# Patient Record
Sex: FEMALE | Race: WHITE | NOT HISPANIC OR LATINO | Employment: FULL TIME | URBAN - NONMETROPOLITAN AREA
[De-identification: names, ages, dates, MRNs, and addresses within clinical notes are randomized per-mention and may not be internally consistent; named-entity substitution may affect disease eponyms.]

---

## 2019-05-01 ENCOUNTER — HOSPITAL ENCOUNTER (OUTPATIENT)
Dept: FAMILY MEDICINE CLINIC | Facility: CLINIC | Age: 49
Setting detail: SPECIMEN
Discharge: HOME | End: 2019-05-01
Attending: FAMILY MEDICINE | Admitting: FAMILY MEDICINE

## 2019-05-03 ENCOUNTER — HOSPITAL ENCOUNTER (OUTPATIENT)
Dept: FAMILY MEDICINE CLINIC | Facility: CLINIC | Age: 49
Setting detail: SPECIMEN
Discharge: HOME | End: 2019-05-03
Attending: FAMILY MEDICINE | Admitting: FAMILY MEDICINE

## 2019-06-13 ENCOUNTER — HOSPITAL ENCOUNTER (OUTPATIENT)
Dept: FAMILY MEDICINE CLINIC | Facility: CLINIC | Age: 49
Setting detail: SPECIMEN
Discharge: HOME | End: 2019-06-13
Attending: FAMILY MEDICINE | Admitting: FAMILY MEDICINE

## 2019-06-13 LAB
AMPICILLIN SUSC ISLT: ABNORMAL
AZTREONAM SUSC ISLT: ABNORMAL
BACTERIA ISLT: ABNORMAL
BACTERIA SPEC AEROBE CULT: ABNORMAL
CEFAZOLIN SUSC ISLT: ABNORMAL
CEFEPIME SUSC ISLT: ABNORMAL
CEFTAZIDIME SUSC ISLT: ABNORMAL
CEFTRIAXONE SUSC ISLT: ABNORMAL
CIPROFLOXACIN SUSC ISLT: ABNORMAL
COLONY COUNT: ABNORMAL
LEVOFLOXACIN SUSC ISLT: ABNORMAL
Lab: ABNORMAL
MEROPENEM SUSC ISLT: ABNORMAL
MICRO REPORT STATUS: ABNORMAL
NITROFURANTOIN SUSC ISLT: ABNORMAL
PIP+TAZO SUSC ISLT: ABNORMAL
SPECIMEN SOURCE: ABNORMAL
SUSC METH SPEC: ABNORMAL
TETRACYCLINE SUSC ISLT: ABNORMAL
TOBRAMYCIN SUSC ISLT: ABNORMAL
TRIMETHOPRIM/SULFA: ABNORMAL

## 2019-06-17 ENCOUNTER — TRANSCRIBE ORDERS (OUTPATIENT)
Dept: CARDIOLOGY | Facility: HOSPITAL | Age: 49
End: 2019-06-17

## 2019-06-17 DIAGNOSIS — I10 ESSENTIAL HYPERTENSION: Primary | ICD-10-CM

## 2019-06-19 ENCOUNTER — HOSPITAL ENCOUNTER (OUTPATIENT)
Dept: CARDIOLOGY | Facility: HOSPITAL | Age: 49
End: 2019-06-19

## 2019-07-23 ENCOUNTER — OFFICE (AMBULATORY)
Dept: URBAN - METROPOLITAN AREA CLINIC 64 | Facility: CLINIC | Age: 49
End: 2019-07-23

## 2019-07-23 VITALS
DIASTOLIC BLOOD PRESSURE: 82 MMHG | HEART RATE: 88 BPM | WEIGHT: 177 LBS | SYSTOLIC BLOOD PRESSURE: 126 MMHG | HEIGHT: 66 IN

## 2019-07-23 DIAGNOSIS — R14.0 ABDOMINAL DISTENSION (GASEOUS): ICD-10-CM

## 2019-07-23 DIAGNOSIS — R10.9 UNSPECIFIED ABDOMINAL PAIN: ICD-10-CM

## 2019-07-23 DIAGNOSIS — R19.5 OTHER FECAL ABNORMALITIES: ICD-10-CM

## 2019-07-23 DIAGNOSIS — R19.4 CHANGE IN BOWEL HABIT: ICD-10-CM

## 2019-07-23 DIAGNOSIS — R13.10 DYSPHAGIA, UNSPECIFIED: ICD-10-CM

## 2019-07-23 DIAGNOSIS — K21.9 GASTRO-ESOPHAGEAL REFLUX DISEASE WITHOUT ESOPHAGITIS: ICD-10-CM

## 2019-07-23 PROCEDURE — 99203 OFFICE O/P NEW LOW 30 MIN: CPT | Performed by: NURSE PRACTITIONER

## 2019-07-23 RX ORDER — PANTOPRAZOLE SODIUM 40 MG/1
40 TABLET, DELAYED RELEASE ORAL
Qty: 90 | Refills: 3 | Status: ACTIVE
Start: 2019-07-23

## 2019-08-01 ENCOUNTER — ON CAMPUS - OUTPATIENT (AMBULATORY)
Dept: URBAN - METROPOLITAN AREA HOSPITAL 2 | Facility: HOSPITAL | Age: 49
End: 2019-08-01
Payer: COMMERCIAL

## 2019-08-01 ENCOUNTER — OFFICE (AMBULATORY)
Dept: URBAN - METROPOLITAN AREA PATHOLOGY 4 | Facility: PATHOLOGY | Age: 49
End: 2019-08-01
Payer: COMMERCIAL

## 2019-08-01 VITALS
SYSTOLIC BLOOD PRESSURE: 123 MMHG | RESPIRATION RATE: 17 BRPM | HEART RATE: 85 BPM | DIASTOLIC BLOOD PRESSURE: 91 MMHG | RESPIRATION RATE: 18 BRPM | RESPIRATION RATE: 16 BRPM | DIASTOLIC BLOOD PRESSURE: 83 MMHG | HEIGHT: 66 IN | SYSTOLIC BLOOD PRESSURE: 179 MMHG | HEART RATE: 98 BPM | DIASTOLIC BLOOD PRESSURE: 111 MMHG | HEART RATE: 76 BPM | SYSTOLIC BLOOD PRESSURE: 104 MMHG | OXYGEN SATURATION: 96 % | OXYGEN SATURATION: 97 % | HEART RATE: 80 BPM | HEART RATE: 90 BPM | SYSTOLIC BLOOD PRESSURE: 149 MMHG | OXYGEN SATURATION: 99 % | SYSTOLIC BLOOD PRESSURE: 128 MMHG | DIASTOLIC BLOOD PRESSURE: 78 MMHG | HEART RATE: 92 BPM | HEART RATE: 81 BPM | TEMPERATURE: 97.7 F | OXYGEN SATURATION: 98 % | DIASTOLIC BLOOD PRESSURE: 93 MMHG | HEART RATE: 104 BPM | SYSTOLIC BLOOD PRESSURE: 147 MMHG | WEIGHT: 172 LBS | SYSTOLIC BLOOD PRESSURE: 144 MMHG | DIASTOLIC BLOOD PRESSURE: 57 MMHG | SYSTOLIC BLOOD PRESSURE: 126 MMHG | OXYGEN SATURATION: 94 % | DIASTOLIC BLOOD PRESSURE: 98 MMHG | SYSTOLIC BLOOD PRESSURE: 129 MMHG | HEART RATE: 117 BPM | DIASTOLIC BLOOD PRESSURE: 79 MMHG

## 2019-08-01 DIAGNOSIS — D12.3 BENIGN NEOPLASM OF TRANSVERSE COLON: ICD-10-CM

## 2019-08-01 DIAGNOSIS — R14.0 ABDOMINAL DISTENSION (GASEOUS): ICD-10-CM

## 2019-08-01 DIAGNOSIS — K31.7 POLYP OF STOMACH AND DUODENUM: ICD-10-CM

## 2019-08-01 DIAGNOSIS — K21.9 GASTRO-ESOPHAGEAL REFLUX DISEASE WITHOUT ESOPHAGITIS: ICD-10-CM

## 2019-08-01 DIAGNOSIS — K22.2 ESOPHAGEAL OBSTRUCTION: ICD-10-CM

## 2019-08-01 DIAGNOSIS — R19.4 CHANGE IN BOWEL HABIT: ICD-10-CM

## 2019-08-01 DIAGNOSIS — R13.10 DYSPHAGIA, UNSPECIFIED: ICD-10-CM

## 2019-08-01 DIAGNOSIS — K57.30 DIVERTICULOSIS OF LARGE INTESTINE WITHOUT PERFORATION OR ABS: ICD-10-CM

## 2019-08-01 DIAGNOSIS — R10.9 UNSPECIFIED ABDOMINAL PAIN: ICD-10-CM

## 2019-08-01 DIAGNOSIS — K22.8 OTHER SPECIFIED DISEASES OF ESOPHAGUS: ICD-10-CM

## 2019-08-01 LAB
GI HISTOLOGY: A. UNSPECIFIED: (no result)
GI HISTOLOGY: B. UNSPECIFIED: (no result)
GI HISTOLOGY: C. UNSPECIFIED: (no result)
GI HISTOLOGY: PDF REPORT: (no result)

## 2019-08-01 PROCEDURE — 45380 COLONOSCOPY AND BIOPSY: CPT | Performed by: INTERNAL MEDICINE

## 2019-08-01 PROCEDURE — 43450 DILATE ESOPHAGUS 1/MULT PASS: CPT | Performed by: INTERNAL MEDICINE

## 2019-08-01 PROCEDURE — 43239 EGD BIOPSY SINGLE/MULTIPLE: CPT | Performed by: INTERNAL MEDICINE

## 2019-08-01 PROCEDURE — 88305 TISSUE EXAM BY PATHOLOGIST: CPT | Performed by: INTERNAL MEDICINE

## 2019-08-01 RX ORDER — DICYCLOMINE HYDROCHLORIDE 20 MG/1
60 TABLET ORAL
Qty: 270 | Refills: 4 | Status: ACTIVE
Start: 2019-08-01

## 2019-09-18 RX ORDER — COLESTIPOL HYDROCHLORIDE 5 G/5G
5 GRANULE, FOR SUSPENSION ORAL 3 TIMES DAILY
Qty: 450 G | Refills: 3 | Status: SHIPPED | OUTPATIENT
Start: 2019-09-18 | End: 2020-01-23

## 2019-09-18 RX ORDER — LISINOPRIL 2.5 MG/1
2.5 TABLET ORAL DAILY
Qty: 30 TABLET | Refills: 3 | Status: SHIPPED | OUTPATIENT
Start: 2019-09-18 | End: 2020-01-07

## 2019-09-18 RX ORDER — LISINOPRIL 2.5 MG/1
1 TABLET ORAL DAILY
COMMUNITY
Start: 2018-09-26 | End: 2019-09-18 | Stop reason: SDUPTHER

## 2019-11-08 DIAGNOSIS — H81.10 BENIGN PAROXYSMAL POSITIONAL VERTIGO, UNSPECIFIED LATERALITY: Primary | ICD-10-CM

## 2019-11-08 RX ORDER — MECLIZINE HYDROCHLORIDE 25 MG/1
25 TABLET ORAL 3 TIMES DAILY PRN
Qty: 45 TABLET | Refills: 1 | Status: SHIPPED | OUTPATIENT
Start: 2019-11-08 | End: 2020-04-21

## 2019-11-13 DIAGNOSIS — H81.10 BPV (BENIGN POSITIONAL VERTIGO), UNSPECIFIED LATERALITY: Primary | ICD-10-CM

## 2019-11-15 ENCOUNTER — HOSPITAL ENCOUNTER (EMERGENCY)
Facility: HOSPITAL | Age: 49
Discharge: HOME | End: 2019-11-15
Attending: EMERGENCY MEDICINE | Admitting: EMERGENCY MEDICINE

## 2019-11-15 ENCOUNTER — APPOINTMENT (OUTPATIENT)
Dept: MRI IMAGING | Facility: HOSPITAL | Age: 49
End: 2019-11-15

## 2019-11-15 VITALS
DIASTOLIC BLOOD PRESSURE: 76 MMHG | BODY MASS INDEX: 28.7 KG/M2 | RESPIRATION RATE: 18 BRPM | OXYGEN SATURATION: 98 % | HEART RATE: 100 BPM | HEIGHT: 66 IN | SYSTOLIC BLOOD PRESSURE: 128 MMHG | WEIGHT: 178.57 LBS | TEMPERATURE: 98 F

## 2019-11-15 DIAGNOSIS — H81.393 PERIPHERAL VERTIGO OF BOTH EARS: Primary | ICD-10-CM

## 2019-11-15 DIAGNOSIS — R51.9 NONINTRACTABLE HEADACHE, UNSPECIFIED CHRONICITY PATTERN, UNSPECIFIED HEADACHE TYPE: ICD-10-CM

## 2019-11-15 LAB
ANION GAP SERPL CALCULATED.3IONS-SCNC: 14 MMOL/L (ref 5–15)
BASOPHILS # BLD AUTO: 0.1 10*3/MM3 (ref 0–0.2)
BASOPHILS NFR BLD AUTO: 0.9 % (ref 0–1.5)
BUN BLD-MCNC: 12 MG/DL (ref 6–20)
BUN/CREAT SERPL: 13.8 (ref 7–25)
CALCIUM SPEC-SCNC: 10.1 MG/DL (ref 8.6–10.5)
CHLORIDE SERPL-SCNC: 98 MMOL/L (ref 98–107)
CO2 SERPL-SCNC: 25 MMOL/L (ref 22–29)
CREAT BLD-MCNC: 0.87 MG/DL (ref 0.57–1)
DEPRECATED RDW RBC AUTO: 42.9 FL (ref 37–54)
EOSINOPHIL # BLD AUTO: 0.2 10*3/MM3 (ref 0–0.4)
EOSINOPHIL NFR BLD AUTO: 2.1 % (ref 0.3–6.2)
ERYTHROCYTE [DISTWIDTH] IN BLOOD BY AUTOMATED COUNT: 13.7 % (ref 12.3–15.4)
GFR SERPL CREATININE-BSD FRML MDRD: 69 ML/MIN/1.73
GLUCOSE BLD-MCNC: 105 MG/DL (ref 65–99)
GLUCOSE BLDC GLUCOMTR-MCNC: 91 MG/DL (ref 70–105)
HCT VFR BLD AUTO: 44.1 % (ref 34–46.6)
HGB BLD-MCNC: 15.5 G/DL (ref 12–15.9)
LYMPHOCYTES # BLD AUTO: 2.8 10*3/MM3 (ref 0.7–3.1)
LYMPHOCYTES NFR BLD AUTO: 33.4 % (ref 19.6–45.3)
MCH RBC QN AUTO: 31.4 PG (ref 26.6–33)
MCHC RBC AUTO-ENTMCNC: 35.1 G/DL (ref 31.5–35.7)
MCV RBC AUTO: 89.4 FL (ref 79–97)
MONOCYTES # BLD AUTO: 0.6 10*3/MM3 (ref 0.1–0.9)
MONOCYTES NFR BLD AUTO: 6.7 % (ref 5–12)
NEUTROPHILS # BLD AUTO: 4.8 10*3/MM3 (ref 1.7–7)
NEUTROPHILS NFR BLD AUTO: 56.9 % (ref 42.7–76)
NRBC BLD AUTO-RTO: 0.1 /100 WBC (ref 0–0.2)
PLATELET # BLD AUTO: 381 10*3/MM3 (ref 140–450)
PMV BLD AUTO: 7.4 FL (ref 6–12)
POTASSIUM BLD-SCNC: 3.8 MMOL/L (ref 3.5–5.2)
RBC # BLD AUTO: 4.93 10*6/MM3 (ref 3.77–5.28)
SODIUM BLD-SCNC: 137 MMOL/L (ref 136–145)
WBC NRBC COR # BLD: 8.5 10*3/MM3 (ref 3.4–10.8)

## 2019-11-15 PROCEDURE — 99283 EMERGENCY DEPT VISIT LOW MDM: CPT

## 2019-11-15 PROCEDURE — 25010000002 LORAZEPAM PER 2 MG

## 2019-11-15 PROCEDURE — 80048 BASIC METABOLIC PNL TOTAL CA: CPT | Performed by: EMERGENCY MEDICINE

## 2019-11-15 PROCEDURE — 82962 GLUCOSE BLOOD TEST: CPT

## 2019-11-15 PROCEDURE — 70551 MRI BRAIN STEM W/O DYE: CPT

## 2019-11-15 PROCEDURE — 93005 ELECTROCARDIOGRAM TRACING: CPT | Performed by: EMERGENCY MEDICINE

## 2019-11-15 PROCEDURE — 85025 COMPLETE CBC W/AUTO DIFF WBC: CPT | Performed by: EMERGENCY MEDICINE

## 2019-11-15 PROCEDURE — 96374 THER/PROPH/DIAG INJ IV PUSH: CPT

## 2019-11-15 RX ORDER — SODIUM CHLORIDE 0.9 % (FLUSH) 0.9 %
10 SYRINGE (ML) INJECTION AS NEEDED
Status: DISCONTINUED | OUTPATIENT
Start: 2019-11-15 | End: 2019-11-15 | Stop reason: HOSPADM

## 2019-11-15 RX ORDER — LORAZEPAM 2 MG/ML
INJECTION INTRAMUSCULAR
Status: COMPLETED
Start: 2019-11-15 | End: 2019-11-15

## 2019-11-15 RX ORDER — MECLIZINE HYDROCHLORIDE 25 MG/1
25 TABLET ORAL ONCE
Status: COMPLETED | OUTPATIENT
Start: 2019-11-15 | End: 2019-11-15

## 2019-11-15 RX ORDER — LORAZEPAM 2 MG/ML
1 INJECTION INTRAMUSCULAR ONCE
Status: COMPLETED | OUTPATIENT
Start: 2019-11-15 | End: 2019-11-15

## 2019-11-15 RX ADMIN — MECLIZINE HYDROCHLORIDE 25 MG: 25 TABLET ORAL at 10:48

## 2019-11-15 RX ADMIN — SODIUM CHLORIDE 500 ML: 900 INJECTION, SOLUTION INTRAVENOUS at 10:48

## 2019-11-15 RX ADMIN — LORAZEPAM 1 MG: 2 INJECTION INTRAMUSCULAR at 10:54

## 2019-11-15 NOTE — ED PROVIDER NOTES
Subjective   History of Present Illness  Dizziness  49-year-old female has been battling vertigo off and on over the last 3 weeks.  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  It was associate with headache and blurry vision.  States she has had some ringing in her right ear since the onset 3 weeks ago.  She reports no head injury or focal numbness or weakness or speech difficulty  Review of Systems   Constitutional: Negative.    HENT: Positive for tinnitus.    Eyes: Negative.    Respiratory: Negative.    Cardiovascular: Negative.    Gastrointestinal: Negative.    Genitourinary: Negative.    Musculoskeletal: Negative.    Skin: Negative.    Neurological: Positive for dizziness and headaches.   Psychiatric/Behavioral: Negative.        Past Medical History:   Diagnosis Date   • Hyperlipidemia    • Hypertension      Hyperlipidemia, hypertension  Allergies   Allergen Reactions   • Doxycycline Other (See Comments) and Rash     Flushing     • Dye Fdc Red [Red Dye] Anaphylaxis   • Levofloxacin Other (See Comments) and Rash     Flushing     • Ciprofloxacin Other (See Comments)     Flushing        Past Surgical History:   Procedure Laterality Date   • CHOLECYSTECTOMY     • COLONOSCOPY     • DILATATION AND CURETTAGE     • ECTOPIC PREGNANCY SURGERY         History reviewed. No pertinent family history.    Social History     Socioeconomic History   • Marital status:      Spouse name: Not on file   • Number of children: Not on file   • Years of education: Not on file   • Highest education level: Not on file   Tobacco Use   • Smoking status: Never Smoker   Substance and Sexual Activity   • Alcohol use: No     Frequency: Never   • Drug use: No   • Sexual activity: Defer           Objective   Physical Exam  BP (!) 181/108   Pulse 101   Temp 98 °F (36.7 °C)   Resp  "20   Ht 167.6 cm (66\")   Wt 81 kg (178 lb 9.2 oz)   SpO2 100%   BMI 28.82 kg/m²   General: Well-developed well-appearing, no acute distress, alert and appropriate  Eyes: Pupils round and reactive, sclera nonicteric  HEENT: Mucous membranes moist, no mucosal swelling, tympanic membranes clear bilaterally  Neck: Supple, no nuchal rigidity, no lymphadenopathy  Respirations: Respirations nonlabored, equal breath sounds bilaterally, clear lungs  Heart regular rate and rhythm, no murmurs rubs or gallops,   Abdomen soft nontender nondistended, no hepatosplenomegaly, no hernia, no mass, normal bowel sounds, no CVA tenderness  Extremities no clubbing cyanosis or edema, calves are symmetric and nontender  Neuro cranial nerves II through XII intact , normal sensory/motor function and strength in four extremities, no slurred speech, no facial droop, normal finger to nose, normal heel to shin, no nuchal rigidity  Psych oriented, pleasant affect  Skin no rash, brisk cap refill  Procedures           ED Course     EKG shows sinus rhythm, rate of 109,    Results for orders placed or performed during the hospital encounter of 11/15/19   Basic Metabolic Panel   Result Value Ref Range    Glucose 105 (H) 65 - 99 mg/dL    BUN 12 6 - 20 mg/dL    Creatinine 0.87 0.57 - 1.00 mg/dL    Sodium 137 136 - 145 mmol/L    Potassium 3.8 3.5 - 5.2 mmol/L    Chloride 98 98 - 107 mmol/L    CO2 25.0 22.0 - 29.0 mmol/L    Calcium 10.1 8.6 - 10.5 mg/dL    eGFR Non African Amer 69 >60 mL/min/1.73    BUN/Creatinine Ratio 13.8 7.0 - 25.0    Anion Gap 14.0 5.0 - 15.0 mmol/L   CBC Auto Differential   Result Value Ref Range    WBC 8.50 3.40 - 10.80 10*3/mm3    RBC 4.93 3.77 - 5.28 10*6/mm3    Hemoglobin 15.5 12.0 - 15.9 g/dL    Hematocrit 44.1 34.0 - 46.6 %    MCV 89.4 79.0 - 97.0 fL    MCH 31.4 26.6 - 33.0 pg    MCHC 35.1 31.5 - 35.7 g/dL    RDW 13.7 12.3 - 15.4 %    RDW-SD 42.9 37.0 - 54.0 fl    MPV 7.4 6.0 - 12.0 fL    Platelets 381 140 - 450 10*3/mm3    " Neutrophil % 56.9 42.7 - 76.0 %    Lymphocyte % 33.4 19.6 - 45.3 %    Monocyte % 6.7 5.0 - 12.0 %    Eosinophil % 2.1 0.3 - 6.2 %    Basophil % 0.9 0.0 - 1.5 %    Neutrophils, Absolute 4.80 1.70 - 7.00 10*3/mm3    Lymphocytes, Absolute 2.80 0.70 - 3.10 10*3/mm3    Monocytes, Absolute 0.60 0.10 - 0.90 10*3/mm3    Eosinophils, Absolute 0.20 0.00 - 0.40 10*3/mm3    Basophils, Absolute 0.10 0.00 - 0.20 10*3/mm3    nRBC 0.1 0.0 - 0.2 /100 WBC   POC Glucose Once   Result Value Ref Range    Glucose 91 70 - 105 mg/dL     Mri Brain Without Contrast    Result Date: 11/15/2019  No acute intracranial abnormality. No acute infarct. No intracranial mass/mass effect.  Electronically Signed By-Michael Tee On:11/15/2019 11:45 AM This report was finalized on [id number] by  Michael Tee, .              MDM  Patient has a normal neurologic exam.  The MRI was negative.  She does have symptoms of peripheral vertigo.  It was not felt to be related to acute stroke.  She did get improvement with meclizine during the emergency room course.  She is discharged in good condition to follow-up with her doctor and was given warning signs for return.  Final diagnoses:   Peripheral vertigo of both ears   Nonintractable headache, unspecified chronicity pattern, unspecified headache type              Bright Johnson MD  11/15/19 9251

## 2019-11-15 NOTE — DISCHARGE INSTRUCTIONS
Return for increased pain fever vomiting, shortness of air, increased dizziness, numbness, weakness or any other concerns.  Meclizine for vertigo

## 2019-11-18 DIAGNOSIS — H81.10 BENIGN PAROXYSMAL POSITIONAL VERTIGO, UNSPECIFIED LATERALITY: Primary | ICD-10-CM

## 2019-12-10 RX ORDER — BUSPIRONE HYDROCHLORIDE 5 MG/1
5 TABLET ORAL 3 TIMES DAILY PRN
Qty: 60 TABLET | Refills: 1 | Status: SHIPPED | OUTPATIENT
Start: 2019-12-10 | End: 2020-04-21

## 2020-01-02 ENCOUNTER — CLINICAL SUPPORT (OUTPATIENT)
Dept: FAMILY MEDICINE CLINIC | Facility: CLINIC | Age: 50
End: 2020-01-02

## 2020-01-02 DIAGNOSIS — R31.9 HEMATURIA, UNSPECIFIED TYPE: Primary | ICD-10-CM

## 2020-01-02 DIAGNOSIS — R30.0 DYSURIA: ICD-10-CM

## 2020-01-02 LAB
BILIRUB BLD-MCNC: NEGATIVE MG/DL
CLARITY, POC: CLEAR
COLOR UR: YELLOW
GLUCOSE UR STRIP-MCNC: NEGATIVE MG/DL
KETONES UR QL: NEGATIVE
LEUKOCYTE EST, POC: NEGATIVE
NITRITE UR-MCNC: POSITIVE MG/ML
PH UR: 5.5 [PH] (ref 5–8)
PROT UR STRIP-MCNC: NEGATIVE MG/DL
RBC # UR STRIP: ABNORMAL /UL
SP GR UR: 1.02 (ref 1–1.03)
UROBILINOGEN UR QL: NORMAL

## 2020-01-02 PROCEDURE — 87186 SC STD MICRODIL/AGAR DIL: CPT | Performed by: NURSE PRACTITIONER

## 2020-01-02 PROCEDURE — 87086 URINE CULTURE/COLONY COUNT: CPT | Performed by: NURSE PRACTITIONER

## 2020-01-02 PROCEDURE — 81003 URINALYSIS AUTO W/O SCOPE: CPT | Performed by: NURSE PRACTITIONER

## 2020-01-04 LAB — BACTERIA SPEC AEROBE CULT: ABNORMAL

## 2020-01-07 RX ORDER — LISINOPRIL 2.5 MG/1
TABLET ORAL
Qty: 90 TABLET | Refills: 1 | Status: SHIPPED | OUTPATIENT
Start: 2020-01-07 | End: 2020-01-23 | Stop reason: SDUPTHER

## 2020-01-08 RX ORDER — CEPHALEXIN 500 MG/1
500 CAPSULE ORAL 2 TIMES DAILY
Qty: 6 CAPSULE | Refills: 0 | Status: SHIPPED | OUTPATIENT
Start: 2020-01-08 | End: 2020-02-04 | Stop reason: SDUPTHER

## 2020-01-13 RX ORDER — CHOLESTYRAMINE 4 G/5.5G
4 POWDER, FOR SUSPENSION ORAL DAILY
Qty: 210 G | Refills: 2 | Status: SHIPPED | OUTPATIENT
Start: 2020-01-13 | End: 2020-01-23 | Stop reason: SDUPTHER

## 2020-01-13 RX ORDER — CHOLESTYRAMINE 4 G/5.5G
3 POWDER, FOR SUSPENSION ORAL DAILY
Refills: 0 | COMMUNITY
Start: 2019-10-09 | End: 2020-01-13 | Stop reason: SDUPTHER

## 2020-01-23 RX ORDER — CHOLESTYRAMINE 4 G/5.5G
4 POWDER, FOR SUSPENSION ORAL DAILY
Qty: 360 G | Refills: 1 | Status: SHIPPED | OUTPATIENT
Start: 2020-01-23 | End: 2020-02-13

## 2020-01-23 RX ORDER — LISINOPRIL 2.5 MG/1
2.5 TABLET ORAL DAILY
Qty: 90 TABLET | Refills: 1 | Status: SHIPPED | OUTPATIENT
Start: 2020-01-23 | End: 2020-04-21 | Stop reason: HOSPADM

## 2020-02-04 RX ORDER — FLUCONAZOLE 150 MG/1
150 TABLET ORAL ONCE
Qty: 2 TABLET | Refills: 1 | Status: SHIPPED | OUTPATIENT
Start: 2020-02-04 | End: 2020-02-04

## 2020-02-04 RX ORDER — CEPHALEXIN 500 MG/1
500 CAPSULE ORAL 2 TIMES DAILY
Qty: 14 CAPSULE | Refills: 0 | Status: SHIPPED | OUTPATIENT
Start: 2020-02-04 | End: 2020-02-11

## 2020-02-13 RX ORDER — CHOLESTYRAMINE 4 G/5.5G
4 POWDER, FOR SUSPENSION ORAL DAILY
Qty: 231 G | Refills: 2 | Status: SHIPPED | OUTPATIENT
Start: 2020-02-13 | End: 2020-07-27 | Stop reason: SDUPTHER

## 2020-03-24 RX ORDER — VALACYCLOVIR HYDROCHLORIDE 500 MG/1
500 TABLET, FILM COATED ORAL 2 TIMES DAILY
Qty: 14 TABLET | Refills: 1 | Status: SHIPPED | OUTPATIENT
Start: 2020-03-24 | End: 2020-04-21

## 2020-04-16 RX ORDER — NEOMYCIN SULFATE, POLYMYXIN B SULFATE, HYDROCORTISONE 3.5; 10000; 1 MG/ML; [USP'U]/ML; MG/ML
3 SOLUTION/ DROPS AURICULAR (OTIC) 4 TIMES DAILY
Qty: 1 BOTTLE | Refills: 0 | Status: SHIPPED | OUTPATIENT
Start: 2020-04-16 | End: 2020-04-21

## 2020-04-17 RX ORDER — CHOLESTYRAMINE 4 G/5.5G
4 POWDER, FOR SUSPENSION ORAL DAILY
Qty: 231 G | Refills: 2 | Status: CANCELLED | OUTPATIENT
Start: 2020-04-17

## 2020-04-21 ENCOUNTER — HOSPITAL ENCOUNTER (OUTPATIENT)
Facility: HOSPITAL | Age: 50
Setting detail: OBSERVATION
Discharge: HOME | End: 2020-04-21
Attending: EMERGENCY MEDICINE | Admitting: INTERNAL MEDICINE

## 2020-04-21 ENCOUNTER — APPOINTMENT (OUTPATIENT)
Dept: CT IMAGING | Facility: HOSPITAL | Age: 50
End: 2020-04-21

## 2020-04-21 ENCOUNTER — APPOINTMENT (OUTPATIENT)
Dept: GENERAL RADIOLOGY | Facility: HOSPITAL | Age: 50
End: 2020-04-21

## 2020-04-21 ENCOUNTER — READMISSION MANAGEMENT (OUTPATIENT)
Dept: CALL CENTER | Facility: HOSPITAL | Age: 50
End: 2020-04-21

## 2020-04-21 VITALS
WEIGHT: 180.12 LBS | HEART RATE: 98 BPM | OXYGEN SATURATION: 94 % | DIASTOLIC BLOOD PRESSURE: 82 MMHG | BODY MASS INDEX: 28.95 KG/M2 | RESPIRATION RATE: 16 BRPM | TEMPERATURE: 97.9 F | SYSTOLIC BLOOD PRESSURE: 147 MMHG | HEIGHT: 66 IN

## 2020-04-21 DIAGNOSIS — R05.9 COUGH: ICD-10-CM

## 2020-04-21 DIAGNOSIS — I10 ESSENTIAL HYPERTENSION: ICD-10-CM

## 2020-04-21 DIAGNOSIS — R07.9 CHEST PAIN IN ADULT: Primary | ICD-10-CM

## 2020-04-21 PROBLEM — E78.5 HYPERLIPIDEMIA: Chronic | Status: ACTIVE | Noted: 2020-04-21

## 2020-04-21 PROBLEM — F41.1 GENERALIZED ANXIETY DISORDER: Status: ACTIVE | Noted: 2020-04-21

## 2020-04-21 PROBLEM — R00.0 TACHYCARDIA: Status: ACTIVE | Noted: 2020-04-21

## 2020-04-21 PROBLEM — F41.1 GENERALIZED ANXIETY DISORDER: Status: RESOLVED | Noted: 2020-04-21 | Resolved: 2020-04-21

## 2020-04-21 PROBLEM — I16.0 HYPERTENSIVE URGENCY: Status: RESOLVED | Noted: 2020-04-21 | Resolved: 2020-04-21

## 2020-04-21 PROBLEM — R00.0 TACHYCARDIA: Status: RESOLVED | Noted: 2020-04-21 | Resolved: 2020-04-21

## 2020-04-21 PROBLEM — E66.3 OVERWEIGHT (BMI 25.0-29.9): Chronic | Status: ACTIVE | Noted: 2020-04-21

## 2020-04-21 PROBLEM — R79.89 ELEVATED LFTS: Status: ACTIVE | Noted: 2020-04-21

## 2020-04-21 PROBLEM — E78.5 HYPERLIPIDEMIA: Status: ACTIVE | Noted: 2020-04-21

## 2020-04-21 PROBLEM — E78.2 MIXED HYPERLIPIDEMIA: Chronic | Status: ACTIVE | Noted: 2020-04-21

## 2020-04-21 PROBLEM — I16.0 HYPERTENSIVE URGENCY: Status: ACTIVE | Noted: 2020-04-21

## 2020-04-21 LAB
ALBUMIN SERPL-MCNC: 4.2 G/DL (ref 3.5–5.2)
ALBUMIN SERPL-MCNC: 4.7 G/DL (ref 3.5–5.2)
ALBUMIN/GLOB SERPL: 1.2 G/DL
ALBUMIN/GLOB SERPL: 1.5 G/DL
ALP SERPL-CCNC: 79 U/L (ref 39–117)
ALP SERPL-CCNC: 85 U/L (ref 39–117)
ALT SERPL W P-5'-P-CCNC: 105 U/L (ref 1–33)
ALT SERPL W P-5'-P-CCNC: 114 U/L (ref 1–33)
ANION GAP SERPL CALCULATED.3IONS-SCNC: 15 MMOL/L (ref 5–15)
ANION GAP SERPL CALCULATED.3IONS-SCNC: 16 MMOL/L (ref 5–15)
AST SERPL-CCNC: 65 U/L (ref 1–32)
AST SERPL-CCNC: 81 U/L (ref 1–32)
B PERT DNA SPEC QL NAA+PROBE: NOT DETECTED
BASOPHILS # BLD AUTO: 0.1 10*3/MM3 (ref 0–0.2)
BASOPHILS # BLD AUTO: 0.1 10*3/MM3 (ref 0–0.2)
BASOPHILS NFR BLD AUTO: 0.9 % (ref 0–1.5)
BASOPHILS NFR BLD AUTO: 1.2 % (ref 0–1.5)
BILIRUB SERPL-MCNC: 0.2 MG/DL (ref 0.2–1.2)
BILIRUB SERPL-MCNC: <0.2 MG/DL (ref 0.2–1.2)
BUN BLD-MCNC: 11 MG/DL (ref 6–20)
BUN BLD-MCNC: 12 MG/DL (ref 6–20)
BUN/CREAT SERPL: 11.3 (ref 7–25)
BUN/CREAT SERPL: 12.1 (ref 7–25)
C PNEUM DNA NPH QL NAA+NON-PROBE: NOT DETECTED
CALCIUM SPEC-SCNC: 9.7 MG/DL (ref 8.6–10.5)
CALCIUM SPEC-SCNC: 9.8 MG/DL (ref 8.6–10.5)
CHLORIDE SERPL-SCNC: 100 MMOL/L (ref 98–107)
CHLORIDE SERPL-SCNC: 101 MMOL/L (ref 98–107)
CHOLEST SERPL-MCNC: 258 MG/DL (ref 0–200)
CO2 SERPL-SCNC: 23 MMOL/L (ref 22–29)
CO2 SERPL-SCNC: 24 MMOL/L (ref 22–29)
CREAT BLD-MCNC: 0.97 MG/DL (ref 0.57–1)
CREAT BLD-MCNC: 0.99 MG/DL (ref 0.57–1)
CRP SERPL-MCNC: 0.29 MG/DL (ref 0–0.5)
D DIMER PPP FEU-MCNC: 0.5 MCGFEU/ML (ref 0.17–0.59)
DACRYOCYTES BLD QL SMEAR: NORMAL
DEPRECATED RDW RBC AUTO: 41.1 FL (ref 37–54)
DEPRECATED RDW RBC AUTO: 43.3 FL (ref 37–54)
EOSINOPHIL # BLD AUTO: 0 10*3/MM3 (ref 0–0.4)
EOSINOPHIL # BLD AUTO: 0.1 10*3/MM3 (ref 0–0.4)
EOSINOPHIL NFR BLD AUTO: 0.6 % (ref 0.3–6.2)
EOSINOPHIL NFR BLD AUTO: 1.6 % (ref 0.3–6.2)
ERYTHROCYTE [DISTWIDTH] IN BLOOD BY AUTOMATED COUNT: 13.2 % (ref 12.3–15.4)
ERYTHROCYTE [DISTWIDTH] IN BLOOD BY AUTOMATED COUNT: 13.6 % (ref 12.3–15.4)
FERRITIN SERPL-MCNC: 99.1 NG/ML (ref 13–150)
FLUAV H1 2009 PAND RNA NPH QL NAA+PROBE: NOT DETECTED
FLUAV H1 HA GENE NPH QL NAA+PROBE: NOT DETECTED
FLUAV H3 RNA NPH QL NAA+PROBE: NOT DETECTED
FLUAV SUBTYP SPEC NAA+PROBE: NOT DETECTED
FLUBV RNA ISLT QL NAA+PROBE: NOT DETECTED
GFR SERPL CREATININE-BSD FRML MDRD: 60 ML/MIN/1.73
GFR SERPL CREATININE-BSD FRML MDRD: 61 ML/MIN/1.73
GLOBULIN UR ELPH-MCNC: 3.2 GM/DL
GLOBULIN UR ELPH-MCNC: 3.5 GM/DL
GLUCOSE BLD-MCNC: 113 MG/DL (ref 65–99)
GLUCOSE BLD-MCNC: 118 MG/DL (ref 65–99)
HADV DNA SPEC NAA+PROBE: NOT DETECTED
HCOV 229E RNA SPEC QL NAA+PROBE: NOT DETECTED
HCOV HKU1 RNA SPEC QL NAA+PROBE: NOT DETECTED
HCOV NL63 RNA SPEC QL NAA+PROBE: NOT DETECTED
HCOV OC43 RNA SPEC QL NAA+PROBE: NOT DETECTED
HCT VFR BLD AUTO: 40.3 % (ref 34–46.6)
HCT VFR BLD AUTO: 42.8 % (ref 34–46.6)
HDLC SERPL-MCNC: 56 MG/DL (ref 40–60)
HGB BLD-MCNC: 14.7 G/DL (ref 12–15.9)
HGB BLD-MCNC: 15.5 G/DL (ref 12–15.9)
HMPV RNA NPH QL NAA+NON-PROBE: NOT DETECTED
HOLD SPECIMEN: NORMAL
HOLD SPECIMEN: NORMAL
HPIV1 RNA SPEC QL NAA+PROBE: NOT DETECTED
HPIV2 RNA SPEC QL NAA+PROBE: NOT DETECTED
HPIV3 RNA NPH QL NAA+PROBE: NOT DETECTED
HPIV4 P GENE NPH QL NAA+PROBE: NOT DETECTED
LARGE PLATELETS: NORMAL
LDH SERPL-CCNC: 261 U/L (ref 135–214)
LDLC SERPL CALC-MCNC: 122 MG/DL (ref 0–100)
LDLC/HDLC SERPL: 2.18 {RATIO}
LIPASE SERPL-CCNC: 39 U/L (ref 13–60)
LYMPHOCYTES # BLD AUTO: 2.4 10*3/MM3 (ref 0.7–3.1)
LYMPHOCYTES # BLD AUTO: 3.3 10*3/MM3 (ref 0.7–3.1)
LYMPHOCYTES NFR BLD AUTO: 28.9 % (ref 19.6–45.3)
LYMPHOCYTES NFR BLD AUTO: 35.7 % (ref 19.6–45.3)
M PNEUMO IGG SER IA-ACNC: NOT DETECTED
MCH RBC QN AUTO: 32.7 PG (ref 26.6–33)
MCH RBC QN AUTO: 32.8 PG (ref 26.6–33)
MCHC RBC AUTO-ENTMCNC: 36.2 G/DL (ref 31.5–35.7)
MCHC RBC AUTO-ENTMCNC: 36.5 G/DL (ref 31.5–35.7)
MCV RBC AUTO: 89.6 FL (ref 79–97)
MCV RBC AUTO: 90.5 FL (ref 79–97)
MONOCYTES # BLD AUTO: 0.4 10*3/MM3 (ref 0.1–0.9)
MONOCYTES # BLD AUTO: 0.8 10*3/MM3 (ref 0.1–0.9)
MONOCYTES NFR BLD AUTO: 5.4 % (ref 5–12)
MONOCYTES NFR BLD AUTO: 9.1 % (ref 5–12)
NEUTROPHILS # BLD AUTO: 4.8 10*3/MM3 (ref 1.7–7)
NEUTROPHILS # BLD AUTO: 5.3 10*3/MM3 (ref 1.7–7)
NEUTROPHILS NFR BLD AUTO: 52.7 % (ref 42.7–76)
NEUTROPHILS NFR BLD AUTO: 63.9 % (ref 42.7–76)
NRBC BLD AUTO-RTO: 0 /100 WBC (ref 0–0.2)
NRBC BLD AUTO-RTO: 0.1 /100 WBC (ref 0–0.2)
NT-PROBNP SERPL-MCNC: <5 PG/ML (ref 5–450)
PLATELET # BLD AUTO: 294 10*3/MM3 (ref 140–450)
PLATELET # BLD AUTO: 343 10*3/MM3 (ref 140–450)
PMV BLD AUTO: 7.7 FL (ref 6–12)
PMV BLD AUTO: 8 FL (ref 6–12)
POIKILOCYTOSIS BLD QL SMEAR: NORMAL
POTASSIUM BLD-SCNC: 3.9 MMOL/L (ref 3.5–5.2)
POTASSIUM BLD-SCNC: 4.1 MMOL/L (ref 3.5–5.2)
PROCALCITONIN SERPL-MCNC: 0.07 NG/ML (ref 0.1–0.25)
PROT SERPL-MCNC: 7.7 G/DL (ref 6–8.5)
PROT SERPL-MCNC: 7.9 G/DL (ref 6–8.5)
RBC # BLD AUTO: 4.5 10*6/MM3 (ref 3.77–5.28)
RBC # BLD AUTO: 4.73 10*6/MM3 (ref 3.77–5.28)
RHINOVIRUS RNA SPEC NAA+PROBE: NOT DETECTED
RSV RNA NPH QL NAA+NON-PROBE: NOT DETECTED
SARS-COV-2 RNA RESP QL NAA+PROBE: NOT DETECTED
SMALL PLATELETS BLD QL SMEAR: ADEQUATE
SODIUM BLD-SCNC: 139 MMOL/L (ref 136–145)
SODIUM BLD-SCNC: 140 MMOL/L (ref 136–145)
TRIGL SERPL-MCNC: 400 MG/DL (ref 0–150)
TROPONIN T SERPL-MCNC: <0.01 NG/ML (ref 0–0.03)
TSH SERPL DL<=0.05 MIU/L-ACNC: 3.9 UIU/ML (ref 0.27–4.2)
VLDLC SERPL-MCNC: 80 MG/DL
WBC MORPH BLD: NORMAL
WBC NRBC COR # BLD: 8.3 10*3/MM3 (ref 3.4–10.8)
WBC NRBC COR # BLD: 9.1 10*3/MM3 (ref 3.4–10.8)
WHOLE BLOOD HOLD SPECIMEN: NORMAL
WHOLE BLOOD HOLD SPECIMEN: NORMAL

## 2020-04-21 PROCEDURE — 99235 HOSP IP/OBS SAME DATE MOD 70: CPT | Performed by: INTERNAL MEDICINE

## 2020-04-21 PROCEDURE — 96361 HYDRATE IV INFUSION ADD-ON: CPT

## 2020-04-21 PROCEDURE — 96372 THER/PROPH/DIAG INJ SC/IM: CPT

## 2020-04-21 PROCEDURE — 96374 THER/PROPH/DIAG INJ IV PUSH: CPT

## 2020-04-21 PROCEDURE — 82728 ASSAY OF FERRITIN: CPT | Performed by: NURSE PRACTITIONER

## 2020-04-21 PROCEDURE — 84484 ASSAY OF TROPONIN QUANT: CPT | Performed by: NURSE PRACTITIONER

## 2020-04-21 PROCEDURE — G0378 HOSPITAL OBSERVATION PER HR: HCPCS

## 2020-04-21 PROCEDURE — 25010000002 ONDANSETRON PER 1 MG

## 2020-04-21 PROCEDURE — 80061 LIPID PANEL: CPT | Performed by: NURSE PRACTITIONER

## 2020-04-21 PROCEDURE — 83690 ASSAY OF LIPASE: CPT | Performed by: EMERGENCY MEDICINE

## 2020-04-21 PROCEDURE — 80053 COMPREHEN METABOLIC PANEL: CPT | Performed by: NURSE PRACTITIONER

## 2020-04-21 PROCEDURE — 70486 CT MAXILLOFACIAL W/O DYE: CPT

## 2020-04-21 PROCEDURE — 85379 FIBRIN DEGRADATION QUANT: CPT | Performed by: EMERGENCY MEDICINE

## 2020-04-21 PROCEDURE — 84443 ASSAY THYROID STIM HORMONE: CPT | Performed by: NURSE PRACTITIONER

## 2020-04-21 PROCEDURE — 0099U HC BIOFIRE FILMARRAY RESP PANEL 1: CPT | Performed by: EMERGENCY MEDICINE

## 2020-04-21 PROCEDURE — 86140 C-REACTIVE PROTEIN: CPT | Performed by: NURSE PRACTITIONER

## 2020-04-21 PROCEDURE — 93005 ELECTROCARDIOGRAM TRACING: CPT | Performed by: EMERGENCY MEDICINE

## 2020-04-21 PROCEDURE — 99285 EMERGENCY DEPT VISIT HI MDM: CPT

## 2020-04-21 PROCEDURE — 85025 COMPLETE CBC W/AUTO DIFF WBC: CPT | Performed by: EMERGENCY MEDICINE

## 2020-04-21 PROCEDURE — 85007 BL SMEAR W/DIFF WBC COUNT: CPT | Performed by: EMERGENCY MEDICINE

## 2020-04-21 PROCEDURE — 84484 ASSAY OF TROPONIN QUANT: CPT | Performed by: EMERGENCY MEDICINE

## 2020-04-21 PROCEDURE — 83880 ASSAY OF NATRIURETIC PEPTIDE: CPT | Performed by: EMERGENCY MEDICINE

## 2020-04-21 PROCEDURE — 85025 COMPLETE CBC W/AUTO DIFF WBC: CPT | Performed by: NURSE PRACTITIONER

## 2020-04-21 PROCEDURE — 83615 LACTATE (LD) (LDH) ENZYME: CPT | Performed by: NURSE PRACTITIONER

## 2020-04-21 PROCEDURE — 25010000002 ENOXAPARIN PER 10 MG: Performed by: NURSE PRACTITIONER

## 2020-04-21 PROCEDURE — 87635 SARS-COV-2 COVID-19 AMP PRB: CPT | Performed by: EMERGENCY MEDICINE

## 2020-04-21 PROCEDURE — 96375 TX/PRO/DX INJ NEW DRUG ADDON: CPT

## 2020-04-21 PROCEDURE — 80053 COMPREHEN METABOLIC PANEL: CPT | Performed by: EMERGENCY MEDICINE

## 2020-04-21 PROCEDURE — 71045 X-RAY EXAM CHEST 1 VIEW: CPT

## 2020-04-21 PROCEDURE — 84145 PROCALCITONIN (PCT): CPT | Performed by: NURSE PRACTITIONER

## 2020-04-21 RX ORDER — ONDANSETRON 2 MG/ML
4 INJECTION INTRAMUSCULAR; INTRAVENOUS ONCE
Status: DISCONTINUED | OUTPATIENT
Start: 2020-04-21 | End: 2020-04-21

## 2020-04-21 RX ORDER — ONDANSETRON 4 MG/1
4 TABLET, FILM COATED ORAL EVERY 6 HOURS PRN
Status: DISCONTINUED | OUTPATIENT
Start: 2020-04-21 | End: 2020-04-21 | Stop reason: HOSPADM

## 2020-04-21 RX ORDER — HYDROXYZINE HYDROCHLORIDE 25 MG/1
50 TABLET, FILM COATED ORAL EVERY 6 HOURS PRN
Status: DISCONTINUED | OUTPATIENT
Start: 2020-04-21 | End: 2020-04-21 | Stop reason: HOSPADM

## 2020-04-21 RX ORDER — CHOLECALCIFEROL (VITAMIN D3) 125 MCG
5 CAPSULE ORAL NIGHTLY PRN
Status: DISCONTINUED | OUTPATIENT
Start: 2020-04-21 | End: 2020-04-21 | Stop reason: HOSPADM

## 2020-04-21 RX ORDER — LABETALOL HYDROCHLORIDE 5 MG/ML
10 INJECTION, SOLUTION INTRAVENOUS ONCE
Status: COMPLETED | OUTPATIENT
Start: 2020-04-21 | End: 2020-04-21

## 2020-04-21 RX ORDER — SODIUM CHLORIDE 0.9 % (FLUSH) 0.9 %
10 SYRINGE (ML) INJECTION AS NEEDED
Status: DISCONTINUED | OUTPATIENT
Start: 2020-04-21 | End: 2020-04-21 | Stop reason: HOSPADM

## 2020-04-21 RX ORDER — ALUMINA, MAGNESIA, AND SIMETHICONE 2400; 2400; 240 MG/30ML; MG/30ML; MG/30ML
15 SUSPENSION ORAL EVERY 6 HOURS PRN
Status: DISCONTINUED | OUTPATIENT
Start: 2020-04-21 | End: 2020-04-21 | Stop reason: HOSPADM

## 2020-04-21 RX ORDER — SODIUM CHLORIDE, SODIUM LACTATE, POTASSIUM CHLORIDE, CALCIUM CHLORIDE 600; 310; 30; 20 MG/100ML; MG/100ML; MG/100ML; MG/100ML
150 INJECTION, SOLUTION INTRAVENOUS CONTINUOUS
Status: DISCONTINUED | OUTPATIENT
Start: 2020-04-21 | End: 2020-04-21 | Stop reason: HOSPADM

## 2020-04-21 RX ORDER — LOSARTAN POTASSIUM 25 MG/1
12.5 TABLET ORAL DAILY
Qty: 15 TABLET | Refills: 2 | Status: SHIPPED | OUTPATIENT
Start: 2020-04-21 | End: 2020-07-21 | Stop reason: ALTCHOICE

## 2020-04-21 RX ORDER — NITROGLYCERIN 0.4 MG/1
0.4 TABLET SUBLINGUAL
Status: DISCONTINUED | OUTPATIENT
Start: 2020-04-21 | End: 2020-04-21 | Stop reason: HOSPADM

## 2020-04-21 RX ORDER — ONDANSETRON 2 MG/ML
4 INJECTION INTRAMUSCULAR; INTRAVENOUS EVERY 6 HOURS PRN
Status: DISCONTINUED | OUTPATIENT
Start: 2020-04-21 | End: 2020-04-21 | Stop reason: HOSPADM

## 2020-04-21 RX ORDER — ONDANSETRON 2 MG/ML
INJECTION INTRAMUSCULAR; INTRAVENOUS
Status: COMPLETED
Start: 2020-04-21 | End: 2020-04-21

## 2020-04-21 RX ORDER — ACETAMINOPHEN 160 MG/5ML
650 SOLUTION ORAL EVERY 4 HOURS PRN
Status: DISCONTINUED | OUTPATIENT
Start: 2020-04-21 | End: 2020-04-21

## 2020-04-21 RX ORDER — ACETAMINOPHEN 325 MG/1
650 TABLET ORAL EVERY 4 HOURS PRN
Status: DISCONTINUED | OUTPATIENT
Start: 2020-04-21 | End: 2020-04-21 | Stop reason: HOSPADM

## 2020-04-21 RX ORDER — LISINOPRIL 2.5 MG/1
2.5 TABLET ORAL
Status: COMPLETED | OUTPATIENT
Start: 2020-04-21 | End: 2020-04-21

## 2020-04-21 RX ORDER — METOPROLOL SUCCINATE 25 MG/1
25 TABLET, EXTENDED RELEASE ORAL DAILY
Qty: 30 TABLET | Refills: 2 | Status: SHIPPED | OUTPATIENT
Start: 2020-04-21 | End: 2020-06-10 | Stop reason: SINTOL

## 2020-04-21 RX ORDER — ACETAMINOPHEN 650 MG/1
650 SUPPOSITORY RECTAL EVERY 4 HOURS PRN
Status: DISCONTINUED | OUTPATIENT
Start: 2020-04-21 | End: 2020-04-21 | Stop reason: HOSPADM

## 2020-04-21 RX ORDER — ONDANSETRON 2 MG/ML
4 INJECTION INTRAMUSCULAR; INTRAVENOUS ONCE
Status: COMPLETED | OUTPATIENT
Start: 2020-04-21 | End: 2020-04-21

## 2020-04-21 RX ORDER — ASPIRIN 325 MG
325 TABLET ORAL ONCE
Status: COMPLETED | OUTPATIENT
Start: 2020-04-21 | End: 2020-04-21

## 2020-04-21 RX ADMIN — ONDANSETRON 4 MG: 2 INJECTION INTRAMUSCULAR; INTRAVENOUS at 03:07

## 2020-04-21 RX ADMIN — ASPIRIN 325 MG ORAL TABLET 325 MG: 325 PILL ORAL at 02:48

## 2020-04-21 RX ADMIN — NITROGLYCERIN 0.4 MG: 0.4 TABLET SUBLINGUAL at 02:47

## 2020-04-21 RX ADMIN — SODIUM CHLORIDE, SODIUM LACTATE, POTASSIUM CHLORIDE, AND CALCIUM CHLORIDE 150 ML/HR: 600; 310; 30; 20 INJECTION, SOLUTION INTRAVENOUS at 04:20

## 2020-04-21 RX ADMIN — NITROGLYCERIN 1 INCH: 20 OINTMENT TOPICAL at 03:11

## 2020-04-21 RX ADMIN — LISINOPRIL 2.5 MG: 2.5 TABLET ORAL at 10:21

## 2020-04-21 RX ADMIN — ENOXAPARIN SODIUM 40 MG: 40 INJECTION SUBCUTANEOUS at 16:22

## 2020-04-21 RX ADMIN — LABETALOL 20 MG/4 ML (5 MG/ML) INTRAVENOUS SYRINGE 10 MG: at 02:48

## 2020-04-21 RX ADMIN — SODIUM CHLORIDE, SODIUM LACTATE, POTASSIUM CHLORIDE, AND CALCIUM CHLORIDE 150 ML/HR: 600; 310; 30; 20 INJECTION, SOLUTION INTRAVENOUS at 10:21

## 2020-04-21 RX ADMIN — METOPROLOL TARTRATE 12.5 MG: 25 TABLET, FILM COATED ORAL at 10:20

## 2020-04-21 NOTE — PROGRESS NOTES
Discharge Planning Assessment  [facility]     Patient Name: Vibha Darling  MRN: [id number]  Today's Date: 4/21/2020    Admit Date: 4/21/2020    Discharge Needs Assessment     Row Name 04/21/20 0859       Living Environment    Lives With  spouse    Current Living Arrangements  home/apartment/condo    Primary Care Provided by  self    Quality of Family Relationships  supportive    Able to Return to Prior Arrangements  yes       Resource/Environmental Concerns    Transportation Concerns  car, none       Discharge Needs Assessment    Readmission Within the Last 30 Days  no previous admission in last 30 days    Anticipated Changes Related to Illness  none    Equipment Needed After Discharge  none        Discharge Plan     Row Name 04/21/20 0900       Plan    Plan  D/C Plan : Anticipate routine to home . covid-19 pending .    Plan Comments  Pt was admitted for C/P . Pt's Troponin in normal . Cardiology consulted . COVID-19 pending .                  Demographic Summary     Row Name 04/21/20 0858       General Information    Admission Type  observation    Arrived From  emergency department    Preferred Language  English     Used During This Interaction  no        Functional Status     Row Name 04/21/20 0858       Functional Status    Usual Activity Tolerance  excellent    Current Activity Tolerance  excellent       Functional Status, IADL    Medications  independent    Meal Preparation  independent    Housekeeping  independent    Laundry  independent    Shopping  independent       Mental Status    General Appearance WDL  WDL                Pema Regan RN

## 2020-04-21 NOTE — ED NOTES
Pt reports R side CP that started when she went to bed, neck and nose pain several days ago, and dry cough for a few days.      Erika Bazan LPN  04/21/20 0221    Pt reports hx HTN, states she forgot to take her Lisinopril before bed tonight     Erika Bazan LPN  04/21/20 0225    Pt reports she had a stress test x1 year ago that was normal     Erika Bazan LPN  04/21/20 0225

## 2020-04-21 NOTE — ED PROVIDER NOTES
Subjective   History of Present Illness  49-year-old female with a one-week history of cough as well as pain in the right side of the face radiating to the right side of the neck.  She states that she felt congested.  She has not had any sputum production.  There is been no hemoptysis.  She has had no pain or swelling in the legs.  The patient states that tonight about 4 hours ago she developed a aching pain in the right side of the chest into the right shoulder and arm.  This seemed to be improved by raising the right arm.  The patient denies any trauma.  She states that she took Tylenol and had some improvement but not complete resolution and came to the emergency department.  She does have a history of hypertension and is on lisinopril but did not take her dose today.  Patient works as a nurse practitioner.  She states she has not been tested for COVID.  No history of chronic lung disease.  She complains of nausea up until today.  She denies any vomiting or diarrhea.  She states that 3 days ago she had a 2-day history of generalized aches and pains.  She is not having that currently.  Patient states that she had a stress test for tachycardia about a year and a half ago that was normal.  Review of Systems   Constitutional: Positive for appetite change. Negative for fever.   HENT: Positive for congestion and sinus pain.    Eyes: Negative.    Respiratory: Positive for chest tightness and shortness of breath.    Cardiovascular: Positive for chest pain.   Gastrointestinal: Negative.    Endocrine: Negative.    Genitourinary: Negative.    Musculoskeletal: Positive for myalgias.   Skin: Negative.    Allergic/Immunologic: Negative.    Neurological: Negative.    Hematological: Negative.    Psychiatric/Behavioral: Negative.        Past Medical History:   Diagnosis Date   • Hyperlipidemia    • Hypertension        Allergies   Allergen Reactions   • Doxycycline Other (See Comments) and Rash     Flushing     • Dye Fdc Red [Red  Dye] Anaphylaxis   • Levofloxacin Other (See Comments) and Rash     Flushing     • Ciprofloxacin Other (See Comments)     Flushing    • Contrast Dye Itching       Past Surgical History:   Procedure Laterality Date   • CHOLECYSTECTOMY     • COLONOSCOPY     • DILATATION AND CURETTAGE     • ECTOPIC PREGNANCY SURGERY         No family history on file.    Social History     Socioeconomic History   • Marital status:      Spouse name: Not on file   • Number of children: Not on file   • Years of education: Not on file   • Highest education level: Not on file   Tobacco Use   • Smoking status: Never Smoker   Substance and Sexual Activity   • Alcohol use: No     Frequency: Never   • Drug use: No   • Sexual activity: Defer           Objective   Physical Exam  On exam she is awake and alert she is afebrile her blood pressure initially was elevated at 179/117 it was 165/105 when I saw her in the emergency department.  Her heart rate 116 respirations 18 O2 sat was 97% on room air.  HEENT exam reveals no obvious abnormality the patient has tenderness to palpation over the frontal and maxillary sinuses.  Pupils equal round reactive to light EOMs are full ENT was otherwise unremarkable.  Neck is supple without adenopathy the chest was clear nontender the cardiovascular exam reveals a regular rhythm without a gallop or murmur slightly tachycardic at 110 the abdomen was soft nontender she has no cyanosis clubbing or edema no tenderness in the calves negative Homans and no rashes present neurologic exam is normal  Procedures           ED Course      EKG shows a sinus tachycardia 110 with left atrial enlargement and possible old anterior infarct.           Results for orders placed or performed during the hospital encounter of 04/21/20   Comprehensive Metabolic Panel   Result Value Ref Range    Glucose 118 (H) 65 - 99 mg/dL    BUN 11 6 - 20 mg/dL    Creatinine 0.97 0.57 - 1.00 mg/dL    Sodium 140 136 - 145 mmol/L    Potassium 3.9  3.5 - 5.2 mmol/L    Chloride 100 98 - 107 mmol/L    CO2 24.0 22.0 - 29.0 mmol/L    Calcium 9.8 8.6 - 10.5 mg/dL    Total Protein 7.9 6.0 - 8.5 g/dL    Albumin 4.70 3.50 - 5.20 g/dL    ALT (SGPT) 105 (H) 1 - 33 U/L    AST (SGOT) 65 (H) 1 - 32 U/L    Alkaline Phosphatase 85 39 - 117 U/L    Total Bilirubin 0.2 0.2 - 1.2 mg/dL    eGFR Non African Amer 61 >60 mL/min/1.73    Globulin 3.2 gm/dL    A/G Ratio 1.5 g/dL    BUN/Creatinine Ratio 11.3 7.0 - 25.0    Anion Gap 16.0 (H) 5.0 - 15.0 mmol/L   BNP   Result Value Ref Range    proBNP <5.0 (L) 5.0 - 450.0 pg/mL   D-dimer, Quantitative   Result Value Ref Range    D-Dimer, Quantitative 0.50 0.17 - 0.59 MCGFEU/mL   Troponin   Result Value Ref Range    Troponin T <0.010 0.000 - 0.030 ng/mL   CBC Auto Differential   Result Value Ref Range    WBC 9.10 3.40 - 10.80 10*3/mm3    RBC 4.73 3.77 - 5.28 10*6/mm3    Hemoglobin 15.5 12.0 - 15.9 g/dL    Hematocrit 42.8 34.0 - 46.6 %    MCV 90.5 79.0 - 97.0 fL    MCH 32.8 26.6 - 33.0 pg    MCHC 36.2 (H) 31.5 - 35.7 g/dL    RDW 13.6 12.3 - 15.4 %    RDW-SD 43.3 37.0 - 54.0 fl    MPV 8.0 6.0 - 12.0 fL    Platelets 343 140 - 450 10*3/mm3    Neutrophil % 52.7 42.7 - 76.0 %    Lymphocyte % 35.7 19.6 - 45.3 %    Monocyte % 9.1 5.0 - 12.0 %    Eosinophil % 1.6 0.3 - 6.2 %    Basophil % 0.9 0.0 - 1.5 %    Neutrophils, Absolute 4.80 1.70 - 7.00 10*3/mm3    Lymphocytes, Absolute 3.30 (H) 0.70 - 3.10 10*3/mm3    Monocytes, Absolute 0.80 0.10 - 0.90 10*3/mm3    Eosinophils, Absolute 0.10 0.00 - 0.40 10*3/mm3    Basophils, Absolute 0.10 0.00 - 0.20 10*3/mm3    nRBC 0.1 0.0 - 0.2 /100 WBC   Scan Slide   Result Value Ref Range    Dacrocytes Slight/1+ None Seen    Poikilocytes Slight/1+ None Seen    WBC Morphology Normal Normal    Platelet Estimate Adequate Normal    Large Platelets Slight/1+ None Seen   Light Blue Top   Result Value Ref Range    Extra Tube hold for add-on    Green Top (Gel)   Result Value Ref Range    Extra Tube Hold for add-ons.     Lavender Top   Result Value Ref Range    Extra Tube done    Gold Top - SST   Result Value Ref Range    Extra Tube Hold for add-ons.      Medications   sodium chloride 0.9 % flush 10 mL (has no administration in time range)   sodium chloride 0.9 % flush 10 mL (has no administration in time range)   nitroglycerin (NITROSTAT) SL tablet 0.4 mg (0.4 mg Sublingual Given 4/21/20 0247)   lisinopril (PRINIVIL,ZESTRIL) tablet 2.5 mg (has no administration in time range)   aspirin tablet 325 mg (325 mg Oral Given 4/21/20 0248)   labetalol (NORMODYNE,TRANDATE) injection 10 mg (10 mg Intravenous Given 4/21/20 0248)   ondansetron (ZOFRAN) injection 4 mg (4 mg Intravenous Given 4/21/20 0307)   nitroglycerin (NITROSTAT) ointment 1 inch (1 inch Topical Given 4/21/20 0311)     No radiology results for the last day    Chest x-ray was unremarkable.  CT sinuses was negative                           MDM  The patient had a sudden onset of chest pain that was relieved with nitroglycerin.  Her EKG shows a sinus tachycardia with left atrial enlargement as well as poor R wave progression across the precordial leads but no evidence of definite acute MI.  Patient's initial troponin as well as her dimer were negative.  She has a slight bump in her ALT and AST although she is on a statin.  The patient remained stable on nitroglycerin in the emergency department and will be admitted for further cardiac evaluation.  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  The patient's hypertension was treated with p.o. lisinopril as well as IV labetalol and her pressure decreased to 150/90.  Heart rate was 88.  Final diagnoses:   Chest pain in adult   Cough   Essential hypertension            Abraham Castaneda MD  04/21/20 7557

## 2020-04-21 NOTE — PLAN OF CARE
Problem: Cardiac: ACS (Acute Coronary Syndrome) (Adult)  Goal: Signs and Symptoms of Listed Potential Problems Will be Absent, Minimized or Managed (Cardiac: ACS)  Flowsheets (Taken 4/21/2020 1516)  Problems Assessed (Acute Coronary Syndrome): all  Problems Present (Acute Coronary Syn): none     Problem: Patient Care Overview  Goal: Plan of Care Review  Flowsheets  Taken 4/21/2020 1516  Progress: improving  Taken 4/21/2020 1200  Plan of Care Reviewed With: patient  Note:   Covid test negative, will transfer to floor and planning for stress test. No complaints of chest pain on my shift

## 2020-04-21 NOTE — H&P
[facility] Medicine Services      Patient Name: Vibha Darling  : 1970  MRN: [id number]  Primary Care Physician: Lorena Metzger MD  Date of admission: 2020    Patient Care Team:  Lorena Metzger MD as PCP - General (Family Medicine)          Subjective   History Present Illness     Chief Complaint:   Chief Complaint   Patient presents with   • Chest Pain     cough       Ms. Darling is a 49 y.o. female with a history of hypertension and hyperlipidemia who presents to [facility] ED on 2020 complaining of right-sided chest pain. The patient states she developed a dry, nonproductive cough approximately 10 days ago. She states her PCP evaluated her and thought she had postnasal drip. She states she then developed some right-sided face and neck pain, as well as congestion and thought she had sinusitis. She states 3 days ago she started with generalized body aches. She states her skin hurt to touch. She also reports nausea, but denies vomiting or diarrhea. She denies fever or chills. She states her symptoms started to improve 2 days ago, but then last night she developed severe right-sided chest pain. She describes it as a belt wrapped around her right chest. She also describes it as a heaviness and tightness. She also reports right shoulder pressure, but states that is chronic and intermittent. She denies any shortness of breath, but reports some recent exercise intolerance. She reports increased anxiety for the past 5 weeks since the coronavirus pandemic. She states she has lost 9 lbs from her anxiety and lack of appetite during that time.     The patient has never smoked. She denies alcohol or illicit drug use. 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 She denies a history of coronary artery disease or diabetes. She states she had a normal stress test done approximately 1.5 years ago. She reports a family history of heart disease in her father  "and his 3 siblings. She states her aunt and uncles  during their sleep at an early age.     Upon arrival to the ER the patient was hypertensive (179/117) and tachycardic (116). She was given 1 sublingual nitroglycerin, aspirin 325 mg PO, labetalol 10 mg IV, Zofran 4 mg IV, and 1\" nitro paste was applied. Her troponin, proBNP, and dimer were normal. Her EKG showed sinus tachycardia with probably left atrial enlargement, which is unchanged from 11/15/2019 EKG. Her CBC and CMP were unremarkable except AST 65 and . Her procalcitonin was low at 0.07, her LDH was elevated at 261, and her ferritin and CRP were within normal limits. Her respiratory virus panel was negative. Her CXR showed no acute findings. Her CT facial bones showed no acute findings. COVID-19 test pending. She was admitted for observation and further evaluation.          Review of Systems   Constitution: Positive for weight loss. Negative for chills and fever.   HENT: Positive for congestion and ear pain.         Right ear   Cardiovascular: Positive for chest pain.        Right-sided   Respiratory: Positive for cough. Negative for shortness of breath and sputum production.    Musculoskeletal: Positive for myalgias.   Gastrointestinal: Positive for nausea. Negative for abdominal pain, diarrhea and vomiting.   All other systems reviewed and are negative.        Personal History     Past Medical History:   Past Medical History:   Diagnosis Date   • Hyperlipidemia    • Hypertension    • Overweight (BMI 25.0-29.9) 2020       Surgical History:      Past Surgical History:   Procedure Laterality Date   • CHOLECYSTECTOMY     • COLONOSCOPY     • DILATATION AND CURETTAGE     • ECTOPIC PREGNANCY SURGERY         Family History: family history includes Alcohol abuse in her paternal uncle; Heart disease in her father, paternal aunt, paternal uncle, and paternal uncle; Thrombophilia in her paternal uncle. Otherwise pertinent FHx was reviewed and " unremarkable.     Social History:  reports that she has never smoked. She has never used smokeless tobacco. She reports that she does not drink alcohol or use drugs.      Medications:  Prior to Admission medications    Medication Sig Start Date End Date Taking? Authorizing Provider   lisinopril (PRINIVIL,ZESTRIL) 2.5 MG tablet Take 1 tablet by mouth Daily. 1/23/20   Lorena Metzger MD   PREVALITE 4 GM/DOSE powder TAKE 1 PACKET BY MOUTH DAILY 2/13/20   Lorena Metzger MD   busPIRone (BUSPAR) 5 MG tablet Take 1 tablet by mouth 3 (Three) Times a Day As Needed (anxiety). 12/10/19 4/21/20  Lorena Metzger MD   meclizine (ANTIVERT) 25 MG tablet Take 1 tablet by mouth 3 (Three) Times a Day As Needed for Dizziness. 11/8/19 4/21/20  Lorena Metzger MD   neomycin-polymyxin-hydrocortisone (CORTISPORIN) 1 % solution otic solution Administer 3 drops into both ears 4 (Four) Times a Day. 4/16/20 4/21/20  Lorena Metzger MD   valACYclovir (Valtrex) 500 MG tablet Take 1 tablet by mouth 2 (Two) Times a Day. 3/24/20 4/21/20  Lorena Metzger MD       Allergies:    Allergies   Allergen Reactions   • Doxycycline Other (See Comments) and Rash     Flushing     • Dye Fdc Red [Red Dye] Anaphylaxis   • Levofloxacin Other (See Comments) and Rash     Flushing     • Ciprofloxacin Other (See Comments)     Flushing    • Contrast Dye Itching       Objective   Objective     Vital Signs  Temp:  [97.2 °F (36.2 °C)-98 °F (36.7 °C)] 97.2 °F (36.2 °C)  Heart Rate:  [] 90  Resp:  [18-20] 20  BP: (137-179)/() 137/74  SpO2:  [94 %-97 %] 96 %  on   ;   Device (Oxygen Therapy): room air  Body mass index is 29.09 kg/m².        Physical Exam  ---ATTENDING MD TO COMPLETE        Results Review:  I have personally reviewed most recent cardiac tracings, lab results, microbiology results and radiology images and interpretations and agree with findings.    Results from last 7 days   Lab Units 04/21/20  0619   WBC 10*3/mm3 8.30   HEMOGLOBIN g/dL 14.7    HEMATOCRIT % 40.3   PLATELETS 10*3/mm3 294     Results from last 7 days   Lab Units 04/21/20  0619 04/21/20  0232   SODIUM mmol/L 139 140   POTASSIUM mmol/L 4.1 3.9   CHLORIDE mmol/L 101 100   CO2 mmol/L 23.0 24.0   BUN mg/dL 12 11   CREATININE mg/dL 0.99 0.97   GLUCOSE mg/dL 113* 118*   CALCIUM mg/dL 9.7 9.8   ALT (SGPT) U/L 114* 105*   AST (SGOT) U/L 81* 65*   TROPONIN T ng/mL <0.010 <0.010   PROBNP pg/mL  --  <5.0*   PROCALCITONIN ng/mL  --  0.07*     Estimated Creatinine Clearance: 74.1 mL/min (by C-G formula based on SCr of 0.99 mg/dL).  Brief Urine Lab Results  (Last result in the past 365 days)      Color   Clarity   Blood   Leuk Est   Nitrite   Protein   CREAT   Urine HCG        01/02/20 1516 Yellow Clear 2+ Negative Positive Negative               Microbiology Results (last 10 days)     Procedure Component Value - Date/Time    Respiratory Panel, PCR - Swab, Nasopharynx [[id number]]  (Normal) Collected:  04/21/20 0245    Lab Status:  Final result Specimen:  Swab from Nasopharynx Updated:  04/21/20 0414     ADENOVIRUS, PCR Not Detected     Coronavirus 229E Not Detected     Coronavirus HKU1 Not Detected     Coronavirus NL63 Not Detected     Coronavirus OC43 Not Detected     Human Metapneumovirus Not Detected     Human Rhinovirus/Enterovirus Not Detected     Influenza B PCR Not Detected     Parainfluenza Virus 1 Not Detected     Parainfluenza Virus 2 Not Detected     Parainfluenza Virus 3 Not Detected     Parainfluenza Virus 4 Not Detected     Bordetella pertussis pcr Not Detected     Influenza A H1 2009 PCR Not Detected     Chlamydophila pneumoniae PCR Not Detected     Mycoplasma pneumo by PCR Not Detected     Influenza A PCR Not Detected     Influenza A H3 Not Detected     Influenza A H1 Not Detected     RSV, PCR Not Detected    Narrative:       The coronavirus on the RVP is NOT COVID-19 and is NOT indicative of infection with COVID-19.           ECG/EMG Results (most recent)     Procedure Component Value  Units Date/Time    ECG 12 Lead [[id number]] Collected:  04/21/20 0223     Updated:  04/21/20 0225    Narrative:       HEART RATE= 109  bpm  RR Interval= 548  ms  MS Interval= 150  ms  P Horizontal Axis= -2  deg  P Front Axis= 32  deg  QRSD Interval= 96  ms  QT Interval= 328  ms  QRS Axis= -50  deg  T Wave Axis= 7  deg  - ABNORMAL ECG -  Sinus tachycardia  Probable left atrial enlargement  Inferior infarct, old  Consider anterior infarct  When compared with ECG of 15-Nov-2019 10:27:06,  No significant change  Electronically Signed By:   Date and Time of Study: 2020-04-21 02:23:50            Xr Chest 1 View    Result Date: 4/21/2020  No radiographic findings of acute cardiopulmonary abnormality.  Electronically Signed By-DR. Rashel Pena MD On:4/21/2020 7:18 AM This report was finalized on [id number] by DR. Rashel Pena MD.    Ct Facial Bones Without Contrast    Result Date: 4/21/2020  Unremarkable maxillofacial CT. Electronically signed by:  Myke Alonzo M.D.  4/21/2020 1:59 AM        Estimated Creatinine Clearance: 74.1 mL/min (by C-G formula based on SCr of 0.99 mg/dL).    Assessment/Plan   Assessment/Plan       Active Hospital Problems    Diagnosis  POA   • **Chest pain in adult [R07.9]  Yes     Priority: High   • Cough [R05]  Yes     Priority: High   • Hypertensive urgency [I16.0]  Yes     Priority: Medium   • Tachycardia [R00.0]  Yes     Priority: Medium   • Generalized anxiety disorder [F41.1]  Yes     Priority: Medium   • Elevated LFTs [R94.5]  Yes     Priority: Low   • Mixed hyperlipidemia [E78.2]  Yes   • Overweight (BMI 25.0-29.9) [E66.3]  Yes   • Essential hypertension [I10]  Yes      Resolved Hospital Problems   No resolved problems to display.       Chest pain in adult  -atypical, right-sided pain  -r/o ACS, suspect secondary to HTN urgency and anxiety  -serial troponin  -EKG:  ST with probable LAE  -patient reports normal stress test approx. 1.5 years ago  -continuous cardiac monitoring  -given  "SL NTG x 1, aspirin 325 mg PO, and 1\" nitro paste in ER  -SL NTG PRN pain    Cough  -etiology unclear----possibly secondary to postnasal drip vs ACE-I vs COVID-19 vs other etiology  -CT facial:  No acute findings  -RVP negative  -COVID-19 test pending, enhanced droplet/contact isolation precautions for now  -consider discontinuing ACE-I    Generalized anxiety disorder  -start hydroxyzine 50 mg PO q6h PRN   -monitor     Hypertensive urgency, Essential hypertension  -BP improved in ER with nitroglycerin and IV labetalol given  -continue home lisinopril  -add metoprolol 12.5 mg PO BID  -closely monitor BP    Tachycardia   -start BB as above  -continuous cardiac monitoring     Elevated LFTs  -acute on chronic? Patient reports baseline AST/ALT 50s/60s  -needs outpatient follow up with PCP    Mixed hyperlipidemia  -patient on Prevalite at home  -lipid panel:  Total chol 258, Trigly 400,   -consider adding statin and closely monitoring LFTs  -encourage lifestyle modifications     Overweight (BMI 25.0-29.9)  -BMI 29.09  -encourage lifestyle modifications         VTE Prophylaxis -   Mechanical Order History:     None      Pharmalogical Order History:     Ordered     Dose Route Frequency Stop    04/21/20 0358  enoxaparin (LOVENOX) syringe 40 mg      40 mg SC Every 24 Hours --          CODE STATUS:    Code Status and Medical Interventions:   Ordered at: 04/21/20 0358     Level Of Support Discussed With:    Patient     Code Status:    CPR     Medical Interventions (Level of Support Prior to Arrest):    Full       This patient has been examined wearing appropriate Personal Protective Equipment. 04/21/20      I discussed the patient's findings and my recommendations with patient.        Electronically signed by BLAIR Guzman, 04/21/20, 8:10 AM.  [facility] Hospitalist Team        "

## 2020-04-22 ENCOUNTER — EPISODE CHANGES (OUTPATIENT)
Dept: CASE MANAGEMENT | Facility: OTHER | Age: 50
End: 2020-04-22

## 2020-04-22 ENCOUNTER — OFFICE VISIT (OUTPATIENT)
Dept: CARDIOLOGY | Facility: CLINIC | Age: 50
End: 2020-04-22

## 2020-04-22 ENCOUNTER — READMISSION MANAGEMENT (OUTPATIENT)
Dept: CALL CENTER | Facility: HOSPITAL | Age: 50
End: 2020-04-22

## 2020-04-22 DIAGNOSIS — R07.89 CHEST PAIN, ATYPICAL: Primary | ICD-10-CM

## 2020-04-22 DIAGNOSIS — I10 ESSENTIAL HYPERTENSION: Chronic | ICD-10-CM

## 2020-04-22 DIAGNOSIS — E78.2 MIXED HYPERLIPIDEMIA: Chronic | ICD-10-CM

## 2020-04-22 DIAGNOSIS — R94.31 ABNORMAL ELECTROCARDIOGRAM (ECG) (EKG): ICD-10-CM

## 2020-04-22 DIAGNOSIS — E66.3 OVERWEIGHT (BMI 25.0-29.9): Chronic | ICD-10-CM

## 2020-04-22 DIAGNOSIS — F41.1 GENERALIZED ANXIETY DISORDER: ICD-10-CM

## 2020-04-22 PROCEDURE — 99442 PR PHYS/QHP TELEPHONE EVALUATION 11-20 MIN: CPT | Performed by: NURSE PRACTITIONER

## 2020-04-22 NOTE — PAYOR COMM NOTE
"Admit clinical     Admit in observation status through the ER on 4/21/20  Discharged home evening of 4/21/20  MD notes attached.  ---------------  Chest Pain: Observation Care (OC-009)  •Observation is appropriate for patient with chest pain and 1 or more of the following:  ?Suspected cardiac ischemia with nondiagnostic initial evaluation (eg, ECG, cardiac biomarkers) requiring further evaluation (eg, serial troponin tests)  --------------    AUTHORIZATION PENDING:   PLEASE FAX OR CALL DETERMINATION TO CONTACT BELOW:       THANK YOU,    TRISTON Altamirano, RN  Utilization Review  [facility]  Phone: [phone]  Fax: [phone]      NPI: [id number]  Tax ID: [id number]      Vibha Darling (49 y.o. Female)     Date of Birth Social Security Number Address Home Phone MRN    1970  [facility] VIVIANA FERRER [address] [place] [address] [phone] [id number]    Spiritism Marital Status          None        Admission Date Admission Type Admitting Provider Attending Provider Department, Room/Bed    4/21/20 Emergency Elsie Byrd MD  [facility] PROGRESS CARE, 2128/1    Discharge Date Discharge Disposition Discharge Destination        4/21/2020 Home or Self Care              Attending Provider:  (none)   Allergies:  Doxycycline, Dye Fdc Red [Red Dye], Levofloxacin, Ciprofloxacin, Contrast Dye    Isolation:  None   Infection:  None   Code Status:  Prior    Ht:  167.6 cm (65.98\")   Wt:  81.7 kg (180 lb 1.9 oz)    Admission Cmt:  None   Principal Problem:  Chest pain in adult [R07.9]                 Active Insurance as of 4/21/2020     Primary Coverage     Payor Plan Insurance Group Employer/Plan Group    [employer]EE [id number]     Payor Plan Address Payor Plan Phone Number Payor Plan Fax Number Effective Dates    [address] [phone]  1/1/2020 - None Entered    [place] [address]       Subscriber Name Subscriber Birth Date Member ID       " DOUG DARLING 1970 [id number]                 Emergency Contacts      (Rel.) Home Phone Work Phone Mobile Phone    NADIYA ANGEL (Daughter) -- -- [phone]    DEMETRIA OLIVA (Spouse) -- -- [phone]               History & Physical      Nancy Dickinson, APRN at 20 0810                [facility] Medicine Services      Patient Name: Doug Darling  : 1970  MRN: [id number]  Primary Care Physician: Lorena Metzger MD  Date of admission: 2020    Patient Care Team:  Lorena Metzger MD as PCP - General (Family Medicine)          Subjective   History Present Illness     Chief Complaint:   Chief Complaint   Patient presents with   • Chest Pain     cough       Ms. Darling is a 49 y.o. female with a history of hypertension and hyperlipidemia who presents to [facility] ED on 2020 complaining of right-sided chest pain. The patient states she developed a dry, nonproductive cough approximately 10 days ago. She states her PCP evaluated her and thought she had postnasal drip. She states she then developed some right-sided face and neck pain, as well as congestion and thought she had sinusitis. She states 3 days ago she started with generalized body aches. She states her skin hurt to touch. She also reports nausea, but denies vomiting or diarrhea. She denies fever or chills. She states her symptoms started to improve 2 days ago, but then last night she developed severe right-sided chest pain. She describes it as a belt wrapped around her right chest. She also describes it as a heaviness and tightness. She also reports right shoulder pressure, but states that is chronic and intermittent. She denies any shortness of breath, but reports some recent exercise intolerance. She reports increased anxiety for the past 5 weeks since the coronavirus pandemic. She states she has lost 9 lbs from her anxiety and lack of appetite during that time.     The patient  "has never smoked. She denies alcohol or illicit drug use. 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 She denies a history of coronary artery disease or diabetes. She states she had a normal stress test done approximately 1.5 years ago. She reports a family history of heart disease in her father and his 3 siblings. She states her aunt and uncles  during their sleep at an early age.     Upon arrival to the ER the patient was hypertensive (179/117) and tachycardic (116). She was given 1 sublingual nitroglycerin, aspirin 325 mg PO, labetalol 10 mg IV, Zofran 4 mg IV, and 1\" nitro paste was applied. Her troponin, proBNP, and dimer were normal. Her EKG showed sinus tachycardia with probably left atrial enlargement, which is unchanged from 11/15/2019 EKG. Her CBC and CMP were unremarkable except AST 65 and . Her procalcitonin was low at 0.07, her LDH was elevated at 261, and her ferritin and CRP were within normal limits. Her respiratory virus panel was negative. Her CXR showed no acute findings. Her CT facial bones showed no acute findings. COVID-19 test pending. She was admitted for observation and further evaluation.          Review of Systems   Constitution: Positive for weight loss. Negative for chills and fever.   HENT: Positive for congestion and ear pain.         Right ear   Cardiovascular: Positive for chest pain.        Right-sided   Respiratory: Positive for cough. Negative for shortness of breath and sputum production.    Musculoskeletal: Positive for myalgias.   Gastrointestinal: Positive for nausea. Negative for abdominal pain, diarrhea and vomiting.   All other systems reviewed and are negative.        Personal History     Past Medical History:   Past Medical History:   Diagnosis Date   • Hyperlipidemia    • Hypertension    • Overweight (BMI 25.0-29.9) 2020       Surgical History:      Past Surgical History:   Procedure Laterality Date   • CHOLECYSTECTOMY     • COLONOSCOPY    "   • DILATATION AND CURETTAGE     • ECTOPIC PREGNANCY SURGERY         Family History: family history includes Alcohol abuse in her paternal uncle; Heart disease in her father, paternal aunt, paternal uncle, and paternal uncle; Thrombophilia in her paternal uncle. Otherwise pertinent FHx was reviewed and unremarkable.     Social History:  reports that she has never smoked. She has never used smokeless tobacco. She reports that she does not drink alcohol or use drugs.      Medications:  Prior to Admission medications    Medication Sig Start Date End Date Taking? Authorizing Provider   lisinopril (PRINIVIL,ZESTRIL) 2.5 MG tablet Take 1 tablet by mouth Daily. 1/23/20   Lorena Metzger MD   PREVALITE 4 GM/DOSE powder TAKE 1 PACKET BY MOUTH DAILY 2/13/20   Lorena Metzger MD   busPIRone (BUSPAR) 5 MG tablet Take 1 tablet by mouth 3 (Three) Times a Day As Needed (anxiety). 12/10/19 4/21/20  Lorena Metzger MD   meclizine (ANTIVERT) 25 MG tablet Take 1 tablet by mouth 3 (Three) Times a Day As Needed for Dizziness. 11/8/19 4/21/20  Lorena Metzger MD   neomycin-polymyxin-hydrocortisone (CORTISPORIN) 1 % solution otic solution Administer 3 drops into both ears 4 (Four) Times a Day. 4/16/20 4/21/20  Lorena Metzger MD   valACYclovir (Valtrex) 500 MG tablet Take 1 tablet by mouth 2 (Two) Times a Day. 3/24/20 4/21/20  Lorena Metzger MD       Allergies:    Allergies   Allergen Reactions   • Doxycycline Other (See Comments) and Rash     Flushing     • Dye Fdc Red [Red Dye] Anaphylaxis   • Levofloxacin Other (See Comments) and Rash     Flushing     • Ciprofloxacin Other (See Comments)     Flushing    • Contrast Dye Itching       Objective   Objective     Vital Signs  Temp:  [97.2 °F (36.2 °C)-98 °F (36.7 °C)] 97.2 °F (36.2 °C)  Heart Rate:  [] 90  Resp:  [18-20] 20  BP: (137-179)/() 137/74  SpO2:  [94 %-97 %] 96 %  on   ;   Device (Oxygen Therapy): room air  Body mass index is 29.09 kg/m².        Physical Exam   ---ATTENDING MD TO COMPLETE        Results Review:  I have personally reviewed most recent cardiac tracings, lab results, microbiology results and radiology images and interpretations and agree with findings.    Results from last 7 days   Lab Units 04/21/20  0619   WBC 10*3/mm3 8.30   HEMOGLOBIN g/dL 14.7   HEMATOCRIT % 40.3   PLATELETS 10*3/mm3 294     Results from last 7 days   Lab Units 04/21/20  0619 04/21/20  0232   SODIUM mmol/L 139 140   POTASSIUM mmol/L 4.1 3.9   CHLORIDE mmol/L 101 100   CO2 mmol/L 23.0 24.0   BUN mg/dL 12 11   CREATININE mg/dL 0.99 0.97   GLUCOSE mg/dL 113* 118*   CALCIUM mg/dL 9.7 9.8   ALT (SGPT) U/L 114* 105*   AST (SGOT) U/L 81* 65*   TROPONIN T ng/mL <0.010 <0.010   PROBNP pg/mL  --  <5.0*   PROCALCITONIN ng/mL  --  0.07*     Estimated Creatinine Clearance: 74.1 mL/min (by C-G formula based on SCr of 0.99 mg/dL).  Brief Urine Lab Results  (Last result in the past 365 days)      Color   Clarity   Blood   Leuk Est   Nitrite   Protein   CREAT   Urine HCG        01/02/20 1516 Yellow Clear 2+ Negative Positive Negative               Microbiology Results (last 10 days)     Procedure Component Value - Date/Time    Respiratory Panel, PCR - Swab, Nasopharynx [[id number]]  (Normal) Collected:  04/21/20 0245    Lab Status:  Final result Specimen:  Swab from Nasopharynx Updated:  04/21/20 0414     ADENOVIRUS, PCR Not Detected     Coronavirus 229E Not Detected     Coronavirus HKU1 Not Detected     Coronavirus NL63 Not Detected     Coronavirus OC43 Not Detected     Human Metapneumovirus Not Detected     Human Rhinovirus/Enterovirus Not Detected     Influenza B PCR Not Detected     Parainfluenza Virus 1 Not Detected     Parainfluenza Virus 2 Not Detected     Parainfluenza Virus 3 Not Detected     Parainfluenza Virus 4 Not Detected     Bordetella pertussis pcr Not Detected     Influenza A H1 2009 PCR Not Detected     Chlamydophila pneumoniae PCR Not Detected     Mycoplasma pneumo by PCR Not Detected        Influenza A PCR Not Detected     Influenza A H3 Not Detected     Influenza A H1 Not Detected     RSV, PCR Not Detected    Narrative:       The coronavirus on the RVP is NOT COVID-19 and is NOT indicative of infection with COVID-19.           ECG/EMG Results (most recent)     Procedure Component Value Units Date/Time    ECG 12 Lead [[id number]] Collected:  04/21/20 0223     Updated:  04/21/20 0225    Narrative:       HEART RATE= 109  bpm  RR Interval= 548  ms  WA Interval= 150  ms  P Horizontal Axis= -2  deg  P Front Axis= 32  deg  QRSD Interval= 96  ms  QT Interval= 328  ms  QRS Axis= -50  deg  T Wave Axis= 7  deg  - ABNORMAL ECG -  Sinus tachycardia  Probable left atrial enlargement  Inferior infarct, old  Consider anterior infarct  When compared with ECG of 15-Nov-2019 10:27:06,  No significant change  Electronically Signed By:   Date and Time of Study: 2020-04-21 02:23:50            Xr Chest 1 View    Result Date: 4/21/2020  No radiographic findings of acute cardiopulmonary abnormality.  Electronically Signed By-DR. Rashel Pena MD On:4/21/2020 7:18 AM This report was finalized on [id number] by DR. Rashel Pena MD.    Ct Facial Bones Without Contrast    Result Date: 4/21/2020  Unremarkable maxillofacial CT. Electronically signed by:  Myke Alonzo M.D.  4/21/2020 1:59 AM        Estimated Creatinine Clearance: 74.1 mL/min (by C-G formula based on SCr of 0.99 mg/dL).    Assessment/Plan   Assessment/Plan       Active Hospital Problems    Diagnosis  POA   • **Chest pain in adult [R07.9]  Yes     Priority: High   • Cough [R05]  Yes     Priority: High   • Hypertensive urgency [I16.0]  Yes     Priority: Medium   • Tachycardia [R00.0]  Yes     Priority: Medium   • Generalized anxiety disorder [F41.1]  Yes     Priority: Medium   • Elevated LFTs [R94.5]  Yes     Priority: Low   • Mixed hyperlipidemia [E78.2]  Yes   • Overweight (BMI 25.0-29.9) [E66.3]  Yes   • Essential hypertension [I10]  Yes      Resolved  "Hospital Problems   No resolved problems to display.       Chest pain in adult  -atypical, right-sided pain  -r/o ACS, suspect secondary to HTN urgency and anxiety  -serial troponin  -EKG:  ST with probable LAE  -patient reports normal stress test approx. 1.5 years ago  -continuous cardiac monitoring  -given SL NTG x 1, aspirin 325 mg PO, and 1\" nitro paste in ER  -SL NTG PRN pain    Cough  -etiology unclear----possibly secondary to postnasal drip vs ACE-I vs COVID-19 vs other etiology  -CT facial:  No acute findings  -RVP negative  -COVID-19 test pending, enhanced droplet/contact isolation precautions for now  -consider discontinuing ACE-I    Generalized anxiety disorder  -start hydroxyzine 50 mg PO q6h PRN   -monitor     Hypertensive urgency, Essential hypertension  -BP improved in ER with nitroglycerin and IV labetalol given  -continue home lisinopril  -add metoprolol 12.5 mg PO BID  -closely monitor BP    Tachycardia   -start BB as above  -continuous cardiac monitoring     Elevated LFTs  -acute on chronic? Patient reports baseline AST/ALT 50s/60s  -needs outpatient follow up with PCP    Mixed hyperlipidemia  -patient on Prevalite at home  -lipid panel:  Total chol 258, Trigly 400,   -consider adding statin and closely monitoring LFTs  -encourage lifestyle modifications     Overweight (BMI 25.0-29.9)  -BMI 29.09  -encourage lifestyle modifications         VTE Prophylaxis -   Mechanical Order History:     None      Pharmalogical Order History:     Ordered     Dose Route Frequency Stop    04/21/20 0358  enoxaparin (LOVENOX) syringe 40 mg      40 mg SC Every 24 Hours --          CODE STATUS:    Code Status and Medical Interventions:   Ordered at: 04/21/20 0358     Level Of Support Discussed With:    Patient     Code Status:    CPR     Medical Interventions (Level of Support Prior to Arrest):    Full       This patient has been examined wearing appropriate Personal Protective Equipment. 04/21/20      I " discussed the patient's findings and my recommendations with patient.        Electronically signed by BLAIR Guzman, 04/21/20, 8:10 AM.  [facility] Hospitalist Team          Electronically signed by Nancy Dickinson APRN at 04/21/20 0914          Emergency Department Notes      Abraham Castaneda MD at 04/21/20 0216          Subjective   History of Present Illness  49-year-old female with a one-week history of cough as well as pain in the right side of the face radiating to the right side of the neck.  She states that she felt congested.  She has not had any sputum production.  There is been no hemoptysis.  She has had no pain or swelling in the legs.  The patient states that tonight about 4 hours ago she developed a aching pain in the right side of the chest into the right shoulder and arm.  This seemed to be improved by raising the right arm.  The patient denies any trauma.  She states that she took Tylenol and had some improvement but not complete resolution and came to the emergency department.  She does have a history of hypertension and is on lisinopril but did not take her dose today.  Patient works as a nurse practitioner.  She states she has not been tested for COVID.  No history of chronic lung disease.  She complains of nausea up until today.  She denies any vomiting or diarrhea.  She states that 3 days ago she had a 2-day history of generalized aches and pains.  She is not having that currently.  Patient states that she had a stress test for tachycardia about a year and a half ago that was normal.  Review of Systems   Constitutional: Positive for appetite change. Negative for fever.   HENT: Positive for congestion and sinus pain.    Eyes: Negative.    Respiratory: Positive for chest tightness and shortness of breath.    Cardiovascular: Positive for chest pain.   Gastrointestinal: Negative.    Endocrine: Negative.    Genitourinary: Negative.    Musculoskeletal: Positive for myalgias.   Skin:  Negative.    Allergic/Immunologic: Negative.    Neurological: Negative.    Hematological: Negative.    Psychiatric/Behavioral: Negative.        Past Medical History:   Diagnosis Date   • Hyperlipidemia    • Hypertension        Allergies   Allergen Reactions   • Doxycycline Other (See Comments) and Rash     Flushing     • Dye Fdc Red [Red Dye] Anaphylaxis   • Levofloxacin Other (See Comments) and Rash     Flushing     • Ciprofloxacin Other (See Comments)     Flushing    • Contrast Dye Itching       Past Surgical History:   Procedure Laterality Date   • CHOLECYSTECTOMY     • COLONOSCOPY     • DILATATION AND CURETTAGE     • ECTOPIC PREGNANCY SURGERY         No family history on file.    Social History     Socioeconomic History   • Marital status:      Spouse name: Not on file   • Number of children: Not on file   • Years of education: Not on file   • Highest education level: Not on file   Tobacco Use   • Smoking status: Never Smoker   Substance and Sexual Activity   • Alcohol use: No     Frequency: Never   • Drug use: No   • Sexual activity: Defer           Objective   Physical Exam  On exam she is awake and alert she is afebrile her blood pressure initially was elevated at 179/117 it was 165/105 when I saw her in the emergency department.  Her heart rate 116 respirations 18 O2 sat was 97% on room air.  HEENT exam reveals no obvious abnormality the patient has tenderness to palpation over the frontal and maxillary sinuses.  Pupils equal round reactive to light EOMs are full ENT was otherwise unremarkable.  Neck is supple without adenopathy the chest was clear nontender the cardiovascular exam reveals a regular rhythm without a gallop or murmur slightly tachycardic at 110 the abdomen was soft nontender she has no cyanosis clubbing or edema no tenderness in the calves negative Homans and no rashes present neurologic exam is normal  Procedures          ED Course      EKG shows a sinus tachycardia 110 with left  atrial enlargement and possible old anterior infarct.           Results for orders placed or performed during the hospital encounter of 04/21/20   Comprehensive Metabolic Panel   Result Value Ref Range    Glucose 118 (H) 65 - 99 mg/dL    BUN 11 6 - 20 mg/dL    Creatinine 0.97 0.57 - 1.00 mg/dL    Sodium 140 136 - 145 mmol/L    Potassium 3.9 3.5 - 5.2 mmol/L    Chloride 100 98 - 107 mmol/L    CO2 24.0 22.0 - 29.0 mmol/L    Calcium 9.8 8.6 - 10.5 mg/dL    Total Protein 7.9 6.0 - 8.5 g/dL    Albumin 4.70 3.50 - 5.20 g/dL    ALT (SGPT) 105 (H) 1 - 33 U/L    AST (SGOT) 65 (H) 1 - 32 U/L    Alkaline Phosphatase 85 39 - 117 U/L    Total Bilirubin 0.2 0.2 - 1.2 mg/dL    eGFR Non African Amer 61 >60 mL/min/1.73    Globulin 3.2 gm/dL    A/G Ratio 1.5 g/dL    BUN/Creatinine Ratio 11.3 7.0 - 25.0    Anion Gap 16.0 (H) 5.0 - 15.0 mmol/L   BNP   Result Value Ref Range    proBNP <5.0 (L) 5.0 - 450.0 pg/mL   D-dimer, Quantitative   Result Value Ref Range    D-Dimer, Quantitative 0.50 0.17 - 0.59 MCGFEU/mL   Troponin   Result Value Ref Range    Troponin T <0.010 0.000 - 0.030 ng/mL   CBC Auto Differential   Result Value Ref Range    WBC 9.10 3.40 - 10.80 10*3/mm3    RBC 4.73 3.77 - 5.28 10*6/mm3    Hemoglobin 15.5 12.0 - 15.9 g/dL    Hematocrit 42.8 34.0 - 46.6 %    MCV 90.5 79.0 - 97.0 fL    MCH 32.8 26.6 - 33.0 pg    MCHC 36.2 (H) 31.5 - 35.7 g/dL    RDW 13.6 12.3 - 15.4 %    RDW-SD 43.3 37.0 - 54.0 fl    MPV 8.0 6.0 - 12.0 fL    Platelets 343 140 - 450 10*3/mm3    Neutrophil % 52.7 42.7 - 76.0 %    Lymphocyte % 35.7 19.6 - 45.3 %    Monocyte % 9.1 5.0 - 12.0 %    Eosinophil % 1.6 0.3 - 6.2 %    Basophil % 0.9 0.0 - 1.5 %    Neutrophils, Absolute 4.80 1.70 - 7.00 10*3/mm3    Lymphocytes, Absolute 3.30 (H) 0.70 - 3.10 10*3/mm3    Monocytes, Absolute 0.80 0.10 - 0.90 10*3/mm3    Eosinophils, Absolute 0.10 0.00 - 0.40 10*3/mm3    Basophils, Absolute 0.10 0.00 - 0.20 10*3/mm3    nRBC 0.1 0.0 - 0.2 /100 WBC   Scan Slide   Result Value  Ref Range    Dacrocytes Slight/1+ None Seen    Poikilocytes Slight/1+ None Seen    WBC Morphology Normal Normal    Platelet Estimate Adequate Normal    Large Platelets Slight/1+ None Seen   Light Blue Top   Result Value Ref Range    Extra Tube hold for add-on    Green Top (Gel)   Result Value Ref Range    Extra Tube Hold for add-ons.    Lavender Top   Result Value Ref Range    Extra Tube done    Gold Top - SST   Result Value Ref Range    Extra Tube Hold for add-ons.      Medications   sodium chloride 0.9 % flush 10 mL (has no administration in time range)   sodium chloride 0.9 % flush 10 mL (has no administration in time range)   nitroglycerin (NITROSTAT) SL tablet 0.4 mg (0.4 mg Sublingual Given 4/21/20 0247)   lisinopril (PRINIVIL,ZESTRIL) tablet 2.5 mg (has no administration in time range)   aspirin tablet 325 mg (325 mg Oral Given 4/21/20 0248)   labetalol (NORMODYNE,TRANDATE) injection 10 mg (10 mg Intravenous Given 4/21/20 0248)   ondansetron (ZOFRAN) injection 4 mg (4 mg Intravenous Given 4/21/20 0307)   nitroglycerin (NITROSTAT) ointment 1 inch (1 inch Topical Given 4/21/20 0311)     No radiology results for the last day    Chest x-ray was unremarkable.  CT sinuses was negative                           MDM  The patient had a sudden onset of chest pain that was relieved with nitroglycerin.  Her EKG shows a sinus tachycardia with left atrial enlargement as well as poor R wave progression across the precordial leads but no evidence of definite acute MI.  Patient's initial troponin as well as her dimer were negative.  She has a slight bump in her ALT and AST although she is on a statin.  The patient remained stable on nitroglycerin in the emergency department and will be admitted for further cardiac evaluation.  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  The patient's hypertension was  treated with p.o. lisinopril as well as IV labetalol and her pressure decreased to 150/90.  Heart rate was 88.  Final diagnoses:   Chest pain in adult   Cough   Essential hypertension            Abraham Castaneda MD  04/21/20 0347      Electronically signed by Abraham Castaneda MD at 04/21/20 0347     Erika Bazan LPN at 04/21/20 0220        Pt reports R side CP that started when she went to bed, neck and nose pain several days ago, and dry cough for a few days.      Erika Bazan LPN  04/21/20 0221    Pt reports hx HTN, states she forgot to take her Lisinopril before bed tonight     Erika Bazan LPN  04/21/20 0225    Pt reports she had a stress test x1 year ago that was normal     Erika Bazan LPN  04/21/20 0225      Electronically signed by Erika Bazan LPN at 04/21/20 0225       Vital Signs (last day) before discharge     Date/Time   Temp   Temp src   Pulse   Resp   BP   Patient Position   SpO2    04/21/20 1401   97.9 (36.6)   Oral   98   16   147/82   --   94    04/21/20 1016   97.7 (36.5)   Oral   87   18   147/83   --   97    04/21/20 0500   97.2 (36.2)   Oral   90   20   137/74   Sitting   --    04/21/20 0341   --   --   91   --   155/90   --   96    04/21/20 0328   --   --   86   --   142/82   --   96    04/21/20 0323   --   --   87   --   153/82   --   96    04/21/20 0318   --   --   85   --   149/88   --   96    04/21/20 03:17:02   --   --   84   18   143/76   --   96    04/21/20 0313   --   --   89   --   143/76   --   96    04/21/20 0308   --   --   87   --   150/92   --   95    04/21/20 0303   --   --   87   --   147/90   --   94    04/21/20 0253   --   --   (!) 129   --   156/81   --   95    04/21/20 0243   --   --   100   --   161/71   --   96    04/21/20 0238   --   --   94   --   159/94   --   97    04/21/20 0208   98 (36.7)   Oral   116   18   (!) 179/117   Sitting   97                Facility-Administered Medications as of 4/21/2020   Medication Dose Route Frequency  Provider Last Rate Last Dose   • [COMPLETED] aspirin tablet 325 mg  325 mg Oral Once Abraham Castaneda MD   325 mg at 04/21/20 0248   • [COMPLETED] labetalol (NORMODYNE,TRANDATE) injection 10 mg  10 mg Intravenous Once Abraham Castaneda MD   10 mg at 04/21/20 0248   • [COMPLETED] lisinopril (PRINIVIL,ZESTRIL) tablet 2.5 mg  2.5 mg Oral Q24H Abraham Castaneda MD   2.5 mg at 04/21/20 1021   • [COMPLETED] nitroglycerin (NITROSTAT) ointment 1 inch  1 inch Topical Once Abraham Castaneda MD   1 inch at 04/21/20 0311   • [COMPLETED] ondansetron (ZOFRAN) injection 4 mg  4 mg Intravenous Once Abraham Castaneda MD   4 mg at 04/21/20 0307     Discharge Summary    No notes of this type exist for this encounter.

## 2020-04-22 NOTE — OUTREACH NOTE
Medical Week 1 Survey      Responses   [facility] patient discharged from?  [facility]   COVID-19 Test Status  Negative   Does the patient have one of the following disease processes/diagnoses(primary or secondary)?  Other   Is there a successful TCM telephone encounter documented?  No   Week 1 attempt successful?  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    Discharge diagnosis  Chest pain in adult   Is patient permission given to speak with other caregiver?  Yes   List who call center can speak with     Meds reviewed with patient/caregiver?  Yes   Is the patient having any side effects they believe may be caused by any medication additions or changes?  No   Does the patient have all medications ordered at discharge?  Yes   Is the patient taking all medications as directed (includes completed medication regime)?  Yes   Medication comments  Pt did not go home with NTG but she is concerned since her pain responded to NTG and was completely resolved after 1 dose of NTG. Pt is now taking 81mg ASA daily per Cardiology   Does the patient have a primary care provider?   Yes   Does the patient have an appointment with their PCP within 7 days of discharge?  Yes   Has the patient kept scheduled appointments due by today?  Yes   Comments  Pt has spoken via phone.    Psychosocial issues?  No   Comments  Pt reports no more chest pain. Monitoring her bp at home, she got new machine but has not taken it today. Pt remains afebrile. Pt reports tight chest feeling is resolved.    Did the patient receive a copy of their discharge instructions?  Yes   Nursing interventions  Reviewed instructions with patient   What is the patient's perception of their health status since discharge?  Improving   Is the patient/caregiver able to teach back signs and symptoms related to disease process for when to call PCP?  Yes   Is the patient/caregiver able to  teach back signs and symptoms related to disease process for when to call 911?  Yes   Is the patient/caregiver able to teach back the hierarchy of who to call/visit for symptoms/problems? PCP, Specialist, Home health nurse, Urgent Care, ED, 911  Yes   Week 1 call completed?  Yes          Dalia De Oliveira RN

## 2020-04-22 NOTE — PLAN OF CARE
Problem: Cardiac: ACS (Acute Coronary Syndrome) (Adult)  Goal: Signs and Symptoms of Listed Potential Problems Will be Absent, Minimized or Managed (Cardiac: ACS)  Description  Signs and symptoms of listed potential problems will be absent, minimized or managed by discharge/transition of care (reference Cardiac: ACS (Acute Coronary Syndrome) (Adult) CPG).  Outcome: Outcome(s) achieved     Problem: Patient Care Overview  Goal: Plan of Care Review  Outcome: Outcome(s) achieved  Goal: Individualization and Mutuality  Outcome: Outcome(s) achieved  Goal: Discharge Needs Assessment  Outcome: Outcome(s) achieved  Goal: Interprofessional Rounds/Family Conf  Outcome: Outcome(s) achieved

## 2020-04-22 NOTE — DISCHARGE SUMMARY
[facility] Medicine Services  DISCHARGE SUMMARY        Prepared For PCP:  Lorena Metzger MD    Patient Name: Vibha Darling  : 1970  MRN: [id number]      Date of Admission:   2020    Date of Discharge:  2020    Length of stay:  LOS: 0 days     Hospital Course     Presenting Problem:   Cough [R05]  Essential hypertension [I10]  Chest pain in adult [R07.9]      Active Hospital Problems    Diagnosis  POA   • Mixed hyperlipidemia [E78.2]  Yes   • Elevated LFTs [R94.5]  Yes   • Overweight (BMI 25.0-29.9) [E66.3]  Yes   • Generalized anxiety disorder [F41.1]  Yes   • Essential hypertension [I10]  Yes      Resolved Hospital Problems    Diagnosis Date Resolved POA   • **Chest pain in adult [R07.9] 2020 Yes   • Hypertensive urgency [I16.0] 2020 Yes   • Cough [R05] 2020 Yes   • Tachycardia [R00.0] 2020 Yes           Hospital Course:  Vibha Darling is a 49 y.o. female with a history of hypertension and hyperlipidemia who presents to [facility] ED on 2020 complaining of right-sided chest pain. The patient states she developed a dry, nonproductive cough approximately 10 days ago. She states her PCP evaluated her and thought she had postnasal drip. She states she then developed some right-sided face and neck pain, as well as congestion and thought she had sinusitis. She states 3 days ago she started with generalized body aches. She states her skin hurt to touch. She also reports nausea, but denies vomiting or diarrhea. She denies fever or chills. She states her symptoms started to improve 2 days ago, but then last night she developed severe right-sided chest pain. She describes it as a belt wrapped around her right chest. She also describes it as a heaviness and tightness. She also reports right shoulder pressure, but states that is chronic and intermittent. She denies any shortness of breath, but reports some recent exercise intolerance.  "She reports increased anxiety for the past 5 weeks since the coronavirus pandemic. She states she has lost 9 lbs from her anxiety and lack of appetite during that time.      The patient has never smoked. She denies alcohol or illicit drug use. 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 She denies a history of coronary artery disease or diabetes. She states she had a normal stress test done approximately 1.5 years ago. She reports a family history of heart disease in her father and his 3 siblings. She states her aunt and uncles  during their sleep at an early age.      Upon arrival to the ER the patient was hypertensive (179/117) and tachycardic (116). She was given 1 sublingual nitroglycerin, aspirin 325 mg PO, labetalol 10 mg IV, Zofran 4 mg IV, and 1\" nitro paste was applied. Her troponin, proBNP, and dimer were normal. Her EKG showed sinus tachycardia with probably left atrial enlargement, which is unchanged from 11/15/2019 EKG. Her CBC and CMP were unremarkable except AST 65 and . Her procalcitonin was low at 0.07, her LDH was elevated at 261, and her ferritin and CRP were within normal limits. Her respiratory virus panel was negative. Her CXR showed no acute findings. Her CT facial bones showed no acute findings. COVID-19 test was negative.    Serial troponin were negative; ruling out ACS.  Respiratory viral panel was negative.  She was started on low dose metoprolol BID.  She was discharged home in good condition with instructions to monitor her BP with a home arm cuff and document the readings for her PCP.  She was instructed to eliminate soda from her diet.  She is to f/u with her PCP for re-evaluation of her LFTs, triglycerides and blood glucose to r/o DM type2.  The patient will also have an outpatient stress testing. (During the time of this pandemic, inpatient testing is being limited, and outpatient testing is encouraged when possible.)  A referral was placed at discharge for " the patient to be seen by cardiology in a few weeks for the outpatient stress test.      Recommendation for Outpatient Providers:             Reasons For Change In Medications and Indications for New Medications:        Day of Discharge     HPI: No further chest pain at this time.      Vital Signs:   Temp:  [97.2 °F (36.2 °C)-98 °F (36.7 °C)] 97.9 °F (36.6 °C)  Heart Rate:  [] 98  Resp:  [16-20] 16  BP: (137-179)/() 147/82     Physical Exam:  General: well-developed and well-nourished, NAD  HEENT: NC/AT, EOMI, PERRLA  Heart: RRR. No murmur   Chest: CTAB, no w/r/r, normal respiratory effort  Abdominal: Soft. NT/ND. Bowel sounds present  Musculoskeletal: Normal ROM.  No edema. No calf tenderness.  Neurological: AAOx3, no focal deficits  Skin: Skin is warm and dry. No rash  Psychiatric: Normal mood and affect.    Pertinent  and/or Most Recent Results     Results from last 7 days   Lab Units 04/21/20  0619 04/21/20  0232   WBC 10*3/mm3 8.30 9.10   HEMOGLOBIN g/dL 14.7 15.5   HEMATOCRIT % 40.3 42.8   PLATELETS 10*3/mm3 294 343   SODIUM mmol/L 139 140   POTASSIUM mmol/L 4.1 3.9   CHLORIDE mmol/L 101 100   CO2 mmol/L 23.0 24.0   BUN mg/dL 12 11   CREATININE mg/dL 0.99 0.97   GLUCOSE mg/dL 113* 118*   CALCIUM mg/dL 9.7 9.8     Results from last 7 days   Lab Units 04/21/20  0619 04/21/20  0232   BILIRUBIN mg/dL <0.2* 0.2   ALK PHOS U/L 79 85   ALT (SGPT) U/L 114* 105*   AST (SGOT) U/L 81* 65*     Results from last 7 days   Lab Units 04/21/20  0232   CHOLESTEROL mg/dL 258*   TRIGLYCERIDES mg/dL 400*   HDL CHOL mg/dL 56     Results from last 7 days   Lab Units 04/21/20  1003 04/21/20  0619 04/21/20  0232   TSH uIU/mL  --   --  3.900   PROBNP pg/mL  --   --  <5.0*   TROPONIN T ng/mL <0.010 <0.010 <0.010   PROCALCITONIN ng/mL  --   --  0.07*       Brief Urine Lab Results  (Last result in the past 365 days)      Color   Clarity   Blood   Leuk Est   Nitrite   Protein   CREAT   Urine HCG        01/02/20 1516 Yellow  Clear 2+ Negative Positive Negative               Microbiology Results Abnormal     Procedure Component Value - Date/Time    SARS-CoV-2 PCR ([place] IN-HOUSE PERFORMED), NP SWAB IN TRANSPORT MEDIA - Swab, Nasopharynx [[id number]]  (Normal) Collected:  04/21/20 0245    Lab Status:  Final result Specimen:  Swab from Nasopharynx Updated:  04/21/20 1213     COVID19 Not Detected    Respiratory Panel, PCR - Swab, Nasopharynx [[id number]]  (Normal) Collected:  04/21/20 0245    Lab Status:  Final result Specimen:  Swab from Nasopharynx Updated:  04/21/20 0414     ADENOVIRUS, PCR Not Detected     Coronavirus 229E Not Detected     Coronavirus HKU1 Not Detected     Coronavirus NL63 Not Detected     Coronavirus OC43 Not Detected     Human Metapneumovirus Not Detected     Human Rhinovirus/Enterovirus Not Detected     Influenza B PCR Not Detected     Parainfluenza Virus 1 Not Detected     Parainfluenza Virus 2 Not Detected     Parainfluenza Virus 3 Not Detected     Parainfluenza Virus 4 Not Detected     Bordetella pertussis pcr Not Detected     Influenza A H1 2009 PCR Not Detected     Chlamydophila pneumoniae PCR Not Detected     Mycoplasma pneumo by PCR Not Detected     Influenza A PCR Not Detected     Influenza A H3 Not Detected     Influenza A H1 Not Detected     RSV, PCR Not Detected    Narrative:       The coronavirus on the RVP is NOT COVID-19 and is NOT indicative of infection with COVID-19.           Xr Chest 1 View    Result Date: 4/21/2020  Impression: No radiographic findings of acute cardiopulmonary abnormality.  Electronically Signed By-DR. Rashel Pena MD On:4/21/2020 7:18 AM This report was finalized on [id number] by DR. Rashel Pena MD.    Ct Facial Bones Without Contrast    Result Date: 4/21/2020  Impression: Unremarkable maxillofacial CT. Electronically signed by:  Myke Alonzo M.D.  4/21/2020 1:59 AM                             Test Results Pending at Discharge        Procedures Performed                  Consults:   Consults     Date and Time Order Name Status Description    4/21/2020 0343 Hospitalist (on-call MD unless specified) Completed             Discharge Details        Discharge Medications      New Medications      Instructions Start Date   losartan 25 MG tablet  Commonly known as:  COZAAR   12.5 mg, Oral, Daily      metoprolol succinate XL 25 MG 24 hr tablet  Commonly known as:  TOPROL-XL   25 mg, Oral, Daily         Continue These Medications      Instructions Start Date   Prevalite 4 GM/DOSE powder  Generic drug:  cholestyramine light   4 g, Oral, Daily         Stop These Medications    lisinopril 2.5 MG tablet  Commonly known as:  PRINIVIL,ZESTRIL            Allergies   Allergen Reactions   • Doxycycline Other (See Comments) and Rash     Flushing     • Dye Fdc Red [Red Dye] Anaphylaxis   • Levofloxacin Other (See Comments) and Rash     Flushing     • Ciprofloxacin Other (See Comments)     Flushing    • Contrast Dye Itching         Discharge Disposition:  Home or Self Care    Diet:  Hospital:  Diet Order   Procedures   • Diet Regular         Discharge Activity:   Activity Instructions     Activity as Tolerated              CODE STATUS:    Code Status and Medical Interventions:   Ordered at: 04/21/20 2499     Level Of Support Discussed With:    Patient     Code Status:    CPR     Medical Interventions (Level of Support Prior to Arrest):    Full         Follow-up Appointments  No future appointments.    Additional Instructions for the Follow-ups that You Need to Schedule     Call MD With Problems / Concerns   As directed      Instructions: Call [phone] or email [email or web address] for problems or concerns.    Order Comments:  Instructions: Call [phone] or email [email or web address] for problems or concerns.          Discharge Follow-up with PCP   As directed       Currently Documented PCP:    Lorena Metzger MD    PCP Phone Number:    [phone]     Follow Up Details:  1-2  weeks         Discharge Follow-up with Specialty: Cardiology - [facility] - Dr. Gu/Marina Schwab; 2 Weeks   As directed      Specialty:  [facility] - Dr. Gu/Marina Schwab    Follow Up:  2 Weeks    Follow Up Details:  outpatient stress test                 Condition on Discharge:      Stable      This patient has been examined wearing appropriate Personal Protective Equipment. 04/21/20      Electronically signed by Elsie Byrd MD, 04/21/20, 8:09 PM.      Time: I spent  25  minutes on this discharge activity which included face-to-face encounter with the patient/reviewing the data in the system/coordination of the care with the nursing staff as well as consultants/documentation/entering orders.

## 2020-04-22 NOTE — OUTREACH NOTE
Prep Survey      Responses   [facility]  [facility]   Is LACE score < 7 ?  No   Eligibility  Readm Mgmt   Discharge diagnosis  Chest pain in adult   COVID-19 Test Status  Negative   Does the patient have one of the following disease processes/diagnoses(primary or secondary)?  Other   Does the patient have Home health ordered?  No   Is there a DME ordered?  No   Prep survey completed?  Yes          Jennifer Skaggs RN

## 2020-04-22 NOTE — PROGRESS NOTES
"Cardiology Office Consultation      Encounter Date:  04/22/2020    Patient ID:   Vibha Darling is a 49 y.o. female.    You have chosen to receive care through a telephone visit. Do you consent to use a telephone visit for your medical care today? Yes    This visit has been rescheduled as a phone visit to comply with patient safety concerns in accordance with CDC recommendations. Total time of discussion was 20 minutes.      Reason For Consultation:  Chest pain      History of Present Illness:  Dear Lorena Catalan MD  It was my pleasure to evaluate Vibha via phone visit today.  As you are aware, she is a very pleasant 49-year-old  female with no previous history of ischemic heart disease.  Patient does have risk factors that include hypertension, dyslipidemia, heart disease in distant family members.    The patient reports her symptoms actually began approximately 10 days ago with sore throat, ear pain and cough.  She was treating these with over-the-counter preparations.  5 days ago she awoke with significant body aches that persisted x2 days.  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  She then became nauseated which lasted for approximately 24 hours.  2 days ago she awoke and actually felt better.  However, as the day progressed she began having a squeezing/tightness sensation in her right arm and right chest that was severe in intensity.  She then began feeling as if she could not take a deep breath.  She presented to the emergency department.  In the ER, she was hypertensive (179/117) and tachycardic (116). She was given 1 sublingual nitroglycerin, aspirin 325 mg PO, labetalol 10 mg IV, Zofran 4 mg IV, and 1\" nitro paste was applied.  She had complete resolution of her symptoms.  Troponin, proBNP, and dimer were normal.  EKG shows sinus rhythm with nonspecific IVCD and approximately 0.5 mm ST elevation V1 only.  She " was evaluated on the floor and eventually discharged home for outpatient cardiology consultation.  Patient stated that due to her upper respiratory symptoms, she has been using Afrin nasal spray.  She had discontinued her PPI, but restarted 2 and half weeks ago.  She has history of esophageal dilatation in the past.        Assessment & Plan    Impressions:  Chest pain with typical and atypical features  Abnormal EKG  History of esophageal stricture  History of hypertension  Dyslipidemia-hypertriglyceridemia    Recommendations:  Continue current antihypertensives  Continue PPI  EKG 4/21/2020 is unchanged from prior  We will schedule patient for nuclear stress testing to further evaluate for inducible ischemia    Objective:    Vitals:  There were no vitals filed for this visit.    Review of Systems   HENT: Positive for ear pain.    Cardiovascular: Positive for chest pain.   Respiratory: Positive for cough and shortness of breath.    Gastrointestinal: Positive for heartburn.   All other systems reviewed and are negative.      Physical Exam:     No physical exam was completed as this was a phone visit    History of Present Illness      Allergies:  Allergies   Allergen Reactions   • Doxycycline Other (See Comments) and Rash     Flushing     • Dye Fdc Red [Red Dye] Anaphylaxis   • Levofloxacin Other (See Comments) and Rash     Flushing     • Ciprofloxacin Other (See Comments)     Flushing    • Contrast Dye Itching       Medication Review:     Current Outpatient Medications:   •  losartan (COZAAR) 25 MG tablet, Take 0.5 tablets by mouth Daily., Disp: 15 tablet, Rfl: 2  •  metoprolol succinate XL (TOPROL-XL) 25 MG 24 hr tablet, Take 1 tablet by mouth Daily., Disp: 30 tablet, Rfl: 2  •  PREVALITE 4 GM/DOSE powder, TAKE 1 PACKET BY MOUTH DAILY, Disp: 231 g, Rfl: 2  No current facility-administered medications for this visit.     Family History:  Family History   Problem Relation Age of Onset   • Heart disease Father    •  "Heart disease Paternal Aunt    • Heart disease Paternal Uncle    • Thrombophilia Paternal Uncle    • Heart disease Paternal Uncle    • Alcohol abuse Paternal Uncle        Past Medical History:  Past Medical History:   Diagnosis Date   • Hyperlipidemia    • Hypertension    • Overweight (BMI 25.0-29.9) 4/21/2020       Past surgical History:  Past Surgical History:   Procedure Laterality Date   • CHOLECYSTECTOMY     • COLONOSCOPY     • DILATATION AND CURETTAGE     • ECTOPIC PREGNANCY SURGERY         Social History:  Social History     Socioeconomic History   • Marital status:      Spouse name: Not on file   • Number of children: Not on file   • Years of education: Not on file   • Highest education level: Not on file   Tobacco Use   • Smoking status: Never Smoker   • Smokeless tobacco: Never Used   Substance and Sexual Activity   • Alcohol use: No     Frequency: Never   • Drug use: Never   • Sexual activity: Defer       Review of Systems:  The following systems were reviewed as they relate to the cardiovascular system: Constitutional, Eyes, ENT, Cardiovascular, Respiratory, Gastrointestinal, Integumentary, Neurological, Psychiatric, Hematologic, Endocrine, Musculoskeletal, and Genitourinary. The pertinent cardiovascular findings are reported above with all other cardiovascular points within those systems being negative.    Diagnostic Study Review:     Current Electrocardiogram:  Procedures    Most recent Cardiac Catheterization:  Date:  Findings:    Most Recent Echocardiogram:  Date:  Findings:    Most Recent Stress Test:  Date:  Findings:    Most Recent Ambulatory ECG Monitor:  Date:  Findings:    NOTE: The following portions of the patient's history were reviewed and updated this visit as appropriate: allergies, current medications, past family history, past medical history, past social history, past surgical history and problem list.    EMR Dragon/Transcription:   \"Dictated utilizing Dragon dictation\".   "

## 2020-04-23 ENCOUNTER — READMISSION MANAGEMENT (OUTPATIENT)
Dept: CALL CENTER | Facility: HOSPITAL | Age: 50
End: 2020-04-23

## 2020-04-23 NOTE — OUTREACH NOTE
Medical Week 1 Survey      Responses   [facility] patient discharged from?  [facility]   COVID-19 Test Status  Negative   Does the patient have one of the following disease processes/diagnoses(primary or secondary)?  Other   Is there a successful TCM telephone encounter documented?  No   Week 1 attempt successful?  No   Unsuccessful attempts  Attempt 1          Bisi Yadav RN

## 2020-04-24 ENCOUNTER — READMISSION MANAGEMENT (OUTPATIENT)
Dept: CALL CENTER | Facility: HOSPITAL | Age: 50
End: 2020-04-24

## 2020-04-24 NOTE — OUTREACH NOTE
Medical Week 1 Survey      Responses   [facility] patient discharged from?  [facility]   COVID-19 Test Status  Negative   Does the patient have one of the following disease processes/diagnoses(primary or secondary)?  Other   Is there a successful TCM telephone encounter documented?  No   Week 1 attempt successful?  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

## 2020-04-25 ENCOUNTER — DOCUMENTATION (OUTPATIENT)
Dept: FAMILY MEDICINE CLINIC | Facility: CLINIC | Age: 50
End: 2020-04-25

## 2020-04-25 RX ORDER — CEPHALEXIN 500 MG
500 CAPSULE ORAL 2 TIMES DAILY
Qty: 14 CAPSULE | Refills: 0 | Status: SHIPPED | OUTPATIENT
Start: 2020-04-25 | End: 2020-05-02

## 2020-04-29 ENCOUNTER — LAB (OUTPATIENT)
Dept: FAMILY MEDICINE CLINIC | Facility: CLINIC | Age: 50
End: 2020-04-29

## 2020-04-29 DIAGNOSIS — R79.89 ELEVATED LFTS: ICD-10-CM

## 2020-04-29 DIAGNOSIS — I15.9 SECONDARY HYPERTENSION: Primary | ICD-10-CM

## 2020-04-29 DIAGNOSIS — E78.2 MIXED HYPERLIPIDEMIA: ICD-10-CM

## 2020-04-29 DIAGNOSIS — E78.2 MIXED HYPERLIPIDEMIA: Chronic | ICD-10-CM

## 2020-04-29 DIAGNOSIS — R30.0 DYSURIA: ICD-10-CM

## 2020-04-29 DIAGNOSIS — I15.9 SECONDARY HYPERTENSION: ICD-10-CM

## 2020-04-29 LAB
ALBUMIN SERPL-MCNC: 4.6 G/DL (ref 3.5–5.2)
ALBUMIN/GLOB SERPL: 1.2 G/DL
ALP SERPL-CCNC: 79 U/L (ref 39–117)
ALT SERPL W P-5'-P-CCNC: 75 U/L (ref 1–33)
ANION GAP SERPL CALCULATED.3IONS-SCNC: 14 MMOL/L (ref 5–15)
AST SERPL-CCNC: 36 U/L (ref 1–32)
BILIRUB SERPL-MCNC: 0.2 MG/DL (ref 0.2–1.2)
BUN BLD-MCNC: 9 MG/DL (ref 6–20)
BUN/CREAT SERPL: 10.5 (ref 7–25)
CALCIUM SPEC-SCNC: 10.2 MG/DL (ref 8.6–10.5)
CHLORIDE SERPL-SCNC: 104 MMOL/L (ref 98–107)
CHOLEST SERPL-MCNC: 248 MG/DL (ref 0–200)
CO2 SERPL-SCNC: 24 MMOL/L (ref 22–29)
CREAT BLD-MCNC: 0.86 MG/DL (ref 0.57–1)
CRP SERPL-MCNC: 0.5 MG/DL (ref 0–0.5)
GFR SERPL CREATININE-BSD FRML MDRD: 70 ML/MIN/1.73
GLOBULIN UR ELPH-MCNC: 3.7 GM/DL
GLUCOSE BLD-MCNC: 105 MG/DL (ref 65–99)
HDLC SERPL-MCNC: 50 MG/DL (ref 40–60)
LDLC SERPL CALC-MCNC: 133 MG/DL (ref 0–100)
LDLC/HDLC SERPL: 2.66 {RATIO}
POTASSIUM BLD-SCNC: 4.3 MMOL/L (ref 3.5–5.2)
PROT SERPL-MCNC: 8.3 G/DL (ref 6–8.5)
SODIUM BLD-SCNC: 142 MMOL/L (ref 136–145)
TRIGL SERPL-MCNC: 325 MG/DL (ref 0–150)
URATE SERPL-MCNC: 5.8 MG/DL (ref 2.4–5.7)
VLDLC SERPL-MCNC: 65 MG/DL

## 2020-04-29 PROCEDURE — 85007 BL SMEAR W/DIFF WBC COUNT: CPT | Performed by: FAMILY MEDICINE

## 2020-04-29 PROCEDURE — 86140 C-REACTIVE PROTEIN: CPT | Performed by: FAMILY MEDICINE

## 2020-04-29 PROCEDURE — 85027 COMPLETE CBC AUTOMATED: CPT | Performed by: FAMILY MEDICINE

## 2020-04-29 PROCEDURE — 80053 COMPREHEN METABOLIC PANEL: CPT | Performed by: FAMILY MEDICINE

## 2020-04-29 PROCEDURE — 84436 ASSAY OF TOTAL THYROXINE: CPT | Performed by: FAMILY MEDICINE

## 2020-04-29 PROCEDURE — 86038 ANTINUCLEAR ANTIBODIES: CPT | Performed by: FAMILY MEDICINE

## 2020-04-29 PROCEDURE — 36415 COLL VENOUS BLD VENIPUNCTURE: CPT

## 2020-04-29 PROCEDURE — 83036 HEMOGLOBIN GLYCOSYLATED A1C: CPT | Performed by: FAMILY MEDICINE

## 2020-04-29 PROCEDURE — 82306 VITAMIN D 25 HYDROXY: CPT | Performed by: FAMILY MEDICINE

## 2020-04-29 PROCEDURE — 80061 LIPID PANEL: CPT | Performed by: FAMILY MEDICINE

## 2020-04-29 PROCEDURE — 82607 VITAMIN B-12: CPT | Performed by: FAMILY MEDICINE

## 2020-04-29 PROCEDURE — 86431 RHEUMATOID FACTOR QUANT: CPT | Performed by: FAMILY MEDICINE

## 2020-04-29 PROCEDURE — 84550 ASSAY OF BLOOD/URIC ACID: CPT | Performed by: FAMILY MEDICINE

## 2020-04-29 PROCEDURE — 84443 ASSAY THYROID STIM HORMONE: CPT | Performed by: FAMILY MEDICINE

## 2020-04-30 LAB
25(OH)D3 SERPL-MCNC: 44.7 NG/ML (ref 30–100)
CHROMATIN AB SERPL-ACNC: 11 IU/ML (ref 0–14)
DEPRECATED RDW RBC AUTO: 42.1 FL (ref 37–54)
EOSINOPHIL # BLD MANUAL: 0.22 10*3/MM3 (ref 0–0.4)
EOSINOPHIL NFR BLD MANUAL: 3.1 % (ref 0.3–6.2)
ERYTHROCYTE [DISTWIDTH] IN BLOOD BY AUTOMATED COUNT: 12.8 % (ref 12.3–15.4)
HBA1C MFR BLD: 5.5 % (ref 3.5–5.6)
HCT VFR BLD AUTO: 42.7 % (ref 34–46.6)
HGB BLD-MCNC: 14.8 G/DL (ref 12–15.9)
LYMPHOCYTES # BLD MANUAL: 2.68 10*3/MM3 (ref 0.7–3.1)
LYMPHOCYTES NFR BLD MANUAL: 37.5 % (ref 19.6–45.3)
LYMPHOCYTES NFR BLD MANUAL: 9.4 % (ref 5–12)
MCH RBC QN AUTO: 31.7 PG (ref 26.6–33)
MCHC RBC AUTO-ENTMCNC: 34.7 G/DL (ref 31.5–35.7)
MCV RBC AUTO: 91.4 FL (ref 79–97)
MONOCYTES # BLD AUTO: 0.67 10*3/MM3 (ref 0.1–0.9)
NEUTROPHILS # BLD AUTO: 3.57 10*3/MM3 (ref 1.7–7)
NEUTROPHILS NFR BLD MANUAL: 50 % (ref 42.7–76)
PLAT MORPH BLD: NORMAL
PLATELET # BLD AUTO: 339 10*3/MM3 (ref 140–450)
PMV BLD AUTO: 10.9 FL (ref 6–12)
RBC # BLD AUTO: 4.67 10*6/MM3 (ref 3.77–5.28)
RBC MORPH BLD: NORMAL
T4 SERPL-MCNC: 14.6 MCG/DL (ref 4.5–11.7)
TSH SERPL DL<=0.05 MIU/L-ACNC: 2.11 UIU/ML (ref 0.27–4.2)
VIT B12 BLD-MCNC: 297 PG/ML (ref 211–946)
WBC MORPH BLD: NORMAL
WBC NRBC COR # BLD: 7.14 10*3/MM3 (ref 3.4–10.8)

## 2020-05-01 ENCOUNTER — EPISODE CHANGES (OUTPATIENT)
Dept: CASE MANAGEMENT | Facility: OTHER | Age: 50
End: 2020-05-01

## 2020-05-01 LAB — ANA SER QL: NEGATIVE

## 2020-05-11 RX ORDER — CHOLESTYRAMINE 4 G/9G
POWDER, FOR SUSPENSION ORAL
Qty: 378 G | Refills: 1 | Status: SHIPPED | OUTPATIENT
Start: 2020-05-11 | End: 2020-05-28 | Stop reason: SDUPTHER

## 2020-05-20 RX ORDER — NEOMYCIN SULFATE, POLYMYXIN B SULFATE, HYDROCORTISONE 3.5; 10000; 1 MG/ML; [USP'U]/ML; MG/ML
3 SOLUTION/ DROPS AURICULAR (OTIC) 4 TIMES DAILY
Qty: 10 ML | Refills: 0 | OUTPATIENT
Start: 2020-05-20

## 2020-05-20 RX ORDER — CEPHALEXIN 500 MG/1
CAPSULE ORAL
Qty: 14 CAPSULE | Refills: 0 | OUTPATIENT
Start: 2020-05-20

## 2020-05-28 RX ORDER — CHOLESTYRAMINE 4 G/9G
POWDER, FOR SUSPENSION ORAL
Qty: 378 G | Refills: 0 | Status: SHIPPED | OUTPATIENT
Start: 2020-05-28 | End: 2020-07-27

## 2020-05-28 NOTE — TELEPHONE ENCOUNTER
Date of last refill: 5-    Is there an Inspect on file: n/a    Last appt date: 6-     Next appt date: NONE       Additional information:

## 2020-06-10 ENCOUNTER — OFFICE VISIT (OUTPATIENT)
Dept: FAMILY MEDICINE CLINIC | Facility: CLINIC | Age: 50
End: 2020-06-10

## 2020-06-10 VITALS
BODY MASS INDEX: 29.09 KG/M2 | SYSTOLIC BLOOD PRESSURE: 130 MMHG | OXYGEN SATURATION: 99 % | RESPIRATION RATE: 16 BRPM | DIASTOLIC BLOOD PRESSURE: 84 MMHG | TEMPERATURE: 97.9 F | HEIGHT: 66 IN | HEART RATE: 74 BPM

## 2020-06-10 DIAGNOSIS — M79.89 LEFT AXILLARY SWELLING: ICD-10-CM

## 2020-06-10 DIAGNOSIS — Z79.899 MEDICATION MANAGEMENT: ICD-10-CM

## 2020-06-10 DIAGNOSIS — M25.50 ARTHRALGIA, UNSPECIFIED JOINT: ICD-10-CM

## 2020-06-10 DIAGNOSIS — Z12.39 BREAST CANCER SCREENING: ICD-10-CM

## 2020-06-10 DIAGNOSIS — F41.9 ANXIETY: ICD-10-CM

## 2020-06-10 DIAGNOSIS — R00.0 TACHYCARDIA: ICD-10-CM

## 2020-06-10 DIAGNOSIS — M79.10 MYALGIA: ICD-10-CM

## 2020-06-10 DIAGNOSIS — C43.9 MALIGNANT MELANOMA, UNSPECIFIED SITE (HCC): Primary | ICD-10-CM

## 2020-06-10 DIAGNOSIS — C43.59 MALIGNANT MELANOMA OF BACK (HCC): Primary | ICD-10-CM

## 2020-06-10 DIAGNOSIS — R53.83 OTHER FATIGUE: ICD-10-CM

## 2020-06-10 PROCEDURE — 84439 ASSAY OF FREE THYROXINE: CPT | Performed by: FAMILY MEDICINE

## 2020-06-10 PROCEDURE — 86140 C-REACTIVE PROTEIN: CPT | Performed by: FAMILY MEDICINE

## 2020-06-10 PROCEDURE — 99214 OFFICE O/P EST MOD 30 MIN: CPT | Performed by: FAMILY MEDICINE

## 2020-06-10 PROCEDURE — 80053 COMPREHEN METABOLIC PANEL: CPT | Performed by: FAMILY MEDICINE

## 2020-06-10 PROCEDURE — 82550 ASSAY OF CK (CPK): CPT | Performed by: FAMILY MEDICINE

## 2020-06-10 PROCEDURE — 84443 ASSAY THYROID STIM HORMONE: CPT | Performed by: FAMILY MEDICINE

## 2020-06-10 RX ORDER — NEBIVOLOL 2.5 MG/1
2.5 TABLET ORAL DAILY
Qty: 30 TABLET | Refills: 2 | Status: SHIPPED | OUTPATIENT
Start: 2020-06-10 | End: 2020-06-25 | Stop reason: SDUPTHER

## 2020-06-10 NOTE — PROGRESS NOTES
Subjective   Vibha Darling is a 49 y.o. female.     Chief Complaint   Patient presents with   • Follow-up   • Cancer     malignant melanoma-new diagnoses       History of Present Illness   Pt recently daignoses with malignant biopsy.  She has had a change in the past year in an existing mole in her back and had biopsy done last week in [place]. ( records not available.)  Jake ws informed of test results yesterday and will be referred to a plastic surgeon  for excision and sentinel node biopsy  Pt is anxious about diagnosis, has many questions, asking for referral to oncology for information and second opinion  Pt concerned about left axillary swelling with no palpable node, needs mammogram    The following portions of the patient's history were reviewed and updated as appropriate: allergies, current medications, past family history, past medical history, past social history, past surgical history and problem list.    Past Medical History:   Diagnosis Date   • Anxiety    • Dumping syndrome    • Hyperlipidemia    • Hypertension    • Malignant melanoma (CMS/HCC) 06/2020   • Overweight (BMI 25.0-29.9) 4/21/2020       Past Surgical History:   Procedure Laterality Date   • CHOLECYSTECTOMY     • COLONOSCOPY     • DILATATION AND CURETTAGE     • ECTOPIC PREGNANCY SURGERY         Family History   Problem Relation Age of Onset   • Heart disease Father    • Heart disease Paternal Aunt    • Heart disease Paternal Uncle    • Thrombophilia Paternal Uncle    • Heart disease Paternal Uncle    • Alcohol abuse Paternal Uncle        Review of Systems   Constitutional: Positive for fatigue (sine taaking toprol) and unexpected weight gain. Negative for fever.   Respiratory: Negative for cough and shortness of breath.    Cardiovascular: Positive for palpitations.   Gastrointestinal: Positive for diarrhea (hx dumping syndrome).   Musculoskeletal: Positive for arthralgias.   Skin: Positive for skin lesions (right med back with  healing biopsy wound).   Psychiatric/Behavioral: Positive for stress. The patient is nervous/anxious.        Objective   Physical Exam   Constitutional: She is oriented to person, place, and time. She appears well-developed and well-nourished. No distress.   HENT:   Head: Normocephalic and atraumatic.   Right Ear: External ear normal.   Left Ear: External ear normal.   Nose: Nose normal.   Mouth/Throat: Oropharynx is clear and moist.   Eyes: Pupils are equal, round, and reactive to light. EOM are normal.   Neck: Normal range of motion. Neck supple. No thyromegaly present.   Cardiovascular: Normal rate, regular rhythm and normal heart sounds. Exam reveals no gallop and no friction rub.   No murmur heard.  Pulmonary/Chest: Effort normal. She has no wheezes.   Abdominal: Soft. There is no tenderness.   Musculoskeletal: Normal range of motion. She exhibits no edema, tenderness or deformity.   Lymphadenopathy:     She has no cervical adenopathy.   Neurological: She is alert and oriented to person, place, and time. No cranial nerve deficit.   Skin: Skin is warm and dry. No rash noted. She is not diaphoretic.   Left axillary swelling, more pronounced compared to the right, no palpable nodes  Healing biopsy woung in left med back, no eryhtmea   Psychiatric: She has a normal mood and affect. Her behavior is normal. Judgment and thought content normal.   anxious   Nursing note and vitals reviewed.    Vitals:    06/10/20 1350   BP: 130/84   Pulse: 74   Resp: 16   Temp: 97.9 °F (36.6 °C)   SpO2: 99%     Body mass index is 29.09 kg/m².    Hemoglobin A1C   Date Value Ref Range Status   04/29/2020 5.5 3.5 - 5.6 % Final     Glucose   Date Value Ref Range Status   11/15/2019 91 70 - 105 mg/dL Final     Comment:     Serial Number: [id number]:  [id number]     LDL Cholesterol    Date Value Ref Range Status   04/29/2020 133 (H) 0 - 100 mg/dL Final   04/21/2020 122 (H) 0 - 100 mg/dL Final     TSH   Date Value Ref Range Status    04/29/2020 2.110 0.270 - 4.200 uIU/mL Final     Comment:     TSH results may be falsely decreased if patient taking Biotin.   04/21/2020 3.900 0.270 - 4.200 uIU/mL Final     Comment:     TSH results may be falsely decreased if patient taking Biotin.     Results for orders placed or performed in visit on 04/29/20   Rheumatoid factor   Result Value Ref Range    Rheumatoid Factor Quantitative 11.0 0.0 - 14.0 IU/mL   C-reactive protein   Result Value Ref Range    C-Reactive Protein 0.50 0.00 - 0.50 mg/dL   GARCIA   Result Value Ref Range    Antinuclear Antibodies (GARCIA) Negative Negative   Uric acid   Result Value Ref Range    Uric Acid 5.8 (H) 2.4 - 5.7 mg/dL   Results for orders placed or performed in visit on 04/29/20   CBC Auto Differential   Result Value Ref Range    WBC 7.14 3.40 - 10.80 10*3/mm3    RBC 4.67 3.77 - 5.28 10*6/mm3    Hemoglobin 14.8 12.0 - 15.9 g/dL    Hematocrit 42.7 34.0 - 46.6 %    MCV 91.4 79.0 - 97.0 fL    MCH 31.7 26.6 - 33.0 pg    MCHC 34.7 31.5 - 35.7 g/dL    RDW 12.8 12.3 - 15.4 %    RDW-SD 42.1 37.0 - 54.0 fl    MPV 10.9 6.0 - 12.0 fL    Platelets 339 140 - 450 10*3/mm3   Vitamin D 25 Hydroxy   Result Value Ref Range    25 Hydroxy, Vitamin D 44.7 30.0 - 100.0 ng/ml   Manual Differential   Result Value Ref Range    Neutrophil % 50.0 42.7 - 76.0 %    Lymphocyte % 37.5 19.6 - 45.3 %    Monocyte % 9.4 5.0 - 12.0 %    Eosinophil % 3.1 0.3 - 6.2 %    Neutrophils Absolute 3.57 1.70 - 7.00 10*3/mm3    Lymphocytes Absolute 2.68 0.70 - 3.10 10*3/mm3    Monocytes Absolute 0.67 0.10 - 0.90 10*3/mm3    Eosinophils Absolute 0.22 0.00 - 0.40 10*3/mm3    RBC Morphology Normal Normal    WBC Morphology Normal Normal    Platelet Morphology Normal Normal   TSH   Result Value Ref Range    TSH 2.110 0.270 - 4.200 uIU/mL   T4   Result Value Ref Range    T4, Total 14.60 (H) 4.50 - 11.70 mcg/dL   Hemoglobin A1c   Result Value Ref Range    Hemoglobin A1C 5.5 3.5 - 5.6 %   Vitamin B12   Result Value Ref Range     Vitamin B-12 297 211 - 946 pg/mL   Lipid Panel   Result Value Ref Range    Total Cholesterol 248 (H) 0 - 200 mg/dL    Triglycerides 325 (H) 0 - 150 mg/dL    HDL Cholesterol 50 40 - 60 mg/dL    LDL Cholesterol  133 (H) 0 - 100 mg/dL    VLDL Cholesterol 65 mg/dL    LDL/HDL Ratio 2.66    Comprehensive Metabolic Panel   Result Value Ref Range    Glucose 105 (H) 65 - 99 mg/dL    BUN 9 6 - 20 mg/dL    Creatinine 0.86 0.57 - 1.00 mg/dL    Sodium 142 136 - 145 mmol/L    Potassium 4.3 3.5 - 5.2 mmol/L    Chloride 104 98 - 107 mmol/L    CO2 24.0 22.0 - 29.0 mmol/L    Calcium 10.2 8.6 - 10.5 mg/dL    Total Protein 8.3 6.0 - 8.5 g/dL    Albumin 4.60 3.50 - 5.20 g/dL    ALT (SGPT) 75 (H) 1 - 33 U/L    AST (SGOT) 36 (H) 1 - 32 U/L    Alkaline Phosphatase 79 39 - 117 U/L    Total Bilirubin 0.2 0.2 - 1.2 mg/dL    eGFR Non African Amer 70 >60 mL/min/1.73    Globulin 3.7 gm/dL    A/G Ratio 1.2 g/dL    BUN/Creatinine Ratio 10.5 7.0 - 25.0    Anion Gap 14.0 5.0 - 15.0 mmol/L   Results for orders placed or performed during the hospital encounter of 04/21/20   Gold Top - SST   Result Value Ref Range    Extra Tube Hold for add-ons.    Green Top (Gel)   Result Value Ref Range    Extra Tube Hold for add-ons.    Scan Slide   Result Value Ref Range    Dacrocytes Slight/1+ None Seen    Poikilocytes Slight/1+ None Seen    WBC Morphology Normal Normal    Platelet Estimate Adequate Normal    Large Platelets Slight/1+ None Seen   SARS-CoV-2 PCR ([facility]erry IN-HOUSE PERFORMED), NP SWAB IN TRANSPORT MEDIA - Swab, Nasopharynx   Result Value Ref Range    COVID19 Not Detected Not Detected   Respiratory Panel, PCR - Swab, Nasopharynx   Result Value Ref Range    ADENOVIRUS, PCR Not Detected Not Detected    Coronavirus 229E Not Detected Not Detected    Coronavirus HKU1 Not Detected Not Detected    Coronavirus NL63 Not Detected Not Detected    Coronavirus OC43 Not Detected Not Detected    Human Metapneumovirus Not Detected Not Detected    Human  Rhinovirus/Enterovirus Not Detected Not Detected    Influenza B PCR Not Detected Not Detected    Parainfluenza Virus 1 Not Detected Not Detected    Parainfluenza Virus 2 Not Detected Not Detected    Parainfluenza Virus 3 Not Detected Not Detected    Parainfluenza Virus 4 Not Detected Not Detected    Bordetella pertussis pcr Not Detected Not Detected    Influenza A H1 2009 PCR Not Detected Not Detected    Chlamydophila pneumoniae PCR Not Detected Not Detected    Mycoplasma pneumo by PCR Not Detected Not Detected    Influenza A PCR Not Detected Not Detected    Influenza A H3 Not Detected Not Detected    Influenza A H1 Not Detected Not Detected    RSV, PCR Not Detected Not Detected   Procalcitonin   Result Value Ref Range    Procalcitonin 0.07 (L) 0.10 - 0.25 ng/mL   CBC Auto Differential   Result Value Ref Range    WBC 8.30 3.40 - 10.80 10*3/mm3    RBC 4.50 3.77 - 5.28 10*6/mm3    Hemoglobin 14.7 12.0 - 15.9 g/dL    Hematocrit 40.3 34.0 - 46.6 %    MCV 89.6 79.0 - 97.0 fL    MCH 32.7 26.6 - 33.0 pg    MCHC 36.5 (H) 31.5 - 35.7 g/dL    RDW 13.2 12.3 - 15.4 %    RDW-SD 41.1 37.0 - 54.0 fl    MPV 7.7 6.0 - 12.0 fL    Platelets 294 140 - 450 10*3/mm3    Neutrophil % 63.9 42.7 - 76.0 %    Lymphocyte % 28.9 19.6 - 45.3 %    Monocyte % 5.4 5.0 - 12.0 %    Eosinophil % 0.6 0.3 - 6.2 %    Basophil % 1.2 0.0 - 1.5 %    Neutrophils, Absolute 5.30 1.70 - 7.00 10*3/mm3    Lymphocytes, Absolute 2.40 0.70 - 3.10 10*3/mm3    Monocytes, Absolute 0.40 0.10 - 0.90 10*3/mm3    Eosinophils, Absolute 0.00 0.00 - 0.40 10*3/mm3    Basophils, Absolute 0.10 0.00 - 0.20 10*3/mm3    nRBC 0.0 0.0 - 0.2 /100 WBC     *Note: Due to a large number of results and/or encounters for the requested time period, some results have not been displayed. A complete set of results can be found in Results Review.       Assessment/Plan   Vibha was seen today for follow-up and cancer.    Diagnoses and all orders for this visit:    Malignant melanoma of back  (CMS/HCC)    Left axillary swelling    Tachycardia    Other fatigue    Anxiety    Other orders  -     nebivolol (Bystolic) 2.5 MG tablet; Take 1 tablet by mouth Daily.      Stop metoprolol and change to bystolic  Refer to oncology  Take buspar as needed for anxiety  Keep fu with surgeons for biospsy

## 2020-06-11 ENCOUNTER — HOSPITAL ENCOUNTER (OUTPATIENT)
Dept: ULTRASOUND IMAGING | Facility: HOSPITAL | Age: 50
Discharge: HOME | End: 2020-06-11

## 2020-06-11 ENCOUNTER — HOSPITAL ENCOUNTER (OUTPATIENT)
Dept: MAMMOGRAPHY | Facility: HOSPITAL | Age: 50
Discharge: HOME | End: 2020-06-11
Admitting: FAMILY MEDICINE

## 2020-06-11 ENCOUNTER — APPOINTMENT (OUTPATIENT)
Dept: OTHER | Facility: HOSPITAL | Age: 50
End: 2020-06-11

## 2020-06-11 DIAGNOSIS — M79.89 LEFT AXILLARY SWELLING: ICD-10-CM

## 2020-06-11 DIAGNOSIS — Z12.39 BREAST CANCER SCREENING: ICD-10-CM

## 2020-06-11 DIAGNOSIS — N63.10 BREAST MASS, RIGHT: ICD-10-CM

## 2020-06-11 DIAGNOSIS — Z00.6 EXAMINATION FOR NORMAL COMPARISON OR CONTROL IN CLINICAL RESEARCH: ICD-10-CM

## 2020-06-11 DIAGNOSIS — N63.0 LUMP OR MASS IN BREAST: ICD-10-CM

## 2020-06-11 LAB
ALBUMIN SERPL-MCNC: 4.9 G/DL (ref 3.5–5.2)
ALBUMIN/GLOB SERPL: 1.4 G/DL
ALP SERPL-CCNC: 76 U/L (ref 39–117)
ALT SERPL W P-5'-P-CCNC: 68 U/L (ref 1–33)
ANION GAP SERPL CALCULATED.3IONS-SCNC: 13.3 MMOL/L (ref 5–15)
AST SERPL-CCNC: 44 U/L (ref 1–32)
BILIRUB SERPL-MCNC: 0.4 MG/DL (ref 0.2–1.2)
BUN BLD-MCNC: 12 MG/DL (ref 6–20)
BUN/CREAT SERPL: 13.5 (ref 7–25)
CALCIUM SPEC-SCNC: 10 MG/DL (ref 8.6–10.5)
CHLORIDE SERPL-SCNC: 100 MMOL/L (ref 98–107)
CK SERPL-CCNC: 82 U/L (ref 20–180)
CO2 SERPL-SCNC: 28.7 MMOL/L (ref 22–29)
CREAT BLD-MCNC: 0.89 MG/DL (ref 0.57–1)
CRP SERPL-MCNC: 0.24 MG/DL (ref 0–0.5)
GFR SERPL CREATININE-BSD FRML MDRD: 67 ML/MIN/1.73
GLOBULIN UR ELPH-MCNC: 3.5 GM/DL
GLUCOSE BLD-MCNC: 89 MG/DL (ref 65–99)
POTASSIUM BLD-SCNC: 4.2 MMOL/L (ref 3.5–5.2)
PROT SERPL-MCNC: 8.4 G/DL (ref 6–8.5)
SODIUM BLD-SCNC: 142 MMOL/L (ref 136–145)
T4 FREE SERPL-MCNC: 1.47 NG/DL (ref 0.93–1.7)
TSH SERPL DL<=0.05 MIU/L-ACNC: 2.24 UIU/ML (ref 0.27–4.2)

## 2020-06-11 PROCEDURE — G0279 TOMOSYNTHESIS, MAMMO: HCPCS

## 2020-06-11 PROCEDURE — 77066 DX MAMMO INCL CAD BI: CPT

## 2020-06-11 PROCEDURE — 77067 SCR MAMMO BI INCL CAD: CPT

## 2020-06-11 PROCEDURE — 77063 BREAST TOMOSYNTHESIS BI: CPT

## 2020-06-11 PROCEDURE — 76882 US LMTD JT/FCL EVL NVASC XTR: CPT

## 2020-06-12 ENCOUNTER — LAB (OUTPATIENT)
Dept: FAMILY MEDICINE CLINIC | Facility: CLINIC | Age: 50
End: 2020-06-12

## 2020-06-12 DIAGNOSIS — C43.9 MALIGNANT MELANOMA, UNSPECIFIED SITE (HCC): ICD-10-CM

## 2020-06-12 PROCEDURE — 85652 RBC SED RATE AUTOMATED: CPT | Performed by: FAMILY MEDICINE

## 2020-06-12 PROCEDURE — 83615 LACTATE (LD) (LDH) ENZYME: CPT | Performed by: FAMILY MEDICINE

## 2020-06-12 PROCEDURE — 85025 COMPLETE CBC W/AUTO DIFF WBC: CPT | Performed by: FAMILY MEDICINE

## 2020-06-13 LAB
BASOPHILS # BLD AUTO: 0.08 10*3/MM3 (ref 0–0.2)
BASOPHILS NFR BLD AUTO: 0.8 % (ref 0–1.5)
DEPRECATED RDW RBC AUTO: 47 FL (ref 37–54)
EOSINOPHIL # BLD AUTO: 0.13 10*3/MM3 (ref 0–0.4)
EOSINOPHIL NFR BLD AUTO: 1.4 % (ref 0.3–6.2)
ERYTHROCYTE [DISTWIDTH] IN BLOOD BY AUTOMATED COUNT: 13.8 % (ref 12.3–15.4)
ERYTHROCYTE [SEDIMENTATION RATE] IN BLOOD: 11 MM/HR (ref 0–20)
HCT VFR BLD AUTO: 41.9 % (ref 34–46.6)
HGB BLD-MCNC: 14.2 G/DL (ref 12–15.9)
IMM GRANULOCYTES # BLD AUTO: 0.03 10*3/MM3 (ref 0–0.05)
IMM GRANULOCYTES NFR BLD AUTO: 0.3 % (ref 0–0.5)
LDH SERPL-CCNC: 331 U/L (ref 135–214)
LYMPHOCYTES # BLD AUTO: 2.99 10*3/MM3 (ref 0.7–3.1)
LYMPHOCYTES NFR BLD AUTO: 31.3 % (ref 19.6–45.3)
MCH RBC QN AUTO: 31.3 PG (ref 26.6–33)
MCHC RBC AUTO-ENTMCNC: 33.9 G/DL (ref 31.5–35.7)
MCV RBC AUTO: 92.3 FL (ref 79–97)
MONOCYTES # BLD AUTO: 0.79 10*3/MM3 (ref 0.1–0.9)
MONOCYTES NFR BLD AUTO: 8.3 % (ref 5–12)
NEUTROPHILS # BLD AUTO: 5.53 10*3/MM3 (ref 1.7–7)
NEUTROPHILS NFR BLD AUTO: 57.9 % (ref 42.7–76)
NRBC BLD AUTO-RTO: 0 /100 WBC (ref 0–0.2)
PLATELET # BLD AUTO: 361 10*3/MM3 (ref 140–450)
PMV BLD AUTO: 10.4 FL (ref 6–12)
RBC # BLD AUTO: 4.54 10*6/MM3 (ref 3.77–5.28)
WBC NRBC COR # BLD: 9.55 10*3/MM3 (ref 3.4–10.8)

## 2020-06-15 DIAGNOSIS — R79.89 ELEVATED LFTS: Primary | ICD-10-CM

## 2020-06-25 RX ORDER — NEBIVOLOL 2.5 MG/1
2.5 TABLET ORAL DAILY
Qty: 180 TABLET | Refills: 1 | Status: SHIPPED | OUTPATIENT
Start: 2020-06-25 | End: 2020-09-11

## 2020-06-25 RX ORDER — NEBIVOLOL 2.5 MG/1
2.5 TABLET ORAL DAILY
Qty: 30 TABLET | Refills: 2 | Status: CANCELLED | OUTPATIENT
Start: 2020-06-25

## 2020-06-26 ENCOUNTER — CLINICAL SUPPORT (OUTPATIENT)
Dept: FAMILY MEDICINE CLINIC | Facility: CLINIC | Age: 50
End: 2020-06-26

## 2020-06-26 DIAGNOSIS — R30.0 DYSURIA: Primary | ICD-10-CM

## 2020-06-26 LAB
BILIRUB BLD-MCNC: ABNORMAL MG/DL
CLARITY, POC: ABNORMAL
COLOR UR: ABNORMAL
GLUCOSE UR STRIP-MCNC: ABNORMAL MG/DL
KETONES UR QL: NEGATIVE
LEUKOCYTE EST, POC: NEGATIVE
NITRITE UR-MCNC: POSITIVE MG/ML
PH UR: 5.5 [PH] (ref 5–8)
PROT UR STRIP-MCNC: ABNORMAL MG/DL
RBC # UR STRIP: ABNORMAL /UL
SP GR UR: 1.02 (ref 1–1.03)
UROBILINOGEN UR QL: NORMAL

## 2020-06-26 PROCEDURE — 87086 URINE CULTURE/COLONY COUNT: CPT | Performed by: FAMILY MEDICINE

## 2020-06-26 PROCEDURE — 81003 URINALYSIS AUTO W/O SCOPE: CPT | Performed by: FAMILY MEDICINE

## 2020-06-26 RX ORDER — CEPHALEXIN 500 MG/1
500 CAPSULE ORAL EVERY 12 HOURS
Qty: 14 CAPSULE | Refills: 0 | Status: SHIPPED | OUTPATIENT
Start: 2020-06-26 | End: 2020-07-03

## 2020-06-26 NOTE — PROGRESS NOTES
Patient c/o dysuria. Urine dip completed per MD protocol for dysuria. Urinalysis in house showed multiple abnormalities. Contacted Dr. Lorena Metzger MD via telephone. Advised her of the UA results. Patient is allergic/intolerant to Cipro. Dr. Metzger gave verbal order to this nurse to send in Keflex 500 BID x 7 days. Patient notified. Dr. Metzger also wanted urine sent for culture. Urine culture was pulled.

## 2020-06-27 LAB — BACTERIA SPEC AEROBE CULT: NO GROWTH

## 2020-07-21 DIAGNOSIS — C43.59 MALIGNANT MELANOMA OF BACK (HCC): Primary | ICD-10-CM

## 2020-07-21 RX ORDER — LISINOPRIL 2.5 MG/1
2.5 TABLET ORAL DAILY
Qty: 90 TABLET | Refills: 0 | Status: SHIPPED | OUTPATIENT
Start: 2020-07-21 | End: 2020-10-26

## 2020-07-27 RX ORDER — CHOLESTYRAMINE 4 G/5.5G
POWDER, FOR SUSPENSION ORAL
Qty: 231 G | Refills: 3 | Status: SHIPPED | OUTPATIENT
Start: 2020-07-27 | End: 2020-10-20

## 2020-07-27 NOTE — TELEPHONE ENCOUNTER
Pt is requesting her Prevalite be refilled. States that the Cholestyramine is not effective for her.     Date of last refill: 2-    Is there an Inspect on file: n/a    Last appt date: 6-     Next appt date: none       Additional information:

## 2020-08-10 DIAGNOSIS — M54.6 BACK PAIN OF THORACOLUMBAR REGION: Primary | ICD-10-CM

## 2020-08-10 DIAGNOSIS — M54.50 BACK PAIN OF THORACOLUMBAR REGION: Primary | ICD-10-CM

## 2020-09-10 PROBLEM — C43.59 MELANOMA OF BACK: Status: ACTIVE | Noted: 2020-07-23

## 2020-09-10 PROBLEM — C43.9 MELANOMA: Status: ACTIVE | Noted: 2020-06-17

## 2020-09-10 RX ORDER — METOPROLOL SUCCINATE 25 MG/1
25 TABLET, EXTENDED RELEASE ORAL
COMMUNITY
Start: 2020-06-17 | End: 2020-09-11

## 2020-09-11 ENCOUNTER — OFFICE VISIT (OUTPATIENT)
Dept: FAMILY MEDICINE CLINIC | Facility: CLINIC | Age: 50
End: 2020-09-11

## 2020-09-11 VITALS
SYSTOLIC BLOOD PRESSURE: 146 MMHG | HEIGHT: 66 IN | HEART RATE: 80 BPM | TEMPERATURE: 98 F | WEIGHT: 173.8 LBS | OXYGEN SATURATION: 96 % | BODY MASS INDEX: 27.93 KG/M2 | DIASTOLIC BLOOD PRESSURE: 86 MMHG

## 2020-09-11 DIAGNOSIS — I10 ESSENTIAL HYPERTENSION: ICD-10-CM

## 2020-09-11 DIAGNOSIS — M54.16 LEFT LUMBAR RADICULOPATHY: ICD-10-CM

## 2020-09-11 DIAGNOSIS — Z82.49 FH: CAD (CORONARY ARTERY DISEASE): ICD-10-CM

## 2020-09-11 DIAGNOSIS — R79.89 ELEVATED LFTS: ICD-10-CM

## 2020-09-11 DIAGNOSIS — R07.89 OTHER CHEST PAIN: ICD-10-CM

## 2020-09-11 DIAGNOSIS — M51.36 LUMBAR DEGENERATIVE DISC DISEASE: ICD-10-CM

## 2020-09-11 DIAGNOSIS — R53.0 NEOPLASTIC MALIGNANT RELATED FATIGUE: ICD-10-CM

## 2020-09-11 DIAGNOSIS — C43.59 MELANOMA OF BACK (HCC): Primary | ICD-10-CM

## 2020-09-11 DIAGNOSIS — G47.01 INSOMNIA DUE TO MEDICAL CONDITION: ICD-10-CM

## 2020-09-11 DIAGNOSIS — F43.23 ADJUSTMENT DISORDER WITH MIXED ANXIETY AND DEPRESSED MOOD: ICD-10-CM

## 2020-09-11 PROCEDURE — 99215 OFFICE O/P EST HI 40 MIN: CPT | Performed by: FAMILY MEDICINE

## 2020-09-11 RX ORDER — ZOLPIDEM TARTRATE 5 MG/1
5 TABLET ORAL NIGHTLY PRN
Qty: 14 TABLET | Refills: 0 | Status: SHIPPED | OUTPATIENT
Start: 2020-09-11 | End: 2020-10-26

## 2020-09-11 RX ORDER — ESCITALOPRAM OXALATE 10 MG/1
10 TABLET ORAL DAILY
Qty: 30 TABLET | Refills: 2 | Status: SHIPPED | OUTPATIENT
Start: 2020-09-11 | End: 2020-12-17 | Stop reason: SDUPTHER

## 2020-09-11 NOTE — PROGRESS NOTES
Subjective   Vibha Darling is a 50 y.o. female.   Chief Complaint   Patient presents with   • Fatigue       History of Present Illness   Patient establishing today for fatigue. She is currently taking radiation for melanoma, stage 3c. She takes the radiation every day, she states she has 6 days left.   Above reviewed and verified.  Louisa is a 50-year-old white female who was diagnosed with stage IIIc melanoma of the mid left back back in early June of this year.  She got her biopsy results on June 9 and was referred up to  where she was evaluated by Dr. Sanabria and underwent wide excision on July 1.  She is pretty well healed up from that and was recommended to have radiation to that area.  She has been driving to [place] every day for the past month for radiation.  She has 6 more treatments.  They are planning to recommend immunotherapy following completion of her radiation.  She is not sure she wants to do that.  She saw medical oncologist at [facility] on August 27 for this.  She had a PET/CT on July 31 which showed no evidence of alfonso disease or metastases.  MRI of the brain on July 27 showed no intracranial disease.  It looks like the recommended treatments were anti--PD1 monotherapy versus dabrafenib/trametinib.    Her melanoma is being well managed through .  She comes to see me today for some additional reasons.  First, is that of severe fatigue.  She tells me that about a week ago she experienced what she called 20/10 fatigue.  She tells me that she could not hardly stand up to walk up the stairs.  Today she describes it as better and only 8/10.  She can feel the fatigue come on in waves.  It seems to be triggered by thinking about her melanoma in the unknowns of the future including prognosis.  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  She becomes very tearful discussing this.  She tells me she is fairly certain this is her nerves.  She describes being a little anxious throughout her life.  She has not taken any antidepressants.  She does not sleep well.  She describes only getting 2 to 3 hours of sleep at night.    Second problem is numbness in her left leg.  In late July she tells me that she just could not feel her left leg.  She went to [facility] where she has family members that work.  They did a thorough evaluation including an MRI of her lumbar spine in the emergency room.  They told her she has a \"bad back\".  MRI showed lumbar spondylosis with degenerative disc disease and lateral recess stenosis at L5-S1.  She also underwent an MRI of her brain which was normal.  No evidence of brain metastases.  They gave her a couple shots of medication including a muscle relaxer and her legs felt better.    We reviewed her medications and she is not taking diastolic nor metoprolol.  She remains only on Zestril 2.5 mg/day.  She apparently does have a history of hypertension.    She has some chronically elevated LFTs being treated with Questran.  She had episodes of anterior chest pain back in April.  Stress test was ordered, but was delayed because of coronavirus.  She does have a personal history of hypertension, which at the present time is not adequately controlled.  She has a strong family history of heart disease and hypertension in her father, father's brothers and sisters and her own brothers and sisters.    Patient Active Problem List    Diagnosis Date Noted   • Lumbar degenerative disc disease 09/13/2020     Note Last Updated: 9/13/2020     MRI at [facility] - 7/27/20 - 1. No finding suspicious for metastatic disease  2. Lumbar spondylosis with degenerative disc disease and lateral recess stenosis at L5-S1.     • Melanoma of back (CMS/[facility]) 07/23/2020   • Melanoma (CMS/[facility]) 06/17/2020   • Abnormal electrocardiogram (ECG) " (EKG) 04/22/2020   • Chest pain, atypical 04/21/2020   • Mixed hyperlipidemia 04/21/2020   • Elevated LFTs 04/21/2020   • Overweight (BMI 25.0-29.9) 04/21/2020   • Generalized anxiety disorder 04/21/2020   • Essential hypertension 05/01/2019           Past Surgical History:   Procedure Laterality Date   • CHOLECYSTECTOMY     • COLONOSCOPY     • DILATATION AND CURETTAGE     • DILATATION AND CURETTAGE  2010   • ECTOPIC PREGNANCY     • ECTOPIC PREGNANCY SURGERY     • SKIN CANCER EXCISION  07/01/2020     Current Outpatient Medications on File Prior to Visit   Medication Sig   • lisinopril (PRINIVIL,ZESTRIL) 2.5 MG tablet Take 1 tablet by mouth Daily.   • Multiple Vitamins-Minerals (MULTIVITAMIN ADULT PO) Take  by mouth.   • PREVALITE 4 GM/DOSE powder 2 scoops po qd with 8 ozs in fluid of choice.   • mupirocin (BACTROBAN) 2 % ointment      No current facility-administered medications on file prior to visit.      Allergies   Allergen Reactions   • Doxycycline Other (See Comments) and Rash     Flushing     • Dye Fdc Red [Red Dye] Anaphylaxis   • Latex Itching and Rash   • Levofloxacin Other (See Comments) and Rash     Flushing     • Ciprofloxacin Other (See Comments)     Flushing    • Contrast Dye Itching   • Iodinated Diagnostic Agents Itching   • Penicillins Rash     Social History     Socioeconomic History   • Marital status:      Spouse name: Not on file   • Number of children: Not on file   • Years of education: Not on file   • Highest education level: Not on file   Tobacco Use   • Smoking status: Never Smoker   • Smokeless tobacco: Never Used   Substance and Sexual Activity   • Alcohol use: No     Frequency: Never   • Drug use: Never   • Sexual activity: Defer     Family History   Problem Relation Age of Onset   • Heart disease Father    • Heart disease Paternal Aunt    • Heart disease Paternal Uncle    • Thrombophilia Paternal Uncle    • Heart disease Paternal Uncle    • Alcohol abuse Paternal Uncle    •  "Hypertension Sister    • Hypertension Brother    • Hypertension Sister    • Hypertension Sister      The following portions of the patient's history were reviewed and updated as appropriate: allergies, current medications, past family history, past medical history, past social history, past surgical history and problem list.    Review of Systems   Constitutional: Positive for fatigue. Negative for chills and fever.   Respiratory: Positive for chest tightness and shortness of breath. Negative for cough, choking and wheezing.    Cardiovascular: Positive for chest pain. Negative for leg swelling.   Gastrointestinal: Negative for abdominal pain, diarrhea, nausea and vomiting.   Neurological: Negative for dizziness, syncope, speech difficulty, light-headedness and headache.   Psychiatric/Behavioral: The patient is nervous/anxious.        Objective   /86 (BP Location: Right arm, Patient Position: Sitting, Cuff Size: Adult)   Pulse 80   Temp 98 °F (36.7 °C) (Infrared)   Ht 167.6 cm (65.98\")   Wt 78.8 kg (173 lb 12.8 oz)   SpO2 96%   BMI 28.07 kg/m²   Physical Exam   Constitutional: She is oriented to person, place, and time. She appears well-developed and well-nourished.   Wearing a face mask     HENT:   Head: Normocephalic and atraumatic.   Eyes: Conjunctivae are normal.   Neck: Normal range of motion.   Cardiovascular: Normal rate.   Pulmonary/Chest: Effort normal.   Long scar over left upper mid back.  Appears to be healing very well.   Musculoskeletal: Normal range of motion.   Neurological: She is alert and oriented to person, place, and time.   Skin: Skin is warm and dry. No rash noted.   Psychiatric: Her speech is normal and behavior is normal. Judgment and thought content normal. Her mood appears anxious.       Assessment/Plan   Diagnoses and all orders for this visit:    1. Melanoma of back (CMS/HCC) (Primary)    2. Adjustment disorder with mixed anxiety and depressed mood    3. Insomnia due to medical " condition  -     zolpidem (AMBIEN) 5 MG tablet; Take 1 tablet by mouth At Night As Needed for Sleep.  Dispense: 14 tablet; Refill: 0    4. Essential hypertension  -     Adult Transthoracic Echo Complete W/ Cont if Necessary Per Protocol  -     CBC & Differential  -     Comprehensive Metabolic Panel    5. Lumbar degenerative disc disease  -     EMG & Nerve Conduction Test; Future    6. Left lumbar radiculopathy  -     EMG & Nerve Conduction Test; Future    7. Neoplastic malignant related fatigue  -     TSH  -     Lyme, IgM, Early Test / Reflex  -     Ferritin    8. FH: CAD (coronary artery disease)  -     Adult Transthoracic Echo Complete W/ Cont if Necessary Per Protocol    9. Other chest pain  -     Adult Transthoracic Echo Complete W/ Cont if Necessary Per Protocol    10. Elevated LFTs  -     Hepatitis panel, acute    Other orders  -     escitalopram (Lexapro) 10 MG tablet; Take 1 tablet by mouth Daily.  Dispense: 30 tablet; Refill: 2    Her melanoma is being managed by specialist in [place].  Follow all recommendations per their instructions.    Left leg numbness- this certainly could be a left lumbar radiculopathy based on the description of the MRI done July 27 at [facility].  I would recommend we proceed with lower extremity nerve conduction testing and if indeed symptoms and findings localize to her L5-S1 region, we can approach that directly with assistance from neurosurgery or interventional pain management.    Fatigue- at this point it is unclear whether fatigue is a symptom of continuing to work while dealing with a new cancer in the setting of an uncertain future versus other medical problems.  She has had fairly recent lab work.  As of mid June, she was not anemic, thyroid testing was normal her A1c was normal at 5.5%.  Vitamin D level was normal at 44.7, vitamin B-12 level was low normal, but still normal at 297.  Metabolic panel showed a normal blood sugar, normal renal function.   She had a normal GARCIA, CRP, So it certainly seems like she has had some work-up.  I recommend we repeat those tests and perhaps even expand a bit.  She has been assured that the radiation is being targeted so as to limit exposure to her lungs and prevent exposure to her heart.  I think we should get an echocardiogram to rule out a cardiomyopathy as a cause for her severe fatigue.  If all the testing is normal, then I suspect the fatigue is due to exhaustion and her mood and energy is beginning to suffer as her ability to cope with all this is eroding.    Adjustment disorder- to address the possibility that her fatigue is simply related to exhaustion and failing coping mechanisms, I would like to start Lexapro at a low dose and titrated upward.  She is also sleeping very poorly, so I have recommended we do 2 weeks of Ambien at bedtime to try to improve her sleep quality.  That also may help her tolerance.  She does not want to start the Lexapro until her radiation treatments are done in 6 days.    Hypertension- I think she will require adjustments to her blood pressure medications.  When she first came in the office she was 182/94, but after sitting for a few minutes she was down to 146/86.    I do think it is essential that she is off work for a while to be able to focus on her own health as she recovers.    We will plan on checking her back here in 3 weeks.  Hopefully by then all of the tests will be done and she will of had a chance to start the Lexapro and titrated up to 10 mg/day.  We will reevaluate her at that point.    Overall, I spent around an hour with Vibha today reviewing history, counseling, coordinating, formulating a plan.  All this time was face-to-face, not counting the documentation completion later.         Return in about 3 weeks (around 10/2/2020).    Call with any problems or concerns before next visit

## 2020-09-13 PROBLEM — M51.36 LUMBAR DEGENERATIVE DISC DISEASE: Status: ACTIVE | Noted: 2020-09-13

## 2020-09-13 NOTE — PROGRESS NOTES
Subjective   Vibha Darling is a 50 y.o. female.   Chief Complaint   Patient presents with   • Fatigue       History of Present Illness   Patient establishing today for fatigue. She is currently taking radiation for melanoma, stage 3c. She takes the radiation every day, she states she has 6 days left.   Above reviewed and verified.  Louisa is a 50-year-old white female who was diagnosed with stage IIIc melanoma of the mid left back back in early June of this year.  She got her biopsy results on June 9 and was referred up to  where she was evaluated by Dr. Sanabria and underwent wide excision on July 1.  She is pretty well healed up from that and was recommended to have radiation to that area.  She has been driving to [place] every day for the past month for radiation.  She has 6 more treatments.  They are planning to recommend immunotherapy following completion of her radiation.  She is not sure she wants to do that.  She saw medical oncologist at [facility] on August 27 for this.  She had a PET/CT on July 31 which showed no evidence of alfonso disease or metastases.  MRI of the brain on July 27 showed no intracranial disease.  It looks like the recommended treatments were anti--PD1 monotherapy versus dabrafenib/trametinib.    Her melanoma is being well managed through .  She comes to see me today for some additional reasons.  First, is that of severe fatigue.  She tells me that about a week ago she experienced what she called 20/10 fatigue.  She tells me that she could not hardly stand up to walk up the stairs.  Today she describes it as better and only 8/10.  She can feel the fatigue come on in waves.  It seems to be triggered by thinking about her melanoma in the unknowns of the future including prognosis.  She only took a few days off around her surgery and has continued to work at least 1/2-day every day continuing to see patients despite driving to [place] and back for her radiation  "treatments.  She becomes very tearful discussing this.  She tells me she is fairly certain this is her nerves.  She describes being a little anxious throughout her life.  She has not taken any antidepressants.  She does not sleep well.  She describes only getting 2 to 3 hours of sleep at night.    Second problem is numbness in her left leg.  In late July she tells me that she just could not feel her left leg.  She went to [facility] where she has family members that work.  They did a thorough evaluation including an MRI of her lumbar spine in the emergency room.  They told her she has a \"bad back\".  MRI showed lumbar spondylosis with degenerative disc disease and lateral recess stenosis at L5-S1.  She also underwent an MRI of her brain which was normal.  No evidence of brain metastases.  They gave her a couple shots of medication including a muscle relaxer and her legs felt better.    We reviewed her medications and she is not taking diastolic nor metoprolol.  She remains only on Zestril 2.5 mg/day.  She apparently does have a history of hypertension.    She has some chronically elevated LFTs being treated with Questran.  She had episodes of anterior chest pain back in April.  Stress test was ordered, but was delayed because of coronavirus.  She does have a personal history of hypertension, which at the present time is not adequately controlled.  She has a strong family history of heart disease and hypertension in her father, father's brothers and sisters and her own brothers and sisters.    Patient Active Problem List    Diagnosis Date Noted   • Lumbar degenerative disc disease 09/13/2020     Note Last Updated: 9/13/2020     MRI at [facility] - 7/27/20 - 1. No finding suspicious for metastatic disease  2. Lumbar spondylosis with degenerative disc disease and lateral recess stenosis at L5-S1.     • Melanoma of back (CMS/[facility]) 07/23/2020   • Melanoma (CMS/[facility]) 06/17/2020   • Abnormal electrocardiogram (ECG) " (EKG) 04/22/2020   • Chest pain, atypical 04/21/2020   • Mixed hyperlipidemia 04/21/2020   • Elevated LFTs 04/21/2020   • Overweight (BMI 25.0-29.9) 04/21/2020   • Generalized anxiety disorder 04/21/2020   • Essential hypertension 05/01/2019           Past Surgical History:   Procedure Laterality Date   • CHOLECYSTECTOMY     • COLONOSCOPY     • DILATATION AND CURETTAGE     • DILATATION AND CURETTAGE  2010   • ECTOPIC PREGNANCY     • ECTOPIC PREGNANCY SURGERY     • SKIN CANCER EXCISION  07/01/2020     Current Outpatient Medications on File Prior to Visit   Medication Sig   • lisinopril (PRINIVIL,ZESTRIL) 2.5 MG tablet Take 1 tablet by mouth Daily.   • Multiple Vitamins-Minerals (MULTIVITAMIN ADULT PO) Take  by mouth.   • PREVALITE 4 GM/DOSE powder 2 scoops po qd with 8 ozs in fluid of choice.   • mupirocin (BACTROBAN) 2 % ointment      No current facility-administered medications on file prior to visit.      Allergies   Allergen Reactions   • Doxycycline Other (See Comments) and Rash     Flushing     • Dye Fdc Red [Red Dye] Anaphylaxis   • Latex Itching and Rash   • Levofloxacin Other (See Comments) and Rash     Flushing     • Ciprofloxacin Other (See Comments)     Flushing    • Contrast Dye Itching   • Iodinated Diagnostic Agents Itching   • Penicillins Rash     Social History     Socioeconomic History   • Marital status:      Spouse name: Not on file   • Number of children: Not on file   • Years of education: Not on file   • Highest education level: Not on file   Tobacco Use   • Smoking status: Never Smoker   • Smokeless tobacco: Never Used   Substance and Sexual Activity   • Alcohol use: No     Frequency: Never   • Drug use: Never   • Sexual activity: Defer     Family History   Problem Relation Age of Onset   • Heart disease Father    • Heart disease Paternal Aunt    • Heart disease Paternal Uncle    • Thrombophilia Paternal Uncle    • Heart disease Paternal Uncle    • Alcohol abuse Paternal Uncle    •  "Hypertension Sister    • Hypertension Brother    • Hypertension Sister    • Hypertension Sister      The following portions of the patient's history were reviewed and updated as appropriate: allergies, current medications, past family history, past medical history, past social history, past surgical history and problem list.    Review of Systems    Objective   /86 (BP Location: Right arm, Patient Position: Sitting, Cuff Size: Adult)   Pulse 80   Temp 98 °F (36.7 °C) (Infrared)   Ht 167.6 cm (65.98\")   Wt 78.8 kg (173 lb 12.8 oz)   SpO2 96%   BMI 28.07 kg/m²   Physical Exam   Constitutional: She is oriented to person, place, and time. She appears well-developed and well-nourished.   Wearing a face mask     HENT:   Head: Normocephalic and atraumatic.   Eyes: Conjunctivae are normal.   Neck: Normal range of motion.   Cardiovascular: Normal rate.   Pulmonary/Chest: Effort normal.   Musculoskeletal: Normal range of motion.   Neurological: She is alert and oriented to person, place, and time.   Skin: Skin is warm and dry. No rash noted.   Psychiatric: Her behavior is normal.               Assessment/Plan   Diagnoses and all orders for this visit:    1. Melanoma of back (CMS/HCC) (Primary)    2. Adjustment disorder with mixed anxiety and depressed mood    3. Insomnia due to medical condition  -     zolpidem (AMBIEN) 5 MG tablet; Take 1 tablet by mouth At Night As Needed for Sleep.  Dispense: 14 tablet; Refill: 0    4. Essential hypertension  -     Adult Transthoracic Echo Complete W/ Cont if Necessary Per Protocol  -     CBC & Differential  -     Comprehensive Metabolic Panel    5. Lumbar degenerative disc disease  -     EMG & Nerve Conduction Test; Future    6. Left lumbar radiculopathy  -     EMG & Nerve Conduction Test; Future    7. Neoplastic malignant related fatigue  -     TSH  -     Lyme, IgM, Early Test / Reflex  -     Ferritin    8. FH: CAD (coronary artery disease)  -     Adult Transthoracic Echo " Complete W/ Cont if Necessary Per Protocol    9. Other chest pain  -     Adult Transthoracic Echo Complete W/ Cont if Necessary Per Protocol    10. Elevated LFTs  -     Hepatitis panel, acute    Other orders  -     escitalopram (Lexapro) 10 MG tablet; Take 1 tablet by mouth Daily.  Dispense: 30 tablet; Refill: 2    Her melanoma is being managed by specialist in [place].  Follow all recommendations per their instructions.    Left leg numbness- this certainly could be a left lumbar radiculopathy based on the description of the MRI done July 27 at [facility].  I would recommend we proceed with lower extremity nerve conduction testing and if indeed symptoms and findings localize to her L5-S1 region, we can approach that directly with assistance from neurosurgery or interventional pain management.    Fatigue- at this point it is unclear whether fatigue is a symptom of continuing to work while dealing with a new cancer in the setting of an uncertain future versus other medical problems.  She has had fairly recent lab work.  As of mid June, she was not anemic, thyroid testing was normal her A1c was normal at 5.5%.  Vitamin D level was normal at 44.7, vitamin B-12 level was low normal, but still normal at 297.  Metabolic panel showed a normal blood sugar, normal renal function.  She had a normal GARCIA, CRP, So it certainly seems like she has had some work-up.  I recommend we repeat those tests and perhaps even expand a bit.  She has been assured that the radiation is being targeted so as to limit exposure to her lungs and prevent exposure to her heart.  I think we should get an echocardiogram to rule out a cardiomyopathy as a cause for her severe fatigue.  If all the testing is normal, then I suspect the fatigue is due to exhaustion and her mood and energy is beginning to suffer as her ability to cope with all this is eroding.    Adjustment disorder- to address the possibility that her fatigue is simply  related to exhaustion and failing coping mechanisms, I would like to start Lexapro at a low dose and titrated upward.  She is also sleeping very poorly, so I have recommended we do 2 weeks of Ambien at bedtime to try to improve her sleep quality.  That also may help her tolerance.  She does not want to start the Lexapro until her radiation treatments are done in 6 days.    Hypertension- I think she will require adjustments to her blood pressure medications.  When she first came in the office she was 182/94, but after sitting for a few minutes she was down to 146/86.    I do think it is essential that she is off work for a while to be able to focus on her own health as she recovers.    We will plan on checking her back here in 3 weeks.  Hopefully by then all of the tests will be done and she will of had a chance to start the Lexapro and titrated up to 10 mg/day.  We will reevaluate her at that point.         Return in about 3 weeks (around 10/2/2020).    Call with any problems or concerns before next visit

## 2020-09-14 ENCOUNTER — LAB (OUTPATIENT)
Dept: FAMILY MEDICINE CLINIC | Facility: CLINIC | Age: 50
End: 2020-09-14

## 2020-09-14 DIAGNOSIS — I10 ESSENTIAL HYPERTENSION: ICD-10-CM

## 2020-09-14 DIAGNOSIS — R79.89 ELEVATED LFTS: ICD-10-CM

## 2020-09-14 DIAGNOSIS — C43.59 MELANOMA OF BACK (HCC): ICD-10-CM

## 2020-09-14 LAB
ALBUMIN SERPL-MCNC: 4.3 G/DL (ref 3.5–5.2)
ALBUMIN/GLOB SERPL: 1.7 G/DL
ALP SERPL-CCNC: 80 U/L (ref 39–117)
ALT SERPL W P-5'-P-CCNC: 31 U/L (ref 1–33)
ANION GAP SERPL CALCULATED.3IONS-SCNC: 9.8 MMOL/L (ref 5–15)
AST SERPL-CCNC: 17 U/L (ref 1–32)
BASOPHILS # BLD AUTO: 0.05 10*3/MM3 (ref 0–0.2)
BASOPHILS NFR BLD AUTO: 0.6 % (ref 0–1.5)
BILIRUB SERPL-MCNC: 0.2 MG/DL (ref 0–1.2)
BUN SERPL-MCNC: 11 MG/DL (ref 6–20)
BUN/CREAT SERPL: 13.4 (ref 7–25)
CALCIUM SPEC-SCNC: 9.5 MG/DL (ref 8.6–10.5)
CHLORIDE SERPL-SCNC: 101 MMOL/L (ref 98–107)
CO2 SERPL-SCNC: 25.2 MMOL/L (ref 22–29)
CREAT SERPL-MCNC: 0.82 MG/DL (ref 0.57–1)
DEPRECATED RDW RBC AUTO: 44.4 FL (ref 37–54)
EOSINOPHIL # BLD AUTO: 0.18 10*3/MM3 (ref 0–0.4)
EOSINOPHIL NFR BLD AUTO: 2.2 % (ref 0.3–6.2)
ERYTHROCYTE [DISTWIDTH] IN BLOOD BY AUTOMATED COUNT: 12.9 % (ref 12.3–15.4)
FERRITIN SERPL-MCNC: 97.7 NG/ML (ref 13–150)
GFR SERPL CREATININE-BSD FRML MDRD: 74 ML/MIN/1.73
GLOBULIN UR ELPH-MCNC: 2.5 GM/DL
GLUCOSE SERPL-MCNC: 104 MG/DL (ref 65–99)
HCT VFR BLD AUTO: 40.3 % (ref 34–46.6)
HGB BLD-MCNC: 13.3 G/DL (ref 12–15.9)
IMM GRANULOCYTES # BLD AUTO: 0.02 10*3/MM3 (ref 0–0.05)
IMM GRANULOCYTES NFR BLD AUTO: 0.2 % (ref 0–0.5)
LYMPHOCYTES # BLD AUTO: 1.34 10*3/MM3 (ref 0.7–3.1)
LYMPHOCYTES NFR BLD AUTO: 16.6 % (ref 19.6–45.3)
MCH RBC QN AUTO: 30.8 PG (ref 26.6–33)
MCHC RBC AUTO-ENTMCNC: 33 G/DL (ref 31.5–35.7)
MCV RBC AUTO: 93.3 FL (ref 79–97)
MONOCYTES # BLD AUTO: 0.58 10*3/MM3 (ref 0.1–0.9)
MONOCYTES NFR BLD AUTO: 7.2 % (ref 5–12)
NEUTROPHILS NFR BLD AUTO: 5.9 10*3/MM3 (ref 1.7–7)
NEUTROPHILS NFR BLD AUTO: 73.2 % (ref 42.7–76)
NRBC BLD AUTO-RTO: 0 /100 WBC (ref 0–0.2)
PLATELET # BLD AUTO: 286 10*3/MM3 (ref 140–450)
PMV BLD AUTO: 10.7 FL (ref 6–12)
POTASSIUM SERPL-SCNC: 3.7 MMOL/L (ref 3.5–5.2)
PROT SERPL-MCNC: 6.8 G/DL (ref 6–8.5)
RBC # BLD AUTO: 4.32 10*6/MM3 (ref 3.77–5.28)
SODIUM SERPL-SCNC: 136 MMOL/L (ref 136–145)
TSH SERPL DL<=0.05 MIU/L-ACNC: 1.54 UIU/ML (ref 0.27–4.2)
WBC # BLD AUTO: 8.07 10*3/MM3 (ref 3.4–10.8)

## 2020-09-14 PROCEDURE — 85025 COMPLETE CBC W/AUTO DIFF WBC: CPT | Performed by: FAMILY MEDICINE

## 2020-09-14 PROCEDURE — 36415 COLL VENOUS BLD VENIPUNCTURE: CPT | Performed by: FAMILY MEDICINE

## 2020-09-14 PROCEDURE — 86618 LYME DISEASE ANTIBODY: CPT | Performed by: FAMILY MEDICINE

## 2020-09-14 PROCEDURE — 80074 ACUTE HEPATITIS PANEL: CPT | Performed by: FAMILY MEDICINE

## 2020-09-14 PROCEDURE — 82728 ASSAY OF FERRITIN: CPT | Performed by: FAMILY MEDICINE

## 2020-09-14 PROCEDURE — 84443 ASSAY THYROID STIM HORMONE: CPT | Performed by: FAMILY MEDICINE

## 2020-09-14 PROCEDURE — 80053 COMPREHEN METABOLIC PANEL: CPT | Performed by: FAMILY MEDICINE

## 2020-09-15 LAB
B BURGDOR IGM SER-ACNC: <0.8 INDEX (ref 0–0.79)
HAV IGM SERPL QL IA: NORMAL
HBV CORE IGM SERPL QL IA: NORMAL
HBV SURFACE AG SERPL QL IA: NORMAL
HCV AB SER DONR QL: NORMAL

## 2020-09-28 ENCOUNTER — HOSPITAL ENCOUNTER (EMERGENCY)
Facility: HOSPITAL | Age: 50
Discharge: HOME | End: 2020-09-28
Attending: EMERGENCY MEDICINE | Admitting: EMERGENCY MEDICINE

## 2020-09-28 ENCOUNTER — APPOINTMENT (OUTPATIENT)
Dept: CT IMAGING | Facility: HOSPITAL | Age: 50
End: 2020-09-28

## 2020-09-28 VITALS
BODY MASS INDEX: 26.4 KG/M2 | OXYGEN SATURATION: 98 % | HEIGHT: 67 IN | DIASTOLIC BLOOD PRESSURE: 76 MMHG | HEART RATE: 85 BPM | RESPIRATION RATE: 18 BRPM | WEIGHT: 168.21 LBS | SYSTOLIC BLOOD PRESSURE: 150 MMHG | TEMPERATURE: 98.2 F

## 2020-09-28 DIAGNOSIS — R53.1 GENERAL WEAKNESS: Primary | ICD-10-CM

## 2020-09-28 DIAGNOSIS — F41.9 ANXIETY: ICD-10-CM

## 2020-09-28 LAB
ANION GAP SERPL CALCULATED.3IONS-SCNC: 16 MMOL/L (ref 5–15)
APTT PPP: 25.4 SECONDS (ref 24–31)
BASOPHILS # BLD AUTO: 0 10*3/MM3 (ref 0–0.2)
BASOPHILS NFR BLD AUTO: 0.5 % (ref 0–1.5)
BUN SERPL-MCNC: 10 MG/DL (ref 6–20)
BUN SERPL-MCNC: ABNORMAL MG/DL
BUN/CREAT SERPL: ABNORMAL
CALCIUM SPEC-SCNC: 9.9 MG/DL (ref 8.6–10.5)
CHLORIDE SERPL-SCNC: 98 MMOL/L (ref 98–107)
CO2 SERPL-SCNC: 25 MMOL/L (ref 22–29)
CREAT SERPL-MCNC: 0.97 MG/DL (ref 0.57–1)
DEPRECATED RDW RBC AUTO: 44.6 FL (ref 37–54)
EOSINOPHIL # BLD AUTO: 0 10*3/MM3 (ref 0–0.4)
EOSINOPHIL NFR BLD AUTO: 0.1 % (ref 0.3–6.2)
ERYTHROCYTE [DISTWIDTH] IN BLOOD BY AUTOMATED COUNT: 14.1 % (ref 12.3–15.4)
GFR SERPL CREATININE-BSD FRML MDRD: 61 ML/MIN/1.73
GLUCOSE SERPL-MCNC: 117 MG/DL (ref 65–99)
HCT VFR BLD AUTO: 44.1 % (ref 34–46.6)
HGB BLD-MCNC: 15 G/DL (ref 12–15.9)
INR PPP: 0.95 (ref 0.93–1.1)
LYMPHOCYTES # BLD AUTO: 0.7 10*3/MM3 (ref 0.7–3.1)
LYMPHOCYTES NFR BLD AUTO: 12.8 % (ref 19.6–45.3)
MCH RBC QN AUTO: 31.3 PG (ref 26.6–33)
MCHC RBC AUTO-ENTMCNC: 34.1 G/DL (ref 31.5–35.7)
MCV RBC AUTO: 91.9 FL (ref 79–97)
MONOCYTES # BLD AUTO: 0.8 10*3/MM3 (ref 0.1–0.9)
MONOCYTES NFR BLD AUTO: 14.7 % (ref 5–12)
NEUTROPHILS NFR BLD AUTO: 4 10*3/MM3 (ref 1.7–7)
NEUTROPHILS NFR BLD AUTO: 71.9 % (ref 42.7–76)
NRBC BLD AUTO-RTO: 0 /100 WBC (ref 0–0.2)
PLATELET # BLD AUTO: 287 10*3/MM3 (ref 140–450)
PMV BLD AUTO: 7.4 FL (ref 6–12)
POTASSIUM SERPL-SCNC: 3.1 MMOL/L (ref 3.5–5.2)
PROTHROMBIN TIME: 10.5 SECONDS (ref 9.6–11.7)
RBC # BLD AUTO: 4.8 10*6/MM3 (ref 3.77–5.28)
SODIUM SERPL-SCNC: 139 MMOL/L (ref 136–145)
WBC # BLD AUTO: 5.5 10*3/MM3 (ref 3.4–10.8)

## 2020-09-28 PROCEDURE — 80048 BASIC METABOLIC PNL TOTAL CA: CPT | Performed by: EMERGENCY MEDICINE

## 2020-09-28 PROCEDURE — 85025 COMPLETE CBC W/AUTO DIFF WBC: CPT | Performed by: EMERGENCY MEDICINE

## 2020-09-28 PROCEDURE — 85730 THROMBOPLASTIN TIME PARTIAL: CPT | Performed by: EMERGENCY MEDICINE

## 2020-09-28 PROCEDURE — 70450 CT HEAD/BRAIN W/O DYE: CPT

## 2020-09-28 PROCEDURE — 99283 EMERGENCY DEPT VISIT LOW MDM: CPT

## 2020-09-28 PROCEDURE — 85610 PROTHROMBIN TIME: CPT | Performed by: EMERGENCY MEDICINE

## 2020-09-28 RX ORDER — SODIUM CHLORIDE 0.9 % (FLUSH) 0.9 %
10 SYRINGE (ML) INJECTION AS NEEDED
Status: DISCONTINUED | OUTPATIENT
Start: 2020-09-28 | End: 2020-09-28 | Stop reason: HOSPADM

## 2020-09-29 ENCOUNTER — EPISODE CHANGES (OUTPATIENT)
Dept: CASE MANAGEMENT | Facility: OTHER | Age: 50
End: 2020-09-29

## 2020-09-29 ENCOUNTER — OFFICE VISIT (OUTPATIENT)
Dept: FAMILY MEDICINE CLINIC | Facility: CLINIC | Age: 50
End: 2020-09-29

## 2020-09-29 ENCOUNTER — PATIENT OUTREACH (OUTPATIENT)
Dept: CASE MANAGEMENT | Facility: OTHER | Age: 50
End: 2020-09-29

## 2020-09-29 VITALS
HEART RATE: 88 BPM | SYSTOLIC BLOOD PRESSURE: 152 MMHG | DIASTOLIC BLOOD PRESSURE: 99 MMHG | TEMPERATURE: 98.5 F | OXYGEN SATURATION: 99 %

## 2020-09-29 DIAGNOSIS — J06.9 UPPER RESPIRATORY TRACT INFECTION DUE TO COVID-19 VIRUS: ICD-10-CM

## 2020-09-29 DIAGNOSIS — F41.0 PANIC ATTACK: ICD-10-CM

## 2020-09-29 DIAGNOSIS — U07.1 UPPER RESPIRATORY TRACT INFECTION DUE TO COVID-19 VIRUS: ICD-10-CM

## 2020-09-29 DIAGNOSIS — F41.9 ANXIETY: Primary | ICD-10-CM

## 2020-09-29 PROCEDURE — 99442 PR PHYS/QHP TELEPHONE EVALUATION 11-20 MIN: CPT | Performed by: FAMILY MEDICINE

## 2020-09-29 RX ORDER — LORAZEPAM 0.5 MG/1
0.5 TABLET ORAL EVERY 6 HOURS PRN
Qty: 28 TABLET | Refills: 0 | Status: SHIPPED | OUTPATIENT
Start: 2020-09-29 | End: 2020-10-26

## 2020-09-29 NOTE — PROGRESS NOTES
Subjective   Vibha Darling is a 50 y.o. female.   Chief Complaint   Patient presents with   • Anxiety       History of Present Illness   You have chosen to receive care through a telephone visit. Do you consent to use a telephone visit for your medical care today? Yes      Louisa is added on today acutely because of severely worsening anxiety.  She is being treated for metastatic melanoma and has been going up to [place] daily for radiation treatments.  She has not been working for the past several weeks.  She tested positive for COVID-19 on September 24.  She wonders if she caught it from her travels to [place].  Multiple family members have also been positive.  Her symptoms are mild and she reports that shortness of breath comes and goes.  Since her diagnosis she has been to the emergency room 4 times.  She went twice on September 27 to [facility] and twice on September 28, once to [facility] ER and the second time to [facility] ER.  It looks like the first ER visit they signed her out is dysfunctional uterine bleeding.  It is really unclear to me whether she had the results of her COVID test for that first ER visit.  She went back to [facility] ER apparently later in the day on the 27th with a complaint of coughing and chest discomfort and anxiety about COVID-19.  She was discharged home.  Apparently she had a CT scan PE protocol that was normal.  EKG was normal lab work was done including cardiac enzymes.  Apparently everything came back normal and she was discharged home.  I reviewed the first ER visit from September 28 and additional work-up was done including a CT scan of her head that was normal.  Her discharge diagnosis was anxiety.  She then went to [facility] emergency room with a complaint of being hot, headache coming and going on the right side of her head, intermittent blurry vision and slurred speech.  Work-up was done including an MRI of the  brain which was normal.  Today Vibha tells me that she feels she is having some neurologic symptoms.  Her tongue and face periodically go numb.  She gets disoriented.  She states she smells a strange smell and then her arms shake.  She also described while on her way to the hospital yesterday a feeling of impending doom.  She told me she thought she was going to die.  Indeed the ER record from [facility] indicates she told her children on the way to the hospital that she did not think she would ever come home.  As we talked about this, she explained that she was given a small dose of Ativan prior to a CAT scan and temporarily the feelings of impending doom, disorientation and the neurologic symptoms all went away.      Patient Active Problem List    Diagnosis Date Noted   • Lumbar degenerative disc disease 09/13/2020     Note Last Updated: 9/13/2020     MRI at [facility] - 7/27/20 - 1. No finding suspicious for metastatic disease  2. Lumbar spondylosis with degenerative disc disease and lateral recess stenosis at L5-S1.     • Melanoma of back (CMS/[facility]) 07/23/2020   • Melanoma (CMS/[facility]) 06/17/2020   • Abnormal electrocardiogram (ECG) (EKG) 04/22/2020   • Chest pain, atypical 04/21/2020   • Mixed hyperlipidemia 04/21/2020   • Elevated LFTs 04/21/2020   • Overweight (BMI 25.0-29.9) 04/21/2020   • Generalized anxiety disorder 04/21/2020   • Essential hypertension 05/01/2019           Past Surgical History:   Procedure Laterality Date   • CHOLECYSTECTOMY     • COLONOSCOPY     • DILATATION AND CURETTAGE     • DILATATION AND CURETTAGE  2010   • ECTOPIC PREGNANCY     • ECTOPIC PREGNANCY SURGERY     • SKIN CANCER EXCISION  07/01/2020     Current Outpatient Medications on File Prior to Visit   Medication Sig   • escitalopram (Lexapro) 10 MG tablet Take 1 tablet by mouth Daily.   • lisinopril (PRINIVIL,ZESTRIL) 2.5 MG tablet Take 1 tablet by mouth Daily.   • Multiple Vitamins-Minerals (MULTIVITAMIN ADULT PO) Take  by mouth.   •  PREVALITE 4 GM/DOSE powder 2 scoops po qd with 8 ozs in fluid of choice.   • zolpidem (AMBIEN) 5 MG tablet Take 1 tablet by mouth At Night As Needed for Sleep.   • mupirocin (BACTROBAN) 2 % ointment      Current Facility-Administered Medications on File Prior to Visit   Medication   • [DISCONTINUED] sodium chloride 0.9 % flush 10 mL     Allergies   Allergen Reactions   • Doxycycline Other (See Comments) and Rash     Flushing     • Dye Fdc Red [Red Dye] Anaphylaxis   • Latex Itching and Rash     Skin peeling   • Levofloxacin Other (See Comments) and Rash     Flushing     • Ciprofloxacin Other (See Comments)     Flushing    • Contrast Dye Itching   • Iodinated Diagnostic Agents Itching   • Penicillins Rash     Social History     Socioeconomic History   • Marital status:      Spouse name: Not on file   • Number of children: Not on file   • Years of education: Not on file   • Highest education level: Not on file   Tobacco Use   • Smoking status: Never Smoker   • Smokeless tobacco: Never Used   Substance and Sexual Activity   • Alcohol use: No     Frequency: Never   • Drug use: Never   • Sexual activity: Defer     Family History   Problem Relation Age of Onset   • Heart disease Father    • Heart disease Paternal Aunt    • Heart disease Paternal Uncle    • Thrombophilia Paternal Uncle    • Heart disease Paternal Uncle    • Alcohol abuse Paternal Uncle    • Hypertension Sister    • Hypertension Brother    • Hypertension Sister    • Hypertension Sister      The following portions of the patient's history were reviewed and updated as appropriate: allergies, current medications, past family history, past medical history, past social history, past surgical history and problem list.    Review of Systems   Constitutional: Negative for chills and fever.       Objective   /99 (BP Location: Left arm, Patient Position: Sitting, Cuff Size: Adult)   Pulse 88   Temp 98.5 °F (36.9 °C) (Oral)   SpO2 99%   Physical  Exam  Unable to do physical examination due to the telephonic nature of the visit.      Assessment/Plan   Diagnoses and all orders for this visit:    1. Anxiety (Primary)    2. Panic attack  -     LORazepam (ATIVAN) 0.5 MG tablet; Take 1 tablet by mouth Every 6 (Six) Hours As Needed for Anxiety.  Dispense: 28 tablet; Refill: 0    3. Upper respiratory tract infection due to COVID-19 virus    She has had extensive work-ups over the past 48 hours.  While it is impossible to completely rule out any unusual neurologic symptoms possibly related to SARS-CoV-2, I believe she has had an adequate work-up to rule out meningitis, stroke, MS, etc.  Clinically this history is compatible with fairly severe anxiety and panic attack.  We will do a trial of Ativan 1/2 mg up to 4 times a day.  She is still under quarantine technically for a few more days and will not be going back to work until next week.  I think this is an ideal time to see if we can manage her symptoms with an anxioytic.  She told me she was given IV steroids due to a contrast allergy before the CT PE protocol.  She question whether that had triggered some steroid psychosis.  I doubt that as the history she relays today is very similar to history provided at previous visit.  Plus, she only had 1 dose 2 days ago.  I would like to follow-up with her again next week.  Since this is a phone visit we will have office staff contact her to schedule that.      18 minutes spent on the phone with Vibha today.       No follow-ups on file.    Call with any problems or concerns before next visit

## 2020-09-29 NOTE — OUTREACH NOTE
Care Coordination Assessment    Documented/Reviewed By: Elza Carty RN Date/time: 9/29/2020  4:10 PM   Assessment completed with: patient  Enrolled in care management program: Yes  Living arrangement: spouse, children  Support system: family, children, spouse  Type of residence: apartment  Home care services: No  Equipment used at home: none  Communication device: No  Bed or wheelchair confined: No  Inadequate nutrition: No  Medication adherence problem: No  Experiencing side effects from current medications: No  History of fall(s) in last 6 months: No  Difficulty keeping appointments: No  Family aware of the patient's advance care planning wishes:  (Comment: not addressed on this call )  Yazidi or spiritual beliefs that impact treatment:  (Comment: not addressed on this call )  Chronic pain: No       Care Plan Note      Responses   Lifestyle Goals  Medication management, Decrease stress, Have more energy   Barriers  Work demands, Stress, Too busy   Self Management  Stress Management Techniques   Annual Wellness Visit:   Patient Will Schedule   Other Patient Education/Resources   24/7 [facility] Nurse Call Line   24/7 Nurse Call Line Education Method  Verbal   Does patient have depression diagnosis?  No   Advanced Directives:  Not Interested At This Time   Ed Visits past 12 months:  3 or more   Medication Adherence  Medications understood   Goal Progress  N/A   Readiness Scale  8   Confidence Scale  6   How often do you have someone help you read hospital materials?  Never   How often do you have problems learning about your medical condition because of difficulty understanding written information?  Never   How often do you have a problem understanding what is told to you about your medical condition?  Never   How confident are you filling out medical forms by yourself?  Extremely   Health Literacy  Moderate        The main concerns and/or symptoms the patient would like to address are: follow up on  s/s (neuro), anxiety, and overall health     Education/instruction provided by Care Coordinator: spoke with pt. She had just completed telehealth visit with her PCP. Reports she has had a rough past few days as far as new onset s/s of feeling like she was having a stroke or aneurysm. She was recently dx with Covid and has been ill with symptoms .  She did not feel the ER visit at [facility] resolved her issues, so she went to [facility]. She continued with the s/s today and is frustrated as many want to blame it on anxiety but she knows it's more than that. Her PCP informed her that her burning feather smells could in fact be olfactory hallucinations related to covid and olfactory migraine. and possible pseudo seizure. Reports that many of her symptoms are covid related. The plan is monitor for a few days and if no improvement - refer to neurologist. Pt just finished radiation for melanoma on back that was dx in March but due to covid - unable to be treated until June (tx at [facility]). Reports was resection by surgery and removed tumor that was 2.7mm with one lymph node. Plan is immunotherapy and pgt unsure if she will do this, she is going to give herself the month to be off treatments and think about it. currently on FMLA and quarantined. To return to work on Monday. Pt works as NP at [employer].  Reviewed with patient 24/7 Nurse Line Telephone number; ECM contact information; My Chart; ECM program. Patient verbalized understanding. No further questions or concerns voiced at this time.     Follow Up Outreach Due: 2-4 weeks    Elza Carty RN  Ambulatory     9/29/2020, 16:18 EDT

## 2020-09-30 ENCOUNTER — TELEPHONE (OUTPATIENT)
Dept: FAMILY MEDICINE CLINIC | Facility: CLINIC | Age: 50
End: 2020-09-30

## 2020-09-30 NOTE — TELEPHONE ENCOUNTER
Please call Vibha and ask her how frequently she has taken the Ativan.  It is okay to take a dose up to every 6 hours.  This may be necessary to find out if it will be effective.  I do not think another ER visit is likely to help her right now.  When I saw her in the office, we talked about getting nerve conduction tests and I ordered those.  Will you please look into when that is scheduled?  Thanks

## 2020-09-30 NOTE — TELEPHONE ENCOUNTER
Patient called wanting to know what  recommends she had a couple episodes last night, arm is jittery just doesn't know if she needs to go back to the ER or what  wants her to do please advise.

## 2020-09-30 NOTE — TELEPHONE ENCOUNTER
Spoke w/Vibha, she reports she has been cutting the Ativan in half, and she's had 3 doses of it so far. 6pm last night, 4am this morning & again 7am this morning.   On the NCV testing, they called her today to schedule her but she didn't take the call because it's not been a good day for her. She says she is going to call them back to schedule.

## 2020-10-02 ENCOUNTER — OFFICE VISIT (OUTPATIENT)
Dept: FAMILY MEDICINE CLINIC | Facility: CLINIC | Age: 50
End: 2020-10-02

## 2020-10-02 VITALS — HEART RATE: 88 BPM | TEMPERATURE: 97.9 F | OXYGEN SATURATION: 98 %

## 2020-10-02 DIAGNOSIS — J06.9 UPPER RESPIRATORY TRACT INFECTION DUE TO COVID-19 VIRUS: ICD-10-CM

## 2020-10-02 DIAGNOSIS — U07.1 UPPER RESPIRATORY TRACT INFECTION DUE TO COVID-19 VIRUS: ICD-10-CM

## 2020-10-02 DIAGNOSIS — R19.7 DIARRHEA WITH DEHYDRATION: ICD-10-CM

## 2020-10-02 DIAGNOSIS — R00.0 TACHYCARDIA: ICD-10-CM

## 2020-10-02 DIAGNOSIS — F41.1 GENERALIZED ANXIETY DISORDER: Primary | ICD-10-CM

## 2020-10-02 DIAGNOSIS — E87.6 HYPOKALEMIA DUE TO EXCESSIVE GASTROINTESTINAL LOSS OF POTASSIUM: ICD-10-CM

## 2020-10-02 PROCEDURE — 99442 PR PHYS/QHP TELEPHONE EVALUATION 11-20 MIN: CPT | Performed by: FAMILY MEDICINE

## 2020-10-02 RX ORDER — POTASSIUM CHLORIDE 750 MG/1
10 TABLET, FILM COATED, EXTENDED RELEASE ORAL DAILY
Qty: 30 TABLET | Refills: 0 | Status: SHIPPED | OUTPATIENT
Start: 2020-10-02 | End: 2020-10-26

## 2020-10-02 RX ORDER — ONDANSETRON 4 MG/1
4 TABLET, ORALLY DISINTEGRATING ORAL EVERY 8 HOURS PRN
Qty: 24 TABLET | Refills: 1 | Status: SHIPPED | OUTPATIENT
Start: 2020-10-02 | End: 2020-10-21

## 2020-10-02 NOTE — PROGRESS NOTES
Subjective   Vibha Darling is a 50 y.o. female.   Chief Complaint   Patient presents with   • Anxiety       History of Present Illness     You have chosen to receive care through a telephone visit. Do you consent to use a telephone visit for your medical care today? Yes      Vibha is reevaluated today to follow-up on a recent telephone visit we had for anxiety.  This had to be done via telemedicine because she recently has been diagnosed with COVID-19.  At that time I recommended we try extended release Xanax, but she has had a bad reaction to Xanax in the past.  I gave her 0.5 mg of lorazepam.  She was breaking those in half and continuing to experience a myriad of symptoms.  The ER doctor gave her to 0.25 mg Xanax tablets.  She took a half of 1 of those yesterday evening and felt that it controlled her anxiety better than the Ativan did.  She feels she will be able to get through the weekend using just that scant amount of Xanax.      She had a new problem and went to the [facility] ER yesterday.  She was having chest pain and apparently had a lot of diarrhea yesterday.  Less so today.  Was anxious, took an ativan.  HR at home - 130's, 140's.  Lots of muscle cramping and fatigue.  She went to the ER and was told it was anxiety.  After ER visit, went home HR back to 140.  Back to ER.   She was told her potassium was 2.7.  Temperature was 99.8 degrees yesterday.  She is now is also experiencing loss of taste and smell.  She has been able to keep food down today, but is not drinking a whole lot of water.            Patient Active Problem List    Diagnosis Date Noted   • Lumbar degenerative disc disease 09/13/2020     Note Last Updated: 9/13/2020     MRI at [facility] - 7/27/20 - 1. No finding suspicious for metastatic disease  2. Lumbar spondylosis with degenerative disc disease and lateral recess stenosis at L5-S1.     • Melanoma of back (CMS/Formerly [facility]) 07/23/2020   • Melanoma (CMS/Formerly [facility]) 06/17/2020   • Abnormal  electrocardiogram (ECG) (EKG) 04/22/2020   • Chest pain, atypical 04/21/2020   • Mixed hyperlipidemia 04/21/2020   • Elevated LFTs 04/21/2020   • Overweight (BMI 25.0-29.9) 04/21/2020   • Generalized anxiety disorder 04/21/2020   • Essential hypertension 05/01/2019           Past Surgical History:   Procedure Laterality Date   • CHOLECYSTECTOMY     • COLONOSCOPY     • DILATATION AND CURETTAGE     • DILATATION AND CURETTAGE  2010   • ECTOPIC PREGNANCY     • ECTOPIC PREGNANCY SURGERY     • SKIN CANCER EXCISION  07/01/2020     Current Outpatient Medications on File Prior to Visit   Medication Sig   • lisinopril (PRINIVIL,ZESTRIL) 2.5 MG tablet Take 1 tablet by mouth Daily.   • LORazepam (ATIVAN) 0.5 MG tablet Take 1 tablet by mouth Every 6 (Six) Hours As Needed for Anxiety.   • Multiple Vitamins-Minerals (MULTIVITAMIN ADULT PO) Take  by mouth.   • PREVALITE 4 GM/DOSE powder 2 scoops po qd with 8 ozs in fluid of choice.   • escitalopram (Lexapro) 10 MG tablet Take 1 tablet by mouth Daily.   • mupirocin (BACTROBAN) 2 % ointment    • zolpidem (AMBIEN) 5 MG tablet Take 1 tablet by mouth At Night As Needed for Sleep.     No current facility-administered medications on file prior to visit.      Allergies   Allergen Reactions   • Doxycycline Other (See Comments) and Rash     Flushing     • Dye Fdc Red [Red Dye] Anaphylaxis   • Latex Itching and Rash     Skin peeling   • Levofloxacin Other (See Comments) and Rash     Flushing     • Ciprofloxacin Other (See Comments)     Flushing    • Contrast Dye Itching   • Iodinated Diagnostic Agents Itching   • Penicillins Rash     Social History     Socioeconomic History   • Marital status:      Spouse name: Not on file   • Number of children: Not on file   • Years of education: Not on file   • Highest education level: Not on file   Tobacco Use   • Smoking status: Never Smoker   • Smokeless tobacco: Never Used   Substance and Sexual Activity   • Alcohol use: No     Frequency: Never    • Drug use: Never   • Sexual activity: Defer     Family History   Problem Relation Age of Onset   • Heart disease Father    • Heart disease Paternal Aunt    • Heart disease Paternal Uncle    • Thrombophilia Paternal Uncle    • Heart disease Paternal Uncle    • Alcohol abuse Paternal Uncle    • Hypertension Sister    • Hypertension Brother    • Hypertension Sister    • Hypertension Sister      The following portions of the patient's history were reviewed and updated as appropriate: allergies, current medications, past family history, past medical history, past social history, past surgical history and problem list.    Review of Systems   Constitutional: Positive for chills, fatigue and fever. Negative for diaphoresis.   Respiratory: Positive for chest tightness and shortness of breath.    Cardiovascular: Positive for chest pain. Negative for leg swelling.   Gastrointestinal: Positive for abdominal pain, diarrhea and nausea. Negative for blood in stool.   Musculoskeletal: Positive for arthralgias and myalgias.   Neurological: Positive for headache. Negative for syncope.       Objective   Pulse 88   Temp 97.9 °F (36.6 °C) (Oral)   SpO2 98%   Physical Exam  Unable to do physical examination due to the telephonic nature of the visit.            Assessment/Plan   Diagnoses and all orders for this visit:    1. Generalized anxiety disorder (Primary)    2. Upper respiratory tract infection due to COVID-19 virus    3. Hypokalemia due to excessive gastrointestinal loss of potassium  -     potassium chloride 10 MEQ CR tablet; Take 1 tablet by mouth Daily.  Dispense: 30 tablet; Refill: 0    4. Tachycardia    5. Diarrhea with dehydration  -     ondansetron ODT (Zofran ODT) 4 MG disintegrating tablet; Place 1 tablet on the tongue Every 8 (Eight) Hours As Needed for Nausea or Vomiting.  Dispense: 24 tablet; Refill: 1    Complicated situation.  It sure sounds like she got dehydrated and hypokalemic from diarrhea, probably from  COVID-19.  I have advised her to begin monitoring her fluid intake and try to reach 3 L of fluids per day.  We will also start her on a very low dose of potassium, 10 mEq/day, as I am not sure the single dose of 40 mEq p.o. she was given in the ER will be enough to fully replete her potassium.  I will also give her some Zofran dispersible tablets to combat any nausea to make it easier for her to keep all these fluids down.  Her tachycardia, likewise, was probably due to dehydration, clinical illness, and an element of anxiety.  She was scheduled for a stress test because of chest pains earlier this year.  It was canceled because of the pandemic.  Once she is recovered from COVID and her anxiety is a little better, will consider a cardiac work-up including a stress test and Holter monitor.  With regard to anxiety, I told her I think it is time to start the Lexapro at least within a week.  Once she is feeling better regarding COVID, she will call back and schedule an appointment for reevaluation of her anxiety and coordination of any further cardiac testing.    17 minutes spent on the phone with Vibha today.           No follow-ups on file.    Call with any problems or concerns before next visit

## 2020-10-19 ENCOUNTER — TELEPHONE (OUTPATIENT)
Dept: FAMILY MEDICINE CLINIC | Facility: CLINIC | Age: 50
End: 2020-10-19

## 2020-10-19 DIAGNOSIS — R07.89 CHEST PAIN, ATYPICAL: Primary | ICD-10-CM

## 2020-10-19 DIAGNOSIS — R00.0 TACHYCARDIA: ICD-10-CM

## 2020-10-19 NOTE — TELEPHONE ENCOUNTER
Well, the plan was to arrange cardiac testing, and I had in mind a stress test and a Holter monitor, when she was feeling better following her coronavirus infection.  Presuming that is the case and she has recovered, we can go ahead and start the process of scheduling these items.  She will need to come to the office sometime to have the Holter monitor placed.  I would recommend we do the Holter monitor for 2 weeks either before or after her stress test.  I do not think she should wear it during the stress test.  We will have to see when we can get that scheduled and make a plan accordingly.  Hopefully we will have a little more information about this by our visit on the 21st.  Thanks

## 2020-10-19 NOTE — TELEPHONE ENCOUNTER
Patient called said that  was supposed to order an Echo and a Holter monitor, she is wanting to know if he plans on still doing those, if so can orders be placed

## 2020-10-20 RX ORDER — CHOLESTYRAMINE 4 G/5.5G
POWDER, FOR SUSPENSION ORAL
Qty: 693 G | Refills: 1 | Status: SHIPPED | OUTPATIENT
Start: 2020-10-20 | End: 2021-03-26 | Stop reason: SDUPTHER

## 2020-10-20 NOTE — TELEPHONE ENCOUNTER
Date of last refill: 7-    Is there an Inspect on file: N/A    Last appt date: 10-2-2020     Next appt date: 10-      Additional information:

## 2020-10-20 NOTE — TELEPHONE ENCOUNTER
Patient wants to come in this Friday to have holter monitor placed. She wants to wait until after that to do her stress test so she has more time to recover from her infection.

## 2020-10-21 ENCOUNTER — TELEMEDICINE (OUTPATIENT)
Dept: FAMILY MEDICINE CLINIC | Facility: CLINIC | Age: 50
End: 2020-10-21

## 2020-10-21 VITALS — HEART RATE: 98 BPM | OXYGEN SATURATION: 98 %

## 2020-10-21 DIAGNOSIS — U07.1 UPPER RESPIRATORY TRACT INFECTION DUE TO COVID-19 VIRUS: ICD-10-CM

## 2020-10-21 DIAGNOSIS — R07.89 CHEST PAIN, ATYPICAL: ICD-10-CM

## 2020-10-21 DIAGNOSIS — J06.9 UPPER RESPIRATORY TRACT INFECTION DUE TO COVID-19 VIRUS: ICD-10-CM

## 2020-10-21 DIAGNOSIS — R00.0 TACHYCARDIA: ICD-10-CM

## 2020-10-21 DIAGNOSIS — F41.1 GENERALIZED ANXIETY DISORDER: Primary | ICD-10-CM

## 2020-10-21 DIAGNOSIS — I10 ESSENTIAL HYPERTENSION: Chronic | ICD-10-CM

## 2020-10-21 PROCEDURE — 99442 PR PHYS/QHP TELEPHONE EVALUATION 11-20 MIN: CPT | Performed by: FAMILY MEDICINE

## 2020-10-23 ENCOUNTER — CLINICAL SUPPORT (OUTPATIENT)
Dept: FAMILY MEDICINE CLINIC | Facility: CLINIC | Age: 50
End: 2020-10-23

## 2020-10-23 DIAGNOSIS — R00.0 TACHYCARDIA: Primary | ICD-10-CM

## 2020-10-23 PROCEDURE — 93000 ELECTROCARDIOGRAM COMPLETE: CPT | Performed by: FAMILY MEDICINE

## 2020-10-24 DIAGNOSIS — E87.6 HYPOKALEMIA DUE TO EXCESSIVE GASTROINTESTINAL LOSS OF POTASSIUM: ICD-10-CM

## 2020-10-26 ENCOUNTER — CONSULT (OUTPATIENT)
Dept: CARDIOLOGY | Facility: CLINIC | Age: 50
End: 2020-10-26

## 2020-10-26 VITALS
OXYGEN SATURATION: 97 % | HEIGHT: 67 IN | BODY MASS INDEX: 25.9 KG/M2 | DIASTOLIC BLOOD PRESSURE: 99 MMHG | WEIGHT: 165 LBS | HEART RATE: 113 BPM | SYSTOLIC BLOOD PRESSURE: 163 MMHG | RESPIRATION RATE: 18 BRPM

## 2020-10-26 DIAGNOSIS — K20.0 EOSINOPHILIC ESOPHAGITIS: ICD-10-CM

## 2020-10-26 DIAGNOSIS — R94.31 ABNORMAL ELECTROCARDIOGRAM (ECG) (EKG): ICD-10-CM

## 2020-10-26 DIAGNOSIS — C43.59 MALIGNANT MELANOMA OF TORSO EXCLUDING BREAST (HCC): ICD-10-CM

## 2020-10-26 DIAGNOSIS — I10 ESSENTIAL HYPERTENSION: Chronic | ICD-10-CM

## 2020-10-26 DIAGNOSIS — F41.1 GENERALIZED ANXIETY DISORDER: ICD-10-CM

## 2020-10-26 DIAGNOSIS — R00.0 INAPPROPRIATE SINUS TACHYCARDIA: Primary | ICD-10-CM

## 2020-10-26 DIAGNOSIS — C43.59 MELANOMA OF BACK (HCC): ICD-10-CM

## 2020-10-26 DIAGNOSIS — E78.2 MIXED HYPERLIPIDEMIA: Chronic | ICD-10-CM

## 2020-10-26 PROBLEM — I47.11 INAPPROPRIATE SINUS TACHYCARDIA: Status: ACTIVE | Noted: 2020-10-26

## 2020-10-26 PROCEDURE — 99204 OFFICE O/P NEW MOD 45 MIN: CPT | Performed by: INTERNAL MEDICINE

## 2020-10-26 RX ORDER — POTASSIUM CHLORIDE 750 MG/1
TABLET, FILM COATED, EXTENDED RELEASE ORAL
Qty: 30 TABLET | Refills: 0 | Status: SHIPPED | OUTPATIENT
Start: 2020-10-26 | End: 2020-11-20

## 2020-10-26 NOTE — PROGRESS NOTES
Cardiology Office Consultation      Encounter Date:  10/26/2020    Patient ID:   Vibha Darling is a 50 y.o. female.    Reason For Consultation:  Palpitations    History of Present Illness:  Dear Dr. Sanchez, Jazmyne Uribe MD    I had the pleasure of seeing Vibha Darling today. As you are well aware, this is a 50 y.o. female with no established history of ischemic heart disease.  She does have a history of hypertension, hyperlipidemia, anxiety, history of COVID-19 infection and palpitations.  She presents today to establish care on the above conditions.    She does report some occasional chest pressure when her heart rate gets fast.  She denies any shortness of breath.  She reports a cough that is been present since her Covid infection.  She does report some fatigue but feels this may be more in line with the radiation treatment that she received for melanoma.    She has had some issues with tachycardia and palpitations in the past.  These significantly elevated after she acquired COVID-19.  This occurred in September.  Her entire family had the illness.  She reports that she has had a great deal of anxiety following this infection.    She has had issues with severe palpitations and tachycardia.  She is going to the emergency department on a couple of different occasions with heart rates as high as the 160s.  Cursory cardiac work-up including serial biomarkers and EKGs have been negative for injury.  By in large, these have been attributed to severe episodes of anxiety.  Interestingly, she reports that these episodes occur almost exclusively after eating a meal.  They will last for about an hour.  They have become so predictable with meals, that she is lost about 20 pounds because of the fear of eating.  Also interestingly she was diagnosed incidentally with eosinophilic esophagitis about a year ago.    She is reported difficulty with sleeping.  She reports that she had to go on a benzodiazepine to help her  "sleep as she had not slept in several days.  She no longer takes the benzodiazepine but is taking an SSRI.  She has tried beta-blockers however because of the extreme fatigue that she experiences with these, she is unable to tolerate them on a regular basis.  As such she takes them as needed.  She is currently wearing a 2-week ambulatory ECG monitor as per your order.    She also was diagnosed with melanoma on her back and had radiation therapy.  She is supposed to have immunotherapy as well however treatment has been postponed while she gets some distance between her Covid recovery.        Assessment & Plan    Impressions:  Palpitations and tachycardia  Hypertension  Anxiety  Hyperlipidemia  History of COVID-19 infection  History of melanoma status post radiation therapy  History of eosinophilic esophagitis  Family history of SVT  Family history of sudden cardiac death    Recommendations:  Discontinue metoprolol  Discontinue lisinopril  Start verapamil 120 mg sustained release daily  Try an eosinophilic esophagitis diet for a few weeks to see if this placates the tachycardia  Continue with Holter  Follow through with 2D echocardiogram and nuclear stress test  Follow-up in 4 to 6 weeks        Objective:    Vitals:  Vitals:    10/26/20 1344   BP: 163/99   BP Location: Left arm   Pulse: 113   Resp: 18   SpO2: 97%   Weight: 74.8 kg (165 lb)   Height: 170.2 cm (67\")       Physical Exam:    General: Alert, cooperative, no distress, appears stated age  Head:  Normocephalic, atraumatic, mucous membranes moist  Eyes:  Conjunctiva/corneas clear, EOM's intact     Neck:  Supple,  no bruit  Lungs: Clear to auscultation bilaterally, no wheezes rhonchi rales are noted  Chest wall: No tenderness  Heart::  Regular rate and rhythm, S1 and S2 normal, 1/6 holosystolic murmur.  No rub or gallop  Abdomen: Soft, non-tender, nondistended bowel sounds active  Extremities: No cyanosis, clubbing, or edema  Pulses: 2+ and symmetric all " extremities  Skin:  No rashes or lesions  Neuro/psych: A&O x3. CN II through XII are grossly intact with appropriate affect    History of Present Illness      Allergies:  Allergies   Allergen Reactions   • Doxycycline Other (See Comments) and Rash     Flushing     • Dye Fdc Red [Red Dye] Anaphylaxis   • Latex Itching and Rash     Skin peeling   • Levofloxacin Other (See Comments) and Rash     Flushing     • Ciprofloxacin Other (See Comments)     Flushing    • Contrast Dye Itching   • Iodinated Diagnostic Agents Itching   • Penicillins Rash       Medication Review:     Current Outpatient Medications:   •  escitalopram (Lexapro) 10 MG tablet, Take 1 tablet by mouth Daily., Disp: 30 tablet, Rfl: 2  •  Multiple Vitamins-Minerals (MULTIVITAMIN ADULT PO), Take  by mouth., Disp: , Rfl:   •  potassium chloride 10 MEQ CR tablet, TAKE 1 TABLET BY MOUTH EVERY DAY, Disp: 30 tablet, Rfl: 0  •  Prevalite 4 GM/DOSE powder, DISSOLVE 2 SCOOPS IN 8OZ OF FLUID EVERY DAY, Disp: 693 g, Rfl: 1  •  verapamil SR (CALAN-SR) 120 MG CR tablet, Take 1 tablet by mouth Every Night., Disp: 30 tablet, Rfl: 6    Family History:  Family History   Problem Relation Age of Onset   • Heart disease Father    • Heart disease Paternal Aunt    • Heart disease Paternal Uncle    • Thrombophilia Paternal Uncle    • Heart disease Paternal Uncle    • Alcohol abuse Paternal Uncle    • Hypertension Sister    • Hypertension Brother    • Hypertension Sister    • Hypertension Sister        Past Medical History:  Past Medical History:   Diagnosis Date   • Anxiety    • Asthma    • Dumping syndrome    • Hyperlipidemia    • Hypertension    • Malignant melanoma (CMS/HCC) 06/2020   • Overweight (BMI 25.0-29.9) 4/21/2020       Past surgical History:  Past Surgical History:   Procedure Laterality Date   • CHOLECYSTECTOMY     • COLONOSCOPY     • DILATATION AND CURETTAGE     • DILATATION AND CURETTAGE  2010   • ECTOPIC PREGNANCY     • ECTOPIC PREGNANCY SURGERY     • SKIN  CANCER EXCISION  07/01/2020       Social History:  Social History     Socioeconomic History   • Marital status:      Spouse name: Not on file   • Number of children: Not on file   • Years of education: Not on file   • Highest education level: Not on file   Tobacco Use   • Smoking status: Never Smoker   • Smokeless tobacco: Never Used   Substance and Sexual Activity   • Alcohol use: No     Frequency: Never   • Drug use: Never   • Sexual activity: Defer       Review of Systems:  The following systems were reviewed as they relate to the cardiovascular system: Constitutional, Eyes, ENT, Cardiovascular, Respiratory, Gastrointestinal, Integumentary, Neurological, Psychiatric, Hematologic, Endocrine, Musculoskeletal, and Genitourinary. The pertinent cardiovascular findings are reported above with all other cardiovascular points within those systems being negative.    Diagnostic Study Review:     Current Electrocardiogram:  Procedures no new EKG.  EKG dated 10/1/2020 demonstrates sinus rhythm with a ventricular rate of 92 bpm.  Consider left atrial enlargement.  Left axis deviation.  Late R wave transition.  Normal QT and QTc intervals.        NOTE: The following portions of the patient's history were reviewed and updated this visit as appropriate: allergies, current medications, past family history, past medical history, past social history, past surgical history and problem list.

## 2020-10-26 NOTE — PATIENT INSTRUCTIONS
Stop Lisinopril  Start verapamil 120 mg daily  Get stress and echo that are on file  Try eosinophilic esophagitis diet for 2-3 weeks to see if that helps with episode  F/U 6 weeks

## 2020-10-28 ENCOUNTER — TELEPHONE (OUTPATIENT)
Dept: FAMILY MEDICINE CLINIC | Facility: CLINIC | Age: 50
End: 2020-10-28

## 2020-10-28 ENCOUNTER — CLINICAL SUPPORT (OUTPATIENT)
Dept: FAMILY MEDICINE CLINIC | Facility: CLINIC | Age: 50
End: 2020-10-28

## 2020-10-28 DIAGNOSIS — R31.9 HEMATURIA, UNSPECIFIED TYPE: ICD-10-CM

## 2020-10-28 DIAGNOSIS — N30.00 ACUTE CYSTITIS WITHOUT HEMATURIA: Primary | ICD-10-CM

## 2020-10-28 DIAGNOSIS — R30.0 DYSURIA: Primary | ICD-10-CM

## 2020-10-28 DIAGNOSIS — R30.0 DYSURIA: ICD-10-CM

## 2020-10-28 LAB
BILIRUB BLD-MCNC: NEGATIVE MG/DL
CLARITY, POC: CLEAR
COLOR UR: ABNORMAL
GLUCOSE UR STRIP-MCNC: NEGATIVE MG/DL
KETONES UR QL: NEGATIVE
LEUKOCYTE EST, POC: ABNORMAL
NITRITE UR-MCNC: POSITIVE MG/ML
PH UR: 6 [PH] (ref 5–8)
PROT UR STRIP-MCNC: NEGATIVE MG/DL
RBC # UR STRIP: ABNORMAL /UL
SP GR UR: 1.02 (ref 1–1.03)
UROBILINOGEN UR QL: NORMAL

## 2020-10-28 PROCEDURE — 87186 SC STD MICRODIL/AGAR DIL: CPT | Performed by: FAMILY MEDICINE

## 2020-10-28 PROCEDURE — 87086 URINE CULTURE/COLONY COUNT: CPT | Performed by: FAMILY MEDICINE

## 2020-10-28 PROCEDURE — 81003 URINALYSIS AUTO W/O SCOPE: CPT | Performed by: FAMILY MEDICINE

## 2020-10-28 PROCEDURE — 87088 URINE BACTERIA CULTURE: CPT | Performed by: FAMILY MEDICINE

## 2020-10-28 RX ORDER — SULFAMETHOXAZOLE AND TRIMETHOPRIM 800; 160 MG/1; MG/1
1 TABLET ORAL 2 TIMES DAILY
Qty: 14 TABLET | Refills: 0 | Status: SHIPPED | OUTPATIENT
Start: 2020-10-28 | End: 2020-11-04

## 2020-10-28 NOTE — TELEPHONE ENCOUNTER
Please tell Louisa that I would like her to collect a urine sample, run the dipstick UA there and send part of the sample for culture.  I have placed orders for the UA and a culture as future, so they should be viewable over there at her office.  When I see the results, will make a decision about treatment.  Thanks in the meantime, drink lots of water.

## 2020-10-28 NOTE — TELEPHONE ENCOUNTER
Patient called said she has a bad UTI and wanting to know what  would like to do, she is at work and said that she didn't know if he wanted her to run her urin or if he wanted to send something in or a telephone visit. Please advise

## 2020-10-30 LAB — BACTERIA SPEC AEROBE CULT: ABNORMAL

## 2020-11-19 DIAGNOSIS — E87.6 HYPOKALEMIA DUE TO EXCESSIVE GASTROINTESTINAL LOSS OF POTASSIUM: ICD-10-CM

## 2020-11-20 RX ORDER — POTASSIUM CHLORIDE 750 MG/1
TABLET, FILM COATED, EXTENDED RELEASE ORAL
Qty: 30 TABLET | Refills: 0 | Status: SHIPPED | OUTPATIENT
Start: 2020-11-20 | End: 2020-12-21

## 2020-11-23 ENCOUNTER — OFFICE VISIT (OUTPATIENT)
Dept: FAMILY MEDICINE CLINIC | Facility: CLINIC | Age: 50
End: 2020-11-23

## 2020-11-23 ENCOUNTER — TELEPHONE (OUTPATIENT)
Dept: FAMILY MEDICINE CLINIC | Facility: CLINIC | Age: 50
End: 2020-11-23

## 2020-11-23 VITALS — OXYGEN SATURATION: 98 % | HEART RATE: 78 BPM | TEMPERATURE: 98.1 F

## 2020-11-23 DIAGNOSIS — U07.1 COVID-19 VIRUS DETECTED: ICD-10-CM

## 2020-11-23 DIAGNOSIS — I49.3 VENTRICULAR ECTOPY: ICD-10-CM

## 2020-11-23 DIAGNOSIS — I10 ESSENTIAL HYPERTENSION: Chronic | ICD-10-CM

## 2020-11-23 DIAGNOSIS — M54.14 THORACIC RADICULOPATHY: ICD-10-CM

## 2020-11-23 DIAGNOSIS — C43.59 MELANOMA OF BACK (HCC): Primary | ICD-10-CM

## 2020-11-23 PROCEDURE — 99442 PR PHYS/QHP TELEPHONE EVALUATION 11-20 MIN: CPT | Performed by: FAMILY MEDICINE

## 2020-11-23 RX ORDER — PREGABALIN 50 MG/1
50 CAPSULE ORAL 3 TIMES DAILY
Qty: 90 CAPSULE | Refills: 1 | Status: SHIPPED | OUTPATIENT
Start: 2020-11-23 | End: 2021-01-04

## 2020-11-23 RX ORDER — AMLODIPINE BESYLATE 5 MG/1
5 TABLET ORAL DAILY
Qty: 30 TABLET | Refills: 5 | Status: SHIPPED | OUTPATIENT
Start: 2020-11-23 | End: 2021-02-08

## 2020-11-23 NOTE — TELEPHONE ENCOUNTER
Patient states she sent you a message through [employer], wanted to make sure you got it, regarding her Gabapentin.

## 2020-11-23 NOTE — PROGRESS NOTES
Subjective   Vibha Darling is a 50 y.o. female.   Chief Complaint   Patient presents with   • nerve pain       History of Present Illness     You have chosen to receive care through a telephone visit. Do you consent to use a telephone visit for your medical care today? Yes    Patient here today to discuss what she believes is nerve pain. She also states she wanted to see if you could discuss her holter monitor results with her.   Above reviewed and verified.    Louisa is evaluated today via telemedicine by her choice.  Several issues are present.    1.-She has a deep burning pain across her upper posterior back into both axilla.  Compression wrap helps this.  She completed radiation treatment for melanoma of her back 62 days ago.  This thoracic pain has been present now for about 4 weeks.  She contacted her radiation oncologist about this who felt that this probably represented some type of neuropathy.  He suggested Cymbalta or gabapentin and advised that it may take up to 6 months to recover after radiation injury.  She describes a feeling like an elephant is on her skin.  She will have discomfort even from the fabric of her chart.  The pain intensifies if she leans back in a chair.    #2-she has had multiple episodes of rapid heart rate over the past month or so.  We did a Holter monitor that showed up to 38 ventricular ectopic beats per hour.  She was evaluated by Dr. Ambrose when her heart rate was around 130.  He started her on verapamil as he suspected SVT.  She had a Holter monitor on at that time.  She states after she took the monitor off she has not had any tachycardia.  She therefore never started the verapamil.  Heart rate has been normal now for nearly a month.  The highest it has been is 94.    #3-hypertension.  She had a cough and came off of lisinopril.  The cough is gone.  Blood pressures are running in the 150s over 90s.    #4-she had COVID-19 a few months ago.  She would like to get antibody  levels done.        Patient Active Problem List    Diagnosis Date Noted   • Thoracic radiculopathy 11/02/2020   • Inappropriate sinus tachycardia 10/26/2020   • Eosinophilic esophagitis 10/26/2020   • Lumbar degenerative disc disease 09/13/2020     Note Last Updated: 9/13/2020     MRI at [facility] - 7/27/20 - 1. No finding suspicious for metastatic disease  2. Lumbar spondylosis with degenerative disc disease and lateral recess stenosis at L5-S1.     • Melanoma of back (CMS/HCC) 07/23/2020   • Melanoma (CMS/HCC) 06/17/2020   • Abnormal electrocardiogram (ECG) (EKG) 04/22/2020   • Chest pain, atypical 04/21/2020   • Mixed hyperlipidemia 04/21/2020   • Elevated LFTs 04/21/2020   • Overweight (BMI 25.0-29.9) 04/21/2020   • Generalized anxiety disorder 04/21/2020   • Essential hypertension 05/01/2019           Past Surgical History:   Procedure Laterality Date   • CHOLECYSTECTOMY     • COLONOSCOPY     • DILATATION AND CURETTAGE     • DILATATION AND CURETTAGE  2010   • ECTOPIC PREGNANCY     • ECTOPIC PREGNANCY SURGERY     • SKIN CANCER EXCISION  07/01/2020     Current Outpatient Medications on File Prior to Visit   Medication Sig   • escitalopram (Lexapro) 10 MG tablet Take 1 tablet by mouth Daily.   • Multiple Vitamins-Minerals (MULTIVITAMIN ADULT PO) Take  by mouth.   • potassium chloride 10 MEQ CR tablet TAKE 1 TABLET BY MOUTH EVERY DAY   • Prevalite 4 GM/DOSE powder DISSOLVE 2 SCOOPS IN 8OZ OF FLUID EVERY DAY   • [DISCONTINUED] verapamil SR (CALAN-SR) 120 MG CR tablet Take 1 tablet by mouth Every Night.     No current facility-administered medications on file prior to visit.      Allergies   Allergen Reactions   • Doxycycline Other (See Comments) and Rash     Flushing     • Dye Fdc Red [Red Dye] Anaphylaxis   • Latex Itching and Rash     Skin peeling   • Levofloxacin Other (See Comments) and Rash     Flushing     • Ciprofloxacin Other (See Comments)     Flushing    • Contrast Dye Itching   • Iodinated Diagnostic Agents  Itching   • Penicillins Rash     Patient states she can take penicillin-     Social History     Socioeconomic History   • Marital status:      Spouse name: Not on file   • Number of children: Not on file   • Years of education: Not on file   • Highest education level: Not on file   Tobacco Use   • Smoking status: Never Smoker   • Smokeless tobacco: Never Used   Substance and Sexual Activity   • Alcohol use: No     Frequency: Never   • Drug use: Never   • Sexual activity: Defer     Family History   Problem Relation Age of Onset   • Heart disease Father    • Heart disease Paternal Aunt    • Heart disease Paternal Uncle    • Thrombophilia Paternal Uncle    • Heart disease Paternal Uncle    • Alcohol abuse Paternal Uncle    • Hypertension Sister    • Hypertension Brother    • Hypertension Sister    • Hypertension Sister      The following portions of the patient's history were reviewed and updated as appropriate: allergies, current medications, past family history, past medical history, past social history, past surgical history and problem list.    Review of Systems   Constitutional: Positive for fatigue ( Improving).   Respiratory: Negative for cough, chest tightness and shortness of breath.    Cardiovascular: Negative for chest pain and palpitations.       Objective   Pulse 78   Temp 98.1 °F (36.7 °C) (Oral)   SpO2 98%   Physical Exam  Unable to do physical exam due to telephonic nature of the visit.  She sounds good.  She has not out of breath and she is not having any cough.    Assessment/Plan   Diagnoses and all orders for this visit:    1. Melanoma of back (CMS/HCC) (Primary)  -     pregabalin (Lyrica) 50 MG capsule; Take 1 capsule by mouth 3 (Three) Times a Day.  Dispense: 90 capsule; Refill: 1    2. Thoracic radiculopathy  -     pregabalin (Lyrica) 50 MG capsule; Take 1 capsule by mouth 3 (Three) Times a Day.  Dispense: 90 capsule; Refill: 1    3. Ventricular ectopy  -     Ambulatory Referral to  Cardiology    4. COVID-19 virus detected  -     SARS-CoV-2 Antibody, IgM; Future  -     SARS-CoV-2 Antibody, IgG ([facility]); Future  -     SARS-CoV-2 Antibody, IgA; Future    5. Essential hypertension  -     amLODIPine (NORVASC) 5 MG tablet; Take 1 tablet by mouth Daily.  Dispense: 30 tablet; Refill: 5    It certainly sounds like she is experiencing neuropathic pains, possibly of thoracic radiculopathy in nature.  I recommended we try gabapentin or possibly Lyrica.  The gabapentin indicated a cross-reactivity with red dye that initially her chart indicated she was allergic to.  Vibha is not certain about this.  Just to be safe we will go with Lyrica and have her begin 1 tablet at bedtime for a few days, then increase to 2 tablets a day for a few days and then finally try to work up to a target dose of 1 pill 3 times daily.  Anticipate she may need to take this for another 4 or 5 months.  We will wean her down at that point.    Unclear really why she had so much ventricular ectopy.  Could have been an effect of the radiation, or possibly myocarditis from COVID-19.  We will make a referral to Dr. Flores for his opinion.  Obviously if things have normalized, there may be nothing else to do.    Regarding hypertension-we will add lisinopril to her allergy list to avoid challenging her with that again in the future.  We will start amlodipine 5 mg/day.    Finally, we will go ahead and order SARS-Co.V2 antibodies per her request.  We will do my best to help interpret those for her.  Recommend following up after she has been on the full dose Lyrica for a few weeks.  Continue to follow blood pressure at home and at work and let me know if her blood pressure stays above 140/90.    18-1/2 minutes spent on the phone with Vibha today.         No follow-ups on file.    Call with any problems or concerns before next visit

## 2020-11-25 ENCOUNTER — LAB (OUTPATIENT)
Dept: FAMILY MEDICINE CLINIC | Facility: CLINIC | Age: 50
End: 2020-11-25

## 2020-11-25 DIAGNOSIS — I10 ESSENTIAL HYPERTENSION: Chronic | ICD-10-CM

## 2020-11-25 PROCEDURE — 86769 SARS-COV-2 COVID-19 ANTIBODY: CPT | Performed by: FAMILY MEDICINE

## 2020-11-25 PROCEDURE — 36415 COLL VENOUS BLD VENIPUNCTURE: CPT | Performed by: FAMILY MEDICINE

## 2020-11-25 PROCEDURE — 80048 BASIC METABOLIC PNL TOTAL CA: CPT | Performed by: INTERNAL MEDICINE

## 2020-11-26 LAB
ANION GAP SERPL CALCULATED.3IONS-SCNC: 10.6 MMOL/L (ref 5–15)
BUN SERPL-MCNC: 10 MG/DL (ref 6–20)
BUN/CREAT SERPL: 11.1 (ref 7–25)
CALCIUM SPEC-SCNC: 9.9 MG/DL (ref 8.6–10.5)
CHLORIDE SERPL-SCNC: 102 MMOL/L (ref 98–107)
CO2 SERPL-SCNC: 28.4 MMOL/L (ref 22–29)
CREAT SERPL-MCNC: 0.9 MG/DL (ref 0.57–1)
GFR SERPL CREATININE-BSD FRML MDRD: 66 ML/MIN/1.73
GLUCOSE SERPL-MCNC: 90 MG/DL (ref 65–99)
POTASSIUM SERPL-SCNC: 3.7 MMOL/L (ref 3.5–5.2)
SODIUM SERPL-SCNC: 141 MMOL/L (ref 136–145)

## 2020-11-27 LAB — SARS-COV-2 IGM SERPL QL IA: POSITIVE

## 2020-11-28 ENCOUNTER — RESULTS ENCOUNTER (OUTPATIENT)
Dept: FAMILY MEDICINE CLINIC | Facility: CLINIC | Age: 50
End: 2020-11-28

## 2020-11-28 DIAGNOSIS — U07.1 COVID-19 VIRUS DETECTED: ICD-10-CM

## 2020-11-28 LAB
SARS-COV-2 IGA SERPL QL IA: NEGATIVE
SARS-COV-2 IGG SERPL QL IA: NORMAL
SARS-COV-2 IGG SERPL QL IA: POSITIVE

## 2020-12-17 RX ORDER — ESCITALOPRAM OXALATE 10 MG/1
10 TABLET ORAL DAILY
Qty: 30 TABLET | Refills: 2 | Status: SHIPPED | OUTPATIENT
Start: 2020-12-17 | End: 2021-02-08

## 2020-12-18 ENCOUNTER — TELEPHONE (OUTPATIENT)
Dept: FAMILY MEDICINE CLINIC | Facility: CLINIC | Age: 50
End: 2020-12-18

## 2020-12-18 ENCOUNTER — HOSPITAL ENCOUNTER (OUTPATIENT)
Dept: NEUROLOGY | Facility: HOSPITAL | Age: 50
Discharge: HOME | End: 2020-12-18
Admitting: FAMILY MEDICINE

## 2020-12-18 DIAGNOSIS — M54.16 LEFT LUMBAR RADICULOPATHY: ICD-10-CM

## 2020-12-18 DIAGNOSIS — M51.36 LUMBAR DEGENERATIVE DISC DISEASE: ICD-10-CM

## 2020-12-18 PROCEDURE — 95886 MUSC TEST DONE W/N TEST COMP: CPT | Performed by: PSYCHIATRY & NEUROLOGY

## 2020-12-18 PROCEDURE — 95908 NRV CNDJ TST 3-4 STUDIES: CPT | Performed by: PSYCHIATRY & NEUROLOGY

## 2020-12-18 PROCEDURE — 95886 MUSC TEST DONE W/N TEST COMP: CPT

## 2020-12-18 PROCEDURE — 95908 NRV CNDJ TST 3-4 STUDIES: CPT

## 2020-12-18 RX ORDER — CEFDINIR 300 MG/1
300 CAPSULE ORAL 2 TIMES DAILY
Qty: 14 CAPSULE | Refills: 0 | Status: SHIPPED | OUTPATIENT
Start: 2020-12-18 | End: 2020-12-25

## 2020-12-18 NOTE — TELEPHONE ENCOUNTER
Patient called said that she is having nasal congestion and pressure in her face, she has two appointments today one at 11:00AM with ENT and has to be at physical therapy at 2:00 PM. Can an antibiotic be called in to the pharmacy for patient

## 2020-12-18 NOTE — TELEPHONE ENCOUNTER
Please tell Louisa that I have sent in a course of cefdinir to [facility] pharmacy for her.  Hope this helps.  Thanks

## 2020-12-19 DIAGNOSIS — E87.6 HYPOKALEMIA DUE TO EXCESSIVE GASTROINTESTINAL LOSS OF POTASSIUM: ICD-10-CM

## 2020-12-21 RX ORDER — POTASSIUM CHLORIDE 750 MG/1
TABLET, FILM COATED, EXTENDED RELEASE ORAL
Qty: 30 TABLET | Refills: 2 | Status: SHIPPED | OUTPATIENT
Start: 2020-12-21 | End: 2021-02-08

## 2020-12-22 RX ORDER — CYCLOBENZAPRINE HCL 5 MG
5 TABLET ORAL 3 TIMES DAILY PRN
Qty: 30 TABLET | Refills: 0 | Status: SHIPPED | OUTPATIENT
Start: 2020-12-22 | End: 2021-01-04

## 2021-01-04 ENCOUNTER — OFFICE VISIT (OUTPATIENT)
Dept: CARDIOLOGY | Facility: CLINIC | Age: 51
End: 2021-01-04

## 2021-01-04 VITALS
OXYGEN SATURATION: 100 % | BODY MASS INDEX: 25.84 KG/M2 | RESPIRATION RATE: 18 BRPM | DIASTOLIC BLOOD PRESSURE: 92 MMHG | HEIGHT: 67 IN | HEART RATE: 74 BPM | SYSTOLIC BLOOD PRESSURE: 167 MMHG

## 2021-01-04 DIAGNOSIS — E78.2 MIXED HYPERLIPIDEMIA: Primary | Chronic | ICD-10-CM

## 2021-01-04 DIAGNOSIS — F41.1 GENERALIZED ANXIETY DISORDER: ICD-10-CM

## 2021-01-04 DIAGNOSIS — I10 ESSENTIAL HYPERTENSION: Chronic | ICD-10-CM

## 2021-01-04 DIAGNOSIS — C43.59 MELANOMA OF BACK (HCC): ICD-10-CM

## 2021-01-04 PROCEDURE — 99214 OFFICE O/P EST MOD 30 MIN: CPT | Performed by: INTERNAL MEDICINE

## 2021-01-04 NOTE — PROGRESS NOTES
Cardiology Office Visit      Encounter Date:  01/04/2021    Patient ID:   Vibha Darling is a 50 y.o. female.    Reason For Followup:  Palpitations  Hypertension    Brief Clinical History:  Dear Dr. Sanchez, Jazmyne Uribe MD    I had the pleasure of seeing Vibha Darling today. As you are well aware, this is a 50 y.o. female with no established history of ischemic heart disease.  She does have a history of hypertension, hyperlipidemia, anxiety, history of COVID-19 infection and palpitations.  She presents today for follow-up on the above conditions.    Interval History:  She reports feeling much better than on her prior visit.  Her palpitations have decreased significantly.  Her blood pressure has also improved although today it is elevated due to situational hypertension.  Her fatigue is improved although she still has some residual fatigue.  Her anxiety is improved as well.    We made adjustments to her medications at her last visit.  Cessation of her lisinopril completely resolved her dry cough.  She was however unable to start verapamil due to supply chain issues.  She was started on amlodipine and reports that her resting blood pressures at home are in the 120s to 130s systolic despite her elevated reading today in the office.    We reviewed her ambulatory ECG monitor.  She has occasional PVCs that account for less than 1% of monitored QRS activity.  We reviewed her echocardiogram.  Her echocardiogram demonstrates normal LV systolic function with an ejection fraction of 60 to 65%.  She has mild tricuspid insufficiency and grade 2 diastolic dysfunction.      She has a history of melanoma on her back and had radiation therapy.  She is supposed to have immunotherapy as well however treatment has been postponed while she gets some distance between her Covid recovery.  She plans on initiating Keytruda in the next 1 to 3 months.    Assessment & Plan    Impressions:  Palpitations and  "tachycardia-improved  Hypertension with a significant situational component -improved  Anxiety  Hyperlipidemia  History of COVID-19 infection  History of melanoma status post radiation therapy  History of eosinophilic esophagitis  Family history of SVT  Family history of sudden cardiac death    Recommendations:  Continuation of her current cardiovascular regimen at the present time.  Continue to monitor blood pressure notify persistent elevation  Follow-up in 6 months time sooner should there be difficulties.    Objective:    Vitals:  Vitals:    01/04/21 1506   BP: 167/92   BP Location: Right arm   Pulse: 74   Resp: 18   SpO2: 100%   Height: 170.2 cm (67\")       Physical Exam:    General: Alert, cooperative, no distress, appears stated age  Head:  Normocephalic, atraumatic, mucous membranes moist  Eyes:  Conjunctiva/corneas clear, EOM's intact     Neck:  Supple,  no bruit  Lungs: Clear to auscultation bilaterally, no wheezes rhonchi rales are noted  Chest wall: No tenderness  Heart::  Regular rate and rhythm, S1 and S2 normal, 1/6 holosystolic murmur.  No rub or gallop  Abdomen: Soft, non-tender, nondistended bowel sounds active  Extremities: No cyanosis, clubbing, or edema  Pulses: 2+ and symmetric all extremities  Skin:  No rashes or lesions  Neuro/psych: A&O x3. CN II through XII are grossly intact with appropriate affect      Allergies:  Allergies   Allergen Reactions   • Doxycycline Other (See Comments) and Rash     Flushing     • Dye Fdc Red [Red Dye] Anaphylaxis   • Latex Itching and Rash     Skin peeling   • Levofloxacin Other (See Comments) and Rash     Flushing     • Lisinopril Cough   • Ciprofloxacin Other (See Comments)     Flushing    • Contrast Dye Itching   • Iodinated Diagnostic Agents Itching   • Penicillins Rash     Patient states she can take penicillin-       Medication Review:     Current Outpatient Medications:   •  amLODIPine (NORVASC) 5 MG tablet, Take 1 tablet by mouth Daily., Disp: 30 " tablet, Rfl: 5  •  escitalopram (Lexapro) 10 MG tablet, Take 1 tablet by mouth Daily., Disp: 30 tablet, Rfl: 2  •  Multiple Vitamins-Minerals (MULTIVITAMIN ADULT PO), Take  by mouth., Disp: , Rfl:   •  potassium chloride 10 MEQ CR tablet, TAKE 1 TABLET BY MOUTH EVERY DAY, Disp: 30 tablet, Rfl: 2  •  Prevalite 4 GM/DOSE powder, DISSOLVE 2 SCOOPS IN 8OZ OF FLUID EVERY DAY, Disp: 693 g, Rfl: 1    Family History:  Family History   Problem Relation Age of Onset   • Heart disease Father    • Supraventricular tachycardia Father    • Heart disease Paternal Aunt    • Heart disease Paternal Uncle    • Thrombophilia Paternal Uncle    • Heart disease Paternal Uncle    • Alcohol abuse Paternal Uncle    • Hypertension Sister    • Hypertension Brother    • Hypertension Sister    • Hypertension Sister        Past Medical History:  Past Medical History:   Diagnosis Date   • Anxiety    • Asthma    • Dumping syndrome    • Hyperlipidemia    • Hypertension    • Malignant melanoma (CMS/HCC) 06/2020   • Overweight (BMI 25.0-29.9) 4/21/2020       Past surgical History:  Past Surgical History:   Procedure Laterality Date   • CHOLECYSTECTOMY     • COLONOSCOPY     • DILATATION AND CURETTAGE     • DILATATION AND CURETTAGE  2010   • ECTOPIC PREGNANCY     • ECTOPIC PREGNANCY SURGERY     • SKIN CANCER EXCISION  07/01/2020       Social History:  Social History     Socioeconomic History   • Marital status:      Spouse name: Not on file   • Number of children: Not on file   • Years of education: Not on file   • Highest education level: Not on file   Tobacco Use   • Smoking status: Never Smoker   • Smokeless tobacco: Never Used   Substance and Sexual Activity   • Alcohol use: No     Frequency: Never   • Drug use: Never   • Sexual activity: Defer       Review of Systems:  The following systems were reviewed as they relate to the cardiovascular system: Constitutional, Eyes, ENT, Cardiovascular, Respiratory, Gastrointestinal, Integumentary,  Neurological, Psychiatric, Hematologic, Endocrine, Musculoskeletal, and Genitourinary. The pertinent cardiovascular findings are reported above with all other cardiovascular points within those systems being negative.    Diagnostic Study Review:     Current Electrocardiogram:  Procedures no new EKG      NOTE: The following portions of the patient's history were reviewed and updated this visit as appropriate: allergies, current medications, past family history, past medical history, past social history, past surgical history and problem list.

## 2021-02-08 ENCOUNTER — TELEPHONE (OUTPATIENT)
Dept: FAMILY MEDICINE CLINIC | Facility: CLINIC | Age: 51
End: 2021-02-08

## 2021-02-08 ENCOUNTER — TELEMEDICINE (OUTPATIENT)
Dept: FAMILY MEDICINE CLINIC | Facility: CLINIC | Age: 51
End: 2021-02-08

## 2021-02-08 VITALS — DIASTOLIC BLOOD PRESSURE: 92 MMHG | OXYGEN SATURATION: 98 % | SYSTOLIC BLOOD PRESSURE: 139 MMHG | HEART RATE: 90 BPM

## 2021-02-08 DIAGNOSIS — C43.59 MALIGNANT MELANOMA OF TORSO EXCLUDING BREAST (HCC): ICD-10-CM

## 2021-02-08 DIAGNOSIS — R07.89 OTHER CHEST PAIN: ICD-10-CM

## 2021-02-08 DIAGNOSIS — U07.1 COVID-19 VIRUS DETECTED: ICD-10-CM

## 2021-02-08 DIAGNOSIS — F41.9 ANXIETY: ICD-10-CM

## 2021-02-08 DIAGNOSIS — I26.94 MULTIPLE SUBSEGMENTAL PULMONARY EMBOLI WITHOUT ACUTE COR PULMONALE (HCC): Primary | ICD-10-CM

## 2021-02-08 PROBLEM — I26.99 PULMONARY EMBOLISM: Status: ACTIVE | Noted: 2021-02-02

## 2021-02-08 PROCEDURE — 99214 OFFICE O/P EST MOD 30 MIN: CPT | Performed by: FAMILY MEDICINE

## 2021-02-08 RX ORDER — LORAZEPAM 0.5 MG/1
0.5 TABLET ORAL EVERY 8 HOURS PRN
COMMUNITY
End: 2021-04-19

## 2021-02-08 RX ORDER — PROPRANOLOL HYDROCHLORIDE 10 MG/1
10 TABLET ORAL 3 TIMES DAILY
COMMUNITY
Start: 2021-02-03 | End: 2021-06-01

## 2021-02-08 NOTE — PROGRESS NOTES
Subjective   Vibha Darling is a 50 y.o. female.   Chief Complaint   Patient presents with   • Hospital Follow Up Visit       History of Present Illness   Louisa is evaluated today by telemedicine for hospital follow-up.  We last had a telephone visit on November 23.  Previous problems included recovery from melanoma on her back, lumbar spine degeneration, hypertension, and tachycardia.  She is also seen Dr. Ambrose who has changed some of her medications, namely adding verapamil.   Since our last visit, she has been evaluated multiple times at [facility].  She was admitted February 2 through February 3 for acute bilateral pulmonary emboli.  She was started on Xarelto, propranolol, lorazepam.  CTA of her chest showed bilateral PE in the lower lobes affecting third and fourth order branches a focal ill-defined infiltrate in the left upper lobe.  This was felt to be likely from her radiation treatment for her melanoma.  With regard to the anxiety she recently stopped her Lexapro and had been started on citalopram 10 mg plus as needed Ativan.  They felt her hypertension was exacerbated by anxiety.  She went back to the emergency department on the fourth.  She reported she awoke on the morning of evaluation and had right-sided facial numbness and she was off balance when she stood up.  She reports she became confused and had difficulty concentrating.  No headache.  CT of her head showed no acute findings.  MRI of her lumbar spine showed mild central stenosis at L4-L5 and L5-S1.  Bilateral recess encroachment right greater than left at L5-1.  Moderate bilateral foraminal encroachment at L5-S1.  It looks like they subsequently followed up with an MRI of her brain which showed no acute intracerebral pathology no evidence of metastatic disease or acute infarct.  There is mild periventricular white matter signal changes stable and unchanged from prior study likely reflecting mild chronic white matter  ischemic change and or gliosis.  She was discharged home with a plan to follow-up with her oncologist the same day.  Notes indicate she was advised that her work-up was unremarkable and there was no evidence of tumors in her spinal cord or in her brain and there is no evidence of a stroke.  They suspected this could be medication reaction her related to anxiety.    Was only d/c 5 days ago and has been back to ER since then as above.    She has transferred her oncology care to Dr. Lovell in [facility].  She reports that about 2 weeks ago she had a PET/CT which showed inflammation in her lung.  This was felt to possibly be residual from Covid.  She was given Decadron.  She had her 1st immunotherapy.  She developed extreme fatigue for several days.  She then developed severe chest pain.  Her heart rate went up to 130.  She went to the ER and was found to have a pulmonary embolism.  She was admitted overnight.  They were not able to attribute a specific cause but thinks maybe the PE was due to a combination of post Covid, prior radiation and her immunotherapy possibly.  She has decided to postpone any other immunotherapy for a while.  Since being home she has still had pressure in her chest.  She is extremely fatigued.  Heart rate will go up to 110 with simple activities.  She has a pressure in bilateral upper chest whenever she stands up.  She has talked to her other doctors about this.  She has had some version of chest pain for 3 months.  She has been referred to physical therapy and that has not helped the discomfort.    Because of her treatments, and these complications, she has not been to work since January 29.  She is scheduled to return Thursday, but does not think she will be able to with her current chest pain, lack of stamina, extreme fatigue.  She is not having any fevers, chills, sweats.      Patient Active Problem List    Diagnosis Date Noted   • Anxiety 02/03/2021   • Pulmonary embolism (CMS/HCC)  02/02/2021     Note Last Updated: 2/8/2021     Bilateral - 2/2/21     • Thoracic radiculopathy 11/02/2020   • Inappropriate sinus tachycardia 10/26/2020   • Eosinophilic esophagitis 10/26/2020   • Lumbar degenerative disc disease 09/13/2020     Note Last Updated: 9/13/2020     [facility] - 7/27/20 - 1. No finding suspicious for metastatic disease  2. Lumbar spondylosis with degenerative disc disease and lateral recess stenosis at L5-S1.     • Melanoma of back (CMS/HCC) 07/23/2020   • Melanoma (CMS/HCC) 06/17/2020   • Abnormal electrocardiogram (ECG) (EKG) 04/22/2020   • Chest pain, atypical 04/21/2020   • Mixed hyperlipidemia 04/21/2020   • Elevated LFTs 04/21/2020   • Overweight (BMI 25.0-29.9) 04/21/2020   • Generalized anxiety disorder 04/21/2020   • Essential hypertension 05/01/2019           Past Surgical History:   Procedure Laterality Date   • CHOLECYSTECTOMY     • COLONOSCOPY     • DILATATION AND CURETTAGE     • DILATATION AND CURETTAGE  2010   • ECTOPIC PREGNANCY     • ECTOPIC PREGNANCY SURGERY     • SKIN CANCER EXCISION  07/01/2020     Current Outpatient Medications on File Prior to Visit   Medication Sig   • LORazepam (ATIVAN) 0.5 MG tablet Take 0.5 mg by mouth Every 8 (Eight) Hours As Needed for Anxiety (TID PRN). 0.25 tid prn   • Prevalite 4 GM/DOSE powder DISSOLVE 2 SCOOPS IN 8OZ OF FLUID EVERY DAY   • propranolol (INDERAL) 10 MG tablet Take 10 mg by mouth 3 (Three) Times a Day.   • rivaroxaban (XARELTO) 15 MG tablet Take 15 mg by mouth 2 (two) times a day.   • [DISCONTINUED] amLODIPine (NORVASC) 5 MG tablet Take 1 tablet by mouth Daily.   • [DISCONTINUED] escitalopram (Lexapro) 10 MG tablet Take 1 tablet by mouth Daily.   • [DISCONTINUED] Multiple Vitamins-Minerals (MULTIVITAMIN ADULT PO) Take  by mouth.   • [DISCONTINUED] potassium chloride 10 MEQ CR tablet TAKE 1 TABLET BY MOUTH EVERY DAY     No current facility-administered medications on file prior to visit.      Allergies   Allergen Reactions   •  Doxycycline Other (See Comments) and Rash     Flushing     • Dye Fdc Red [Red Dye] Anaphylaxis   • Latex Itching and Rash     Skin peeling   • Levofloxacin Other (See Comments) and Rash     Flushing     • Lisinopril Cough   • Ciprofloxacin Other (See Comments)     Flushing    • Contrast Dye Itching   • Iodinated Diagnostic Agents Itching   • Penicillins Rash     Patient states she can take penicillin-     Social History     Socioeconomic History   • Marital status:      Spouse name: Not on file   • Number of children: Not on file   • Years of education: Not on file   • Highest education level: Not on file   Tobacco Use   • Smoking status: Never Smoker   • Smokeless tobacco: Never Used   Substance and Sexual Activity   • Alcohol use: No     Frequency: Never   • Drug use: Never   • Sexual activity: Defer     Family History   Problem Relation Age of Onset   • Heart disease Father    • Supraventricular tachycardia Father    • Heart disease Paternal Aunt    • Heart disease Paternal Uncle    • Thrombophilia Paternal Uncle    • Heart disease Paternal Uncle    • Alcohol abuse Paternal Uncle    • Hypertension Sister    • Hypertension Brother    • Hypertension Sister    • Hypertension Sister        Review of Systems    Objective   /92 (BP Location: Right arm, Patient Position: Sitting, Cuff Size: Adult)   Pulse 90   SpO2 98%   Physical Exam  Constitutional:       General: She is not in acute distress.     Appearance: She is well-developed.   HENT:      Head: Normocephalic and atraumatic.   Pulmonary:      Effort: No accessory muscle usage or respiratory distress.   Neurological:      Mental Status: She is alert and oriented to person, place, and time.   Psychiatric:         Speech: Speech normal.       Assessment/Plan   Diagnoses and all orders for this visit:    1. Multiple subsegmental pulmonary emboli without acute cor pulmonale (CMS/HCC) (Primary)    2. Malignant melanoma of torso excluding breast  (CMS/HCC)    3. Anxiety    4. COVID-19 virus detected    5. Other chest pain    Very complicated situation.  She is recovering from surgical treatment for melanoma last summer.  She is now undergoing immunotherapy following a course of radiation therapy.  She caught COVID-19 back in late September.  It took her a while to recover from that.  She has episodes of back pain attributable to radiographically identified features on MRI of her back.  She now has bilateral pulmonary emboli and is having severe symptoms of fatigue, chest pains, palpitations.  I do not think she should go back to work just yet.  Her cardiologist is not sure why she is having chest pains.  I am not entirely sure just how long she may have had these pulmonary emboli before their diagnosis.  She sees her hematologist/oncologist on Monday.  I have advised her to discuss the suspected origins of her pulmonary embolism with her.  We will attempt to get Vibha back to work next Tuesday, February 16.  I have written a note to this effect and have placed it in Vibha's MyChart.      Will be available to follow up if she's not able to return to work.     Keep follow up with all specialists per their instructions         Return if symptoms worsen or fail to improve.    Call with any problems or concerns before next visit

## 2021-02-08 NOTE — TELEPHONE ENCOUNTER
Caller: Vibha Darling    Relationship to patient: Self    Best call back number: [phone]    Patient is needing: PATIENT CALLED TO SCHEDULE A MYCHART APPT FOR HOSPITAL FOLLOW UP BUT NOTHING AVAILABLE. PATIENT ADVISED THAT SHE IS NOT MOBILE. SHE IS UNABLE TO WALK AT THIS TIME. PATIENT IS ASKING DR DUMONT CALL HER. PLEASE ADVISE

## 2021-02-12 ENCOUNTER — TELEPHONE (OUTPATIENT)
Dept: FAMILY MEDICINE CLINIC | Facility: CLINIC | Age: 51
End: 2021-02-12

## 2021-02-12 NOTE — TELEPHONE ENCOUNTER
Patient calling stating that she can feel her heartbeat in her ears. She has pressure also in the back of her head. She states her soa is worse, she got in the car to go to the ED but she wants confirmation that is what you recommend her do. Her hr was 110-120 but she states now it is around 106. Please advise

## 2021-02-17 PROBLEM — C43.59 MALIGNANT MELANOMA OF TORSO EXCLUDING BREAST (HCC): Status: ACTIVE | Noted: 2021-01-21

## 2021-03-01 ENCOUNTER — PATIENT MESSAGE (OUTPATIENT)
Dept: FAMILY MEDICINE CLINIC | Facility: CLINIC | Age: 51
End: 2021-03-01

## 2021-03-01 NOTE — TELEPHONE ENCOUNTER
From: Vibha Darling  To: Jazmyne Sanchez MD  Sent: 3/1/2021 8:51 AM EST  Subject: Prescription Question    Hi, I just added my Xarelto 20 mg daily can I get a refill for this?  Thank you so much,  Vibha Darling

## 2021-03-22 ENCOUNTER — OFFICE VISIT (OUTPATIENT)
Dept: FAMILY MEDICINE CLINIC | Facility: CLINIC | Age: 51
End: 2021-03-22

## 2021-03-22 VITALS
BODY MASS INDEX: 25.84 KG/M2 | HEART RATE: 78 BPM | HEIGHT: 67 IN | SYSTOLIC BLOOD PRESSURE: 138 MMHG | DIASTOLIC BLOOD PRESSURE: 92 MMHG | OXYGEN SATURATION: 98 % | TEMPERATURE: 97.1 F

## 2021-03-22 DIAGNOSIS — I10 ESSENTIAL HYPERTENSION: ICD-10-CM

## 2021-03-22 DIAGNOSIS — R42 DIZZINESS: Primary | ICD-10-CM

## 2021-03-22 DIAGNOSIS — U07.1 COVID-19 VIRUS DETECTED: ICD-10-CM

## 2021-03-22 DIAGNOSIS — G44.59 OTHER COMPLICATED HEADACHE SYNDROME: ICD-10-CM

## 2021-03-22 PROCEDURE — 99442 PR PHYS/QHP TELEPHONE EVALUATION 11-20 MIN: CPT | Performed by: FAMILY MEDICINE

## 2021-03-22 RX ORDER — MECLIZINE HYDROCHLORIDE 25 MG/1
25 TABLET ORAL 3 TIMES DAILY PRN
Qty: 30 TABLET | Refills: 0 | Status: SHIPPED | OUTPATIENT
Start: 2021-03-22 | End: 2021-09-24

## 2021-03-22 NOTE — PROGRESS NOTES
Subjective   Vibha Darling is a 50 y.o. female.   Chief Complaint   Patient presents with   • Dizziness       History of Present Illness   Patient states she has been experienced dizziness that started 3 days ago. You have chosen to receive care through a telephone visit. Do you consent to use a telephone visit for your medical care today? Yes    Louisa explains by phone that she has been dizzy for 3 days.  She feels a pressure in the right side of her face the takes up the whole right side of her head.  The dizziness has been 24 hours a day.  She is been having her daughter drive her to work.   She describes the dizziness as spinning.  She states her vital signs are fine.  Heart rate has been around 70s.  She did have problems with tachycardia early on.  She continues to have fairly significant shortness of breath with exertion.  She has been this way since she was diagnosed with Covid.  To complicate that she had a pulmonary embolism on February 2.  She states she is having severe anxiety as well.  She is afraid that this dizziness and pressure in the right side of her face is a stroke.  She has had an MRI recently which did not demonstrate a stroke.  She tells me that the shortness of breath she is experiencing is really no worse than it has been 4 months.          Patient Active Problem List    Diagnosis Date Noted   • Anxiety 02/03/2021   • Pulmonary embolism (CMS/HCC) 02/02/2021     Note Last Updated: 2/8/2021     Bilateral - 2/2/21     • Malignant melanoma of torso excluding breast (CMS/HCC) 01/21/2021   • Thoracic radiculopathy 11/02/2020   • Inappropriate sinus tachycardia 10/26/2020   • Eosinophilic esophagitis 10/26/2020   • Lumbar degenerative disc disease 09/13/2020     Note Last Updated: 9/13/2020     MRI at [facility] - 7/27/20 - 1. No finding suspicious for metastatic disease  2. Lumbar spondylosis with degenerative disc disease and lateral recess stenosis at L5-S1.     • Melanoma of back (CMS/HCC)  07/23/2020   • Melanoma (CMS/HCC) 06/17/2020   • Abnormal electrocardiogram (ECG) (EKG) 04/22/2020   • Chest pain, atypical 04/21/2020   • Mixed hyperlipidemia 04/21/2020   • Elevated LFTs 04/21/2020   • Overweight (BMI 25.0-29.9) 04/21/2020   • Generalized anxiety disorder 04/21/2020   • Essential hypertension 05/01/2019           Past Surgical History:   Procedure Laterality Date   • CHOLECYSTECTOMY     • COLONOSCOPY     • DILATATION AND CURETTAGE     • DILATATION AND CURETTAGE  2010   • ECTOPIC PREGNANCY     • ECTOPIC PREGNANCY SURGERY     • SKIN CANCER EXCISION  07/01/2020     Current Outpatient Medications on File Prior to Visit   Medication Sig   • LORazepam (ATIVAN) 0.5 MG tablet Take 0.5 mg by mouth Every 8 (Eight) Hours As Needed for Anxiety (TID PRN). 0.25 tid prn   • Prevalite 4 GM/DOSE powder DISSOLVE 2 SCOOPS IN 8OZ OF FLUID EVERY DAY   • rivaroxaban (XARELTO) 20 MG tablet Take 1 tablet by mouth Daily.   • propranolol (INDERAL) 10 MG tablet Take 10 mg by mouth 3 (Three) Times a Day.     No current facility-administered medications on file prior to visit.     Allergies   Allergen Reactions   • Doxycycline Other (See Comments) and Rash     Flushing     • Dye Fdc Red [Red Dye] Anaphylaxis   • Latex Itching and Rash     Skin peeling   • Levofloxacin Other (See Comments) and Rash     Flushing     • Lisinopril Cough   • Ciprofloxacin Other (See Comments)     Flushing    • Contrast Dye Itching   • Iodinated Diagnostic Agents Itching   • Penicillins Rash     Patient states she can take penicillin-     Social History     Socioeconomic History   • Marital status:      Spouse name: Not on file   • Number of children: Not on file   • Years of education: Not on file   • Highest education level: Not on file   Tobacco Use   • Smoking status: Never Smoker   • Smokeless tobacco: Never Used   Substance and Sexual Activity   • Alcohol use: No   • Drug use: Never   • Sexual activity: Defer     Family History  "  Problem Relation Age of Onset   • Heart disease Father    • Supraventricular tachycardia Father    • Heart disease Paternal Aunt    • Heart disease Paternal Uncle    • Thrombophilia Paternal Uncle    • Heart disease Paternal Uncle    • Alcohol abuse Paternal Uncle    • Hypertension Sister    • Hypertension Brother    • Hypertension Sister    • Hypertension Sister        Review of Systems    Objective   /92 (BP Location: Right arm, Patient Position: Sitting, Cuff Size: Adult)   Pulse 78   Temp 97.1 °F (36.2 °C) (Infrared)   Ht 170.2 cm (67.01\")   SpO2 98%   BMI 25.84 kg/m²   Physical Exam  Unable to do physical examination due to the telephonic nature of the visit.  She was talking on her cell phone and would have to put down the phone when she would walk from room to room before she could begin to speak again.      Ancillary Procedure on 12/17/2020   Component Date Value Ref Range Status   • Target HR (85%) 12/17/2020 145  bpm Final   • Max. Pred. HR (100%) 12/17/2020 170  bpm Final   Results Encounter on 11/28/2020   Component Date Value Ref Range Status   • SARS-COV-2 ANTIBODY, IGM 11/25/2020 Positive* Negative Final    Results suggest recent or prior infection with SARS-CoV-2. Correlation  with epidemiologic risk factors and other clinical and laboratory  findings is recommended. Serologic results should not be used as the  sole basis to diagnose or exclude recent SARS-CoV-2 infection. False  positive results infrequently occur due to prior infection with other  human Coronaviruses.   • SARS-COV-2 ANTIBODY, IGG 11/25/2020 Comment  Negative Final    See DiaSorin SARS-CoV-2 Ab, IgG   • SARS-COV-2 ANTIBODY, IGA 11/25/2020 Negative  Negative Final    This sample does not contain detectable SARS-CoV-2 IgA antibodies.  This negative result does not rule out SARS-CoV-2 infection.  Correlation with epidemiologic risk factors and other clinical and  laboratory findings is recommended. Serologic results should " not be  used as the sole basis to diagnose or exclude recent SARS-CoV-2  infection.   • DIASORIN SARS-COV-2 AB, IGG 11/25/2020 Positive* Negative Final    Results suggest recent or prior infection with SARS-CoV-2. Correlation  with epidemiologic risk factors and other clinical and laboratory  findings is recommended. Serologic results should not be used as the  sole basis to diagnose or exclude recent SARS-CoV-2 infection. False  positive results infrequently occur due to prior infection with other  human Coronaviruses.  This assay was performed using the [employer]R)  SARS-CoV-2 S1/S2 IgG assay.   Lab on 11/25/2020   Component Date Value Ref Range Status   • Glucose 11/25/2020 90  65 - 99 mg/dL Final   • BUN 11/25/2020 10  6 - 20 mg/dL Final   • Creatinine 11/25/2020 0.90  0.57 - 1.00 mg/dL Final   • Sodium 11/25/2020 141  136 - 145 mmol/L Final   • Potassium 11/25/2020 3.7  3.5 - 5.2 mmol/L Final   • Chloride 11/25/2020 102  98 - 107 mmol/L Final   • CO2 11/25/2020 28.4  22.0 - 29.0 mmol/L Final   • Calcium 11/25/2020 9.9  8.6 - 10.5 mg/dL Final   • eGFR Non African Amer 11/25/2020 66  >60 mL/min/1.73 Final   • BUN/Creatinine Ratio 11/25/2020 11.1  7.0 - 25.0 Final   • Anion Gap 11/25/2020 10.6  5.0 - 15.0 mmol/L Final         Assessment/Plan   Diagnoses and all orders for this visit:    1. Dizziness (Primary)  -     meclizine (ANTIVERT) 25 MG tablet; Take 1 tablet by mouth 3 (Three) Times a Day As Needed for Dizziness.  Dispense: 30 tablet; Refill: 0  -     TSH; Future    2. Other complicated headache syndrome  -     CT Head Without Contrast; Future  -     CT Head Without Contrast    3. COVID-19 virus detected  -     SARS-CoV-2 Antibody, IgM; Future  -     SARS-CoV-2 Antibody, IgA; Future  -     SARS-CoV-2 Antibody, IgG (Abbott); Future    4. Essential hypertension  -     CBC & Differential; Future  -     Comprehensive Metabolic Panel; Future  -     TSH; Future    Her dizziness is somewhat rotational.  We  will try some meclizine 3 times daily to see if this is able to calm it down.  We will go ahead and get a CT of her brain to rule out stroke since she is now on a blood thinner.  We will get some labs as above including Covid antibody titers.  She would like to get that done for the peace of mind of knowing whether she still has antibodies.  I will follow-up with her as soon as results are available.  I really will have to see her in the office to make any more specific estimations as to what could be causing some more any of the symptoms.    Overall, 15 minutes spent on the phone with Vibha today.         No follow-ups on file.   We will arrange next follow-up visit depending upon the results of her tests.    Call with any problems or concerns before next visit

## 2021-03-23 ENCOUNTER — LAB (OUTPATIENT)
Dept: FAMILY MEDICINE CLINIC | Facility: CLINIC | Age: 51
End: 2021-03-23

## 2021-03-23 DIAGNOSIS — R76.8 ELEVATED ANTINUCLEAR ANTIBODY (ANA) LEVEL: Primary | ICD-10-CM

## 2021-03-23 DIAGNOSIS — I10 ESSENTIAL HYPERTENSION: ICD-10-CM

## 2021-03-23 DIAGNOSIS — R76.8 ELEVATED ANTINUCLEAR ANTIBODY (ANA) LEVEL: ICD-10-CM

## 2021-03-23 DIAGNOSIS — U07.1 COVID-19 VIRUS DETECTED: ICD-10-CM

## 2021-03-23 DIAGNOSIS — E78.2 MIXED HYPERLIPIDEMIA: ICD-10-CM

## 2021-03-23 DIAGNOSIS — R79.89 ELEVATED LFTS: ICD-10-CM

## 2021-03-23 PROCEDURE — 86769 SARS-COV-2 COVID-19 ANTIBODY: CPT | Performed by: FAMILY MEDICINE

## 2021-03-23 PROCEDURE — 36415 COLL VENOUS BLD VENIPUNCTURE: CPT

## 2021-03-23 PROCEDURE — 80053 COMPREHEN METABOLIC PANEL: CPT | Performed by: FAMILY MEDICINE

## 2021-03-23 PROCEDURE — 85025 COMPLETE CBC W/AUTO DIFF WBC: CPT | Performed by: FAMILY MEDICINE

## 2021-03-23 PROCEDURE — 86038 ANTINUCLEAR ANTIBODIES: CPT | Performed by: FAMILY MEDICINE

## 2021-03-23 PROCEDURE — 84443 ASSAY THYROID STIM HORMONE: CPT | Performed by: FAMILY MEDICINE

## 2021-03-23 PROCEDURE — 36415 COLL VENOUS BLD VENIPUNCTURE: CPT | Performed by: FAMILY MEDICINE

## 2021-03-24 LAB
ALBUMIN SERPL-MCNC: 4.6 G/DL (ref 3.5–5.2)
ALBUMIN/GLOB SERPL: 1.9 G/DL
ALP SERPL-CCNC: 70 U/L (ref 39–117)
ALT SERPL W P-5'-P-CCNC: 27 U/L (ref 1–33)
ANA SER QL: NEGATIVE
ANION GAP SERPL CALCULATED.3IONS-SCNC: 14.5 MMOL/L (ref 5–15)
AST SERPL-CCNC: 16 U/L (ref 1–32)
BASOPHILS # BLD AUTO: 0.06 10*3/MM3 (ref 0–0.2)
BASOPHILS NFR BLD AUTO: 0.8 % (ref 0–1.5)
BILIRUB SERPL-MCNC: 0.3 MG/DL (ref 0–1.2)
BUN SERPL-MCNC: 8 MG/DL (ref 6–20)
BUN/CREAT SERPL: 11.4 (ref 7–25)
CALCIUM SPEC-SCNC: 9.8 MG/DL (ref 8.6–10.5)
CHLORIDE SERPL-SCNC: 100 MMOL/L (ref 98–107)
CO2 SERPL-SCNC: 28.5 MMOL/L (ref 22–29)
CREAT SERPL-MCNC: 0.7 MG/DL (ref 0.57–1)
DEPRECATED RDW RBC AUTO: 45 FL (ref 37–54)
EOSINOPHIL # BLD AUTO: 0.1 10*3/MM3 (ref 0–0.4)
EOSINOPHIL NFR BLD AUTO: 1.4 % (ref 0.3–6.2)
ERYTHROCYTE [DISTWIDTH] IN BLOOD BY AUTOMATED COUNT: 13 % (ref 12.3–15.4)
GFR SERPL CREATININE-BSD FRML MDRD: 89 ML/MIN/1.73
GLOBULIN UR ELPH-MCNC: 2.4 GM/DL
GLUCOSE SERPL-MCNC: 100 MG/DL (ref 65–99)
HCT VFR BLD AUTO: 42.3 % (ref 34–46.6)
HGB BLD-MCNC: 14.4 G/DL (ref 12–15.9)
IMM GRANULOCYTES # BLD AUTO: 0.01 10*3/MM3 (ref 0–0.05)
IMM GRANULOCYTES NFR BLD AUTO: 0.1 % (ref 0–0.5)
LYMPHOCYTES # BLD AUTO: 1.38 10*3/MM3 (ref 0.7–3.1)
LYMPHOCYTES NFR BLD AUTO: 19.4 % (ref 19.6–45.3)
MCH RBC QN AUTO: 32.4 PG (ref 26.6–33)
MCHC RBC AUTO-ENTMCNC: 34 G/DL (ref 31.5–35.7)
MCV RBC AUTO: 95.1 FL (ref 79–97)
MONOCYTES # BLD AUTO: 0.46 10*3/MM3 (ref 0.1–0.9)
MONOCYTES NFR BLD AUTO: 6.5 % (ref 5–12)
NEUTROPHILS NFR BLD AUTO: 5.12 10*3/MM3 (ref 1.7–7)
NEUTROPHILS NFR BLD AUTO: 71.8 % (ref 42.7–76)
NRBC BLD AUTO-RTO: 0 /100 WBC (ref 0–0.2)
PLATELET # BLD AUTO: 368 10*3/MM3 (ref 140–450)
PMV BLD AUTO: 10 FL (ref 6–12)
POTASSIUM SERPL-SCNC: 3.6 MMOL/L (ref 3.5–5.2)
PROT SERPL-MCNC: 7 G/DL (ref 6–8.5)
RBC # BLD AUTO: 4.45 10*6/MM3 (ref 3.77–5.28)
SODIUM SERPL-SCNC: 143 MMOL/L (ref 136–145)
TSH SERPL DL<=0.05 MIU/L-ACNC: 0.98 UIU/ML (ref 0.27–4.2)
WBC # BLD AUTO: 7.13 10*3/MM3 (ref 3.4–10.8)

## 2021-03-25 LAB — SARS-COV-2 IGA SERPL QL IA: NEGATIVE

## 2021-03-29 RX ORDER — CHOLESTYRAMINE 4 G/5.5G
POWDER, FOR SUSPENSION ORAL
Qty: 693 G | Refills: 1 | Status: SHIPPED | OUTPATIENT
Start: 2021-03-29 | End: 2021-04-23

## 2021-03-29 NOTE — TELEPHONE ENCOUNTER
Date of last refill: 10-    Is there an Inspect on file: N/A     Last appt date: 3-     Next appt date: NONE      Additional information:

## 2021-04-19 ENCOUNTER — OFFICE VISIT (OUTPATIENT)
Dept: FAMILY MEDICINE CLINIC | Facility: CLINIC | Age: 51
End: 2021-04-19

## 2021-04-19 VITALS
OXYGEN SATURATION: 96 % | WEIGHT: 156.6 LBS | BODY MASS INDEX: 24.58 KG/M2 | SYSTOLIC BLOOD PRESSURE: 151 MMHG | DIASTOLIC BLOOD PRESSURE: 94 MMHG | HEIGHT: 67 IN | TEMPERATURE: 97.8 F | HEART RATE: 90 BPM

## 2021-04-19 DIAGNOSIS — R30.0 BURNING WITH URINATION: Primary | ICD-10-CM

## 2021-04-19 DIAGNOSIS — E78.2 MIXED HYPERLIPIDEMIA: Chronic | ICD-10-CM

## 2021-04-19 DIAGNOSIS — I26.94 MULTIPLE SUBSEGMENTAL PULMONARY EMBOLI WITHOUT ACUTE COR PULMONALE (HCC): ICD-10-CM

## 2021-04-19 DIAGNOSIS — I10 ESSENTIAL HYPERTENSION: Chronic | ICD-10-CM

## 2021-04-19 DIAGNOSIS — M54.14 THORACIC RADICULOPATHY: ICD-10-CM

## 2021-04-19 DIAGNOSIS — M50.30 DDD (DEGENERATIVE DISC DISEASE), CERVICAL: ICD-10-CM

## 2021-04-19 LAB
BILIRUB BLD-MCNC: NEGATIVE MG/DL
CLARITY, POC: CLEAR
COLOR UR: YELLOW
GLUCOSE UR STRIP-MCNC: NEGATIVE MG/DL
KETONES UR QL: NEGATIVE
LEUKOCYTE EST, POC: NEGATIVE
NITRITE UR-MCNC: POSITIVE MG/ML
PH UR: 5 [PH] (ref 5–8)
PROT UR STRIP-MCNC: NEGATIVE MG/DL
RBC # UR STRIP: ABNORMAL /UL
SP GR UR: 1.02 (ref 1–1.03)
UROBILINOGEN UR QL: NORMAL

## 2021-04-19 PROCEDURE — 81003 URINALYSIS AUTO W/O SCOPE: CPT | Performed by: FAMILY MEDICINE

## 2021-04-19 PROCEDURE — 99214 OFFICE O/P EST MOD 30 MIN: CPT | Performed by: FAMILY MEDICINE

## 2021-04-19 RX ORDER — LOSARTAN POTASSIUM 25 MG/1
25 TABLET ORAL DAILY
COMMUNITY
End: 2021-06-01

## 2021-04-19 RX ORDER — SULFAMETHOXAZOLE AND TRIMETHOPRIM 800; 160 MG/1; MG/1
1 TABLET ORAL 2 TIMES DAILY
Qty: 14 TABLET | Refills: 0 | Status: SHIPPED | OUTPATIENT
Start: 2021-04-19 | End: 2021-04-26

## 2021-04-19 RX ORDER — ALPRAZOLAM 0.25 MG/1
0.25 TABLET ORAL 2 TIMES DAILY PRN
COMMUNITY
End: 2021-05-25 | Stop reason: SDUPTHER

## 2021-04-19 RX ORDER — CITALOPRAM 10 MG/1
5 TABLET ORAL DAILY
COMMUNITY
End: 2021-06-21

## 2021-04-19 NOTE — PROGRESS NOTES
Subjective   Vibha Darling is a 50 y.o. female.   Chief Complaint   Patient presents with   • Shoulder Pain       History of Present Illness   Presents to the office today with several active complaints.    1.-Onset yesterday with dysuria and achy low back pain.  She has taken Pyridium to dull the burning on urination.  Symptoms are fairly intense.    2.-Pain and pressure on the right side of her head.  Sometimes it will cause her face to tingle.  She recently had fairly significant right-sided nasal congestion that has gotten better.  Nasal sprays such as Flonase have caused her to have an intense burning in her nose.  She has subsequently stopped all nasal corticosteroids.  This pain goes from around her right eye over her head to the back of her neck.  The pain in the back of her neck can be so intense that it can be hard to focus.    3.-Post Covid syndrome.  Her energy level is back nearly to 80% of what it was pre-Covid.    4.-Bilateral pulmonary emboli-this is felt to be probably a complication of her post Covid syndrome although she is not sure that it is not due to some of her chemotherapeutic agent.  She remains anticoagulated and is no longer having any chest pain or pressure or shortness of breath.  She asks if it is safe for her to fly.    5-melanoma-she has an oncologist she sees in [place].  She has a PET scan ordered Thursday.  She is concerned that she might have melanoma in her right sinus.  I do think a PET scan would be the best way to observe whether there appears to be a problem like that in her sinus.    6-she would like to get a fasting lipid panel done sometime soon.    7.-Multiple thoracic and upper arm pains-she describes the middle of her upper back just feeling weak and when she stands up she describes feeling like the middle of her back is falling down.  She states that she has to sit down whenever this happens.  She had a recent redo of an MRI of her lumbar spine when she was  hospitalized with her PE because she had a hard time standing.  She did have a lot of radiation to her thorax.  She also has pains running down the back of both arms and both arms become very heavy and weak.      Patient Active Problem List    Diagnosis Date Noted   • DDD (degenerative disc disease), cervical 04/19/2021   • Anxiety 02/03/2021   • Pulmonary embolism (CMS/HCC) 02/02/2021     Note Last Updated: 2/8/2021     Bilateral - 2/2/21     • Malignant melanoma of torso excluding breast (CMS/HCC) 01/21/2021   • Thoracic radiculopathy 11/02/2020   • Inappropriate sinus tachycardia 10/26/2020   • Eosinophilic esophagitis 10/26/2020   • Lumbar degenerative disc disease 09/13/2020     Note Last Updated: 9/13/2020     MRI at [facility] - 7/27/20 - 1. No finding suspicious for metastatic disease  2. Lumbar spondylosis with degenerative disc disease and lateral recess stenosis at L5-S1.     • Melanoma of back (CMS/HCC) 07/23/2020   • Melanoma (CMS/HCC) 06/17/2020   • Abnormal electrocardiogram (ECG) (EKG) 04/22/2020   • Chest pain, atypical 04/21/2020   • Mixed hyperlipidemia 04/21/2020   • Elevated LFTs 04/21/2020   • Overweight (BMI 25.0-29.9) 04/21/2020   • Generalized anxiety disorder 04/21/2020   • Essential hypertension 05/01/2019           Past Surgical History:   Procedure Laterality Date   • CHOLECYSTECTOMY     • COLONOSCOPY     • DILATATION AND CURETTAGE     • DILATATION AND CURETTAGE  2010   • ECTOPIC PREGNANCY     • ECTOPIC PREGNANCY SURGERY     • SKIN CANCER EXCISION  07/01/2020     Current Outpatient Medications on File Prior to Visit   Medication Sig   • ALPRAZolam (XANAX) 0.25 MG tablet Take 0.25 mg by mouth 2 (Two) Times a Day As Needed for Anxiety.   • citalopram (CeleXA) 5 MG half tablet Take 5 mg by mouth Daily.   • losartan (COZAAR) 25 MG tablet Take 25 mg by mouth Daily.   • meclizine (ANTIVERT) 25 MG tablet Take 1 tablet by mouth 3 (Three) Times a Day As Needed for Dizziness.   • Prevalite 4 GM/DOSE  "powder DISSOLVE 2 SCOOPS IN 8OZ OF FLUID EVERY DAY   • rivaroxaban (XARELTO) 20 MG tablet Take 1 tablet by mouth Daily.   • LORazepam (ATIVAN) 0.5 MG tablet Take 0.5 mg by mouth Every 8 (Eight) Hours As Needed for Anxiety (TID PRN). 0.25 tid prn   • propranolol (INDERAL) 10 MG tablet Take 10 mg by mouth 3 (Three) Times a Day.     No current facility-administered medications on file prior to visit.     Allergies   Allergen Reactions   • Doxycycline Other (See Comments) and Rash     Flushing     • Dye Fdc Red [Red Dye] Anaphylaxis   • Latex Itching and Rash     Skin peeling   • Levofloxacin Other (See Comments) and Rash     Flushing     • Lisinopril Cough   • Ciprofloxacin Other (See Comments)     Flushing    • Contrast Dye Itching   • Iodinated Diagnostic Agents Itching   • Penicillins Rash     Patient states she can take penicillin-     Social History     Socioeconomic History   • Marital status:      Spouse name: Not on file   • Number of children: Not on file   • Years of education: Not on file   • Highest education level: Not on file   Tobacco Use   • Smoking status: Never Smoker   • Smokeless tobacco: Never Used   Substance and Sexual Activity   • Alcohol use: No   • Drug use: Never   • Sexual activity: Defer     Family History   Problem Relation Age of Onset   • Heart disease Father    • Supraventricular tachycardia Father    • Heart disease Paternal Aunt    • Heart disease Paternal Uncle    • Thrombophilia Paternal Uncle    • Heart disease Paternal Uncle    • Alcohol abuse Paternal Uncle    • Hypertension Sister    • Hypertension Brother    • Hypertension Sister    • Hypertension Sister        Review of Systems    Objective   /94 (BP Location: Right arm, Patient Position: Sitting, Cuff Size: Adult)   Pulse 90   Temp 97.8 °F (36.6 °C) (Infrared)   Ht 170.2 cm (67.01\")   Wt 71 kg (156 lb 9.6 oz)   SpO2 96%   BMI 24.52 kg/m²   Physical Exam  Constitutional:       Appearance: She is " well-developed.      Comments: Wearing a face mask     HENT:      Head: Normocephalic and atraumatic.   Eyes:      Conjunctiva/sclera: Conjunctivae normal.   Cardiovascular:      Rate and Rhythm: Normal rate.   Pulmonary:      Effort: Pulmonary effort is normal.   Musculoskeletal:         General: Normal range of motion.      Cervical back: Normal range of motion.      Comments: She has KT tape applied over the upper back.  No visible abnormalities.  She has visibly normal range of motion of both upper arms.   Skin:     General: Skin is warm and dry.      Findings: No rash.   Neurological:      Mental Status: She is alert and oriented to person, place, and time.   Psychiatric:         Behavior: Behavior normal.             Office Visit on 04/19/2021   Component Date Value Ref Range Status   • Color 04/19/2021 Yellow  Yellow, Straw, Dark Yellow, Tiffany Final   • Clarity, UA 04/19/2021 Clear  Clear Final   • Specific Gravity  04/19/2021 1.025  1.005 - 1.030 Final   • pH, Urine 04/19/2021 5.0  5.0 - 8.0 Final   • Leukocytes 04/19/2021 Negative  Negative Final   • Nitrite, UA 04/19/2021 Positive* Negative Final   • Protein, POC 04/19/2021 Negative  Negative mg/dL Final   • Glucose, UA 04/19/2021 Negative  Negative, 1000 mg/dL (3+) mg/dL Final   • Ketones, UA 04/19/2021 Negative  Negative Final   • Urobilinogen, UA 04/19/2021 Normal  Normal Final   • Bilirubin 04/19/2021 Negative  Negative Final   • Blood, UA 04/19/2021 Moderate* Negative Final         Assessment/Plan   Diagnoses and all orders for this visit:    1. Burning with urination (Primary)  -     POC Urinalysis Dipstick, Automated  -     Urine Culture - Urine, Urine, Clean Catch  -     sulfamethoxazole-trimethoprim (Bactrim DS) 800-160 MG per tablet; Take 1 tablet by mouth 2 (Two) Times a Day for 7 days.  Dispense: 14 tablet; Refill: 0    2. Thoracic radiculopathy  -     MRI Thoracic Spine Without Contrast; Future    3. DDD (degenerative disc disease),  cervical  -     MRI Cervical Spine Without Contrast; Future    4. Mixed hyperlipidemia  -     Lipid Panel; Future    5. Essential hypertension    6. Multiple subsegmental pulmonary emboli without acute cor pulmonale (CMS/HCC)    It sure seems like she has a UTI.  Urinalysis suggests that.  We will send the urine off for culture.  We will go ahead and treat with Bactrim DS presumptively, and await her urine culture.    Blood pressure has typically been mostly normal.  She has been checking her blood pressure at home and at work.  It was up today in the office because she said she is very nervous about coming in because typically medical interactions of lead to complications for her over the past several months.    She would like to get a lipid panel checked when fasting some morning.  I have placed order for that.  I think it is okay for her to be on an airplane    From the standpoint of her pulmonary embolism.  She is not vaccinated against COVID-19 but recently demonstrated antibodies.  With caution, I think she should be able to fly.  It is not clear at all when she will be an appropriate candidate for a vaccine as she has had numerous complications over the past year and she has had sensitivity to numerous medications and treatments.    Continue anticoagulation for her pulmonary embolism.    The multitude of pain complaints that she has may be related to radiculopathy or other compressive pathology.  She could be experiencing nerve pain following radiation which she had extensively to her upper back.  We will proceed with MRI of her thoracic and cervical spine.  She already has well-documented significant pathology in her lumbar spine.  I will follow-up with her as soon as these results are available.         Return if symptoms worsen or fail to improve.    Call with any problems or concerns before next visit

## 2021-04-20 ENCOUNTER — LAB (OUTPATIENT)
Dept: FAMILY MEDICINE CLINIC | Facility: CLINIC | Age: 51
End: 2021-04-20

## 2021-04-20 DIAGNOSIS — R00.0 INAPPROPRIATE SINUS TACHYCARDIA: Primary | ICD-10-CM

## 2021-04-20 DIAGNOSIS — R00.0 INAPPROPRIATE SINUS TACHYCARDIA: ICD-10-CM

## 2021-04-20 DIAGNOSIS — E78.2 MIXED HYPERLIPIDEMIA: Chronic | ICD-10-CM

## 2021-04-20 LAB
CHOLEST SERPL-MCNC: 249 MG/DL (ref 0–200)
HDLC SERPL-MCNC: 70 MG/DL (ref 40–60)
LDLC SERPL CALC-MCNC: 125 MG/DL (ref 0–100)
LDLC/HDLC SERPL: 1.67 {RATIO}
TRIGL SERPL-MCNC: 312 MG/DL (ref 0–150)
VLDLC SERPL-MCNC: 54 MG/DL (ref 5–40)

## 2021-04-20 PROCEDURE — 80061 LIPID PANEL: CPT | Performed by: FAMILY MEDICINE

## 2021-04-20 PROCEDURE — 36415 COLL VENOUS BLD VENIPUNCTURE: CPT | Performed by: FAMILY MEDICINE

## 2021-04-20 PROCEDURE — 82306 VITAMIN D 25 HYDROXY: CPT | Performed by: FAMILY MEDICINE

## 2021-04-21 LAB
25(OH)D3+25(OH)D2 SERPL-MCNC: 29.7 NG/ML (ref 30–100)
BACTERIA UR CULT: NORMAL
BACTERIA UR CULT: NORMAL

## 2021-04-22 ENCOUNTER — DOCUMENTATION (OUTPATIENT)
Dept: FAMILY MEDICINE CLINIC | Facility: CLINIC | Age: 51
End: 2021-04-22

## 2021-04-23 RX ORDER — CHOLESTYRAMINE 4 G/5.5G
POWDER, FOR SUSPENSION ORAL
Qty: 693 G | Refills: 1 | Status: SHIPPED | OUTPATIENT
Start: 2021-04-23 | End: 2021-08-11 | Stop reason: SDUPTHER

## 2021-05-25 ENCOUNTER — TELEMEDICINE (OUTPATIENT)
Dept: FAMILY MEDICINE CLINIC | Facility: CLINIC | Age: 51
End: 2021-05-25

## 2021-05-25 DIAGNOSIS — U09.9 POST-COVID-19 CONDITION: ICD-10-CM

## 2021-05-25 DIAGNOSIS — M62.81 MUSCLE WEAKNESS OF EXTREMITY: ICD-10-CM

## 2021-05-25 DIAGNOSIS — R00.0 TACHYCARDIA: Primary | ICD-10-CM

## 2021-05-25 DIAGNOSIS — R53.83 FATIGUE, UNSPECIFIED TYPE: ICD-10-CM

## 2021-05-25 DIAGNOSIS — R42 DIZZINESS: ICD-10-CM

## 2021-05-25 PROCEDURE — 99214 OFFICE O/P EST MOD 30 MIN: CPT | Performed by: FAMILY MEDICINE

## 2021-05-25 RX ORDER — METHYLPREDNISOLONE 4 MG/1
TABLET ORAL
COMMUNITY
Start: 2021-05-20 | End: 2021-06-21

## 2021-05-25 RX ORDER — ALPRAZOLAM 0.25 MG/1
0.25 TABLET ORAL AS NEEDED
COMMUNITY
End: 2022-09-14 | Stop reason: ALTCHOICE

## 2021-05-25 RX ORDER — CYCLOBENZAPRINE HCL 5 MG
5 TABLET ORAL
COMMUNITY
Start: 2021-04-29 | End: 2021-05-29

## 2021-05-25 NOTE — PROGRESS NOTES
Subjective   Vibha Darling is a 50 y.o. female.   Chief Complaint   Patient presents with   • Hospital Follow Up Visit       History of Present Illness   You have chosen to receive care through a telehealth visit.  Do you consent to use a video/audio connection for your medical care today? Yes    Louisa is evaluated today via video visit.  Her problems continue largely unimproved.  She had an episode recently where she was hospitalized at [facility] for tachycardia, confusion, tingling in her face and pains radiating from her neck through her head on the right side.  She had complete MRI evaluations.  She had a nuclear stress test that was normal.  She was evaluated by neurology, neurosurgery, cardiology.  The cardiologist suggested that this may be related to post Covid cardiac inflammation.  Her Covid diagnosis was, at this point, 8 months ago.  He has ordered a cardiac MRI but that is not scheduled until June.  Her usual cardiologist is Dr. Ambrose.  She has not seen him yet about this problem. She has not had any EP studies. She is taking 6.25 mg of Lopressor twice daily    She went back to the emergency room on Friday with a complaint of fatigue and confusion.  She had full MRI and MRA done.  I was able to identify those reports and they were indeed normal.  She continues to have tachycardia with minor exertion.  She reports walking about 500 steps to watch her grandson's baseball game.  Her heart rate went up to 140 beats a minute she became very fatigued and required assistance to get back to the car. Her legs remain very sore after this event.  The symptoms of general leg weakness are persisting.  She feels a lot of spasms in her muscles of her legs.  She feels twitching in her muscles and reportedly also has had this in her face.  She has been referred to Dr. Khan with [facility] neuroscience center due to his knowledge of movement disorders.  Her appointment there is not until November of this  year.    Emotionally, she is becoming discouraged that these problems have been lingering for so long.          Patient Active Problem List    Diagnosis Date Noted   • DDD (degenerative disc disease), cervical 04/19/2021   • Anxiety 02/03/2021   • Pulmonary embolism (CMS/HCC) 02/02/2021     Note Last Updated: 2/8/2021     Bilateral - 2/2/21     • Malignant melanoma of torso excluding breast (CMS/HCC) 01/21/2021   • Thoracic radiculopathy 11/02/2020   • Inappropriate sinus tachycardia 10/26/2020   • Eosinophilic esophagitis 10/26/2020   • Lumbar degenerative disc disease 09/13/2020     Note Last Updated: 9/13/2020     MRI at [facility] - 7/27/20 - 1. No finding suspicious for metastatic disease  2. Lumbar spondylosis with degenerative disc disease and lateral recess stenosis at L5-S1.     • Melanoma of back (CMS/HCC) 07/23/2020   • Melanoma (CMS/[facility]) 06/17/2020   • Abnormal electrocardiogram (ECG) (EKG) 04/22/2020   • Chest pain, atypical 04/21/2020   • Mixed hyperlipidemia 04/21/2020   • Elevated LFTs 04/21/2020   • Overweight (BMI 25.0-29.9) 04/21/2020   • Generalized anxiety disorder 04/21/2020   • Essential hypertension 05/01/2019           Past Surgical History:   Procedure Laterality Date   • CHOLECYSTECTOMY     • COLONOSCOPY     • DILATATION AND CURETTAGE     • DILATATION AND CURETTAGE  2010   • ECTOPIC PREGNANCY     • ECTOPIC PREGNANCY SURGERY     • SKIN CANCER EXCISION  07/01/2020     Current Outpatient Medications on File Prior to Visit   Medication Sig   • methylPREDNISolone (MEDROL) 4 MG dose pack follow package directions.   • pantoprazole (PROTONIX) 40 mg in 100mL NS IVPB    • ALPRAZolam (XANAX) 0.25 MG tablet Take 0.25 mg by mouth Daily.   • citalopram (CeleXA) 5 MG half tablet Take 5 mg by mouth Daily.   • losartan (COZAAR) 25 MG tablet Take 25 mg by mouth Daily.   • meclizine (ANTIVERT) 25 MG tablet Take 1 tablet by mouth 3 (Three) Times a Day As Needed for Dizziness.   • Prevalite 4 GM/DOSE powder DISSOLVE  2 SCOOPS IN 8OZ OF FLUID EVERY DAY   • propranolol (INDERAL) 10 MG tablet Take 10 mg by mouth 3 (Three) Times a Day.   • rivaroxaban (XARELTO) 20 MG tablet Take 1 tablet by mouth Daily.     No current facility-administered medications on file prior to visit.     Allergies   Allergen Reactions   • Doxycycline Other (See Comments) and Rash     Flushing     • Dye Fdc Red [Red Dye] Anaphylaxis   • Latex Itching and Rash     Skin peeling   • Levofloxacin Other (See Comments) and Rash     Flushing     • Lisinopril Cough   • Ciprofloxacin Other (See Comments)     Flushing    • Contrast Dye Itching   • Iodinated Diagnostic Agents Itching   • Penicillins Rash     Patient states she can take penicillin-     Social History     Socioeconomic History   • Marital status:      Spouse name: Not on file   • Number of children: Not on file   • Years of education: Not on file   • Highest education level: Not on file   Tobacco Use   • Smoking status: Never Smoker   • Smokeless tobacco: Never Used   Substance and Sexual Activity   • Alcohol use: No   • Drug use: Never   • Sexual activity: Defer     Family History   Problem Relation Age of Onset   • Heart disease Father    • Supraventricular tachycardia Father    • Heart disease Paternal Aunt    • Heart disease Paternal Uncle    • Thrombophilia Paternal Uncle    • Heart disease Paternal Uncle    • Alcohol abuse Paternal Uncle    • Hypertension Sister    • Hypertension Brother    • Hypertension Sister    • Hypertension Sister        Review of Systems    Objective   There were no vitals taken for this visit.  Physical Exam  Constitutional:       General: She is not in acute distress.     Appearance: She is well-developed.   HENT:      Head: Normocephalic and atraumatic.   Pulmonary:      Effort: No accessory muscle usage or respiratory distress.   Neurological:      Mental Status: She is alert and oriented to person, place, and time.      Comments: Calm, cooperative. Good insight.  Tearful at times.   Psychiatric:         Speech: Speech normal.           Lab on 04/20/2021   Component Date Value Ref Range Status   • 25 Hydroxy, Vitamin D 04/20/2021 29.7* 30.0 - 100.0 ng/mL Final    Vitamin D deficiency has been defined by the [place] of  Medicine and an Endocrine Society practice guideline as a  level of serum 25-OH vitamin D less than 20 ng/mL (1,2).  The Endocrine Society went on to further define vitamin D  insufficiency as a level between 21 and 29 ng/mL (2).  1. IOM ([place] of Medicine). 2010. Dietary reference     intakes for calcium and D. [place]: The     National Academies Press.  2. Tammy MF, Buddy NC, Maximus DEL REAL, et al.     Evaluation, treatment, and prevention of vitamin D     deficiency: an Endocrine Society clinical practice     guideline. JCEM. 2011 Jul; 96(7):1911-30.   • Total Cholesterol 04/20/2021 249* 0 - 200 mg/dL Final   • Triglycerides 04/20/2021 312* 0 - 150 mg/dL Final   • HDL Cholesterol 04/20/2021 70* 40 - 60 mg/dL Final   • LDL Cholesterol  04/20/2021 125* 0 - 100 mg/dL Final   • VLDL Cholesterol 04/20/2021 54* 5 - 40 mg/dL Final   • LDL/HDL Ratio 04/20/2021 1.67   Final   Office Visit on 04/19/2021   Component Date Value Ref Range Status   • Color 04/19/2021 Yellow  Yellow, Straw, Dark Yellow, Tiffany Final   • Clarity, UA 04/19/2021 Clear  Clear Final   • Specific Gravity  04/19/2021 1.025  1.005 - 1.030 Final   • pH, Urine 04/19/2021 5.0  5.0 - 8.0 Final   • Leukocytes 04/19/2021 Negative  Negative Final   • Nitrite, UA 04/19/2021 Positive* Negative Final   • Protein, POC 04/19/2021 Negative  Negative mg/dL Final   • Glucose, UA 04/19/2021 Negative  Negative, 1000 mg/dL (3+) mg/dL Final   • Ketones, UA 04/19/2021 Negative  Negative Final   • Urobilinogen, UA 04/19/2021 Normal  Normal Final   • Bilirubin 04/19/2021 Negative  Negative Final   • Blood, UA 04/19/2021 Moderate* Negative Final   • Urine Culture 04/19/2021 Final report   Final   •  Result 1 04/19/2021 Comment   Final    Comment: Mixed urogenital mitchell  Less than 10,000 colonies/mL     Lab on 03/23/2021   Component Date Value Ref Range Status   • SARS-COV-2 ANTIBODY, IGA 03/23/2021 Negative  Negative Final    This sample does not contain detectable SARS-CoV-2 IgA antibodies.  This negative result does not rule out SARS-CoV-2 infection.  Correlation with epidemiologic risk factors and other clinical and  laboratory findings is recommended. Serologic results should not be  used as the sole basis to diagnose or exclude recent SARS-CoV-2  infection.   • TSH 03/23/2021 0.983  0.270 - 4.200 uIU/mL Final   • Glucose 03/23/2021 100* 65 - 99 mg/dL Final   • BUN 03/23/2021 8  6 - 20 mg/dL Final   • Creatinine 03/23/2021 0.70  0.57 - 1.00 mg/dL Final   • Sodium 03/23/2021 143  136 - 145 mmol/L Final   • Potassium 03/23/2021 3.6  3.5 - 5.2 mmol/L Final   • Chloride 03/23/2021 100  98 - 107 mmol/L Final   • CO2 03/23/2021 28.5  22.0 - 29.0 mmol/L Final   • Calcium 03/23/2021 9.8  8.6 - 10.5 mg/dL Final   • Total Protein 03/23/2021 7.0  6.0 - 8.5 g/dL Final   • Albumin 03/23/2021 4.60  3.50 - 5.20 g/dL Final   • ALT (SGPT) 03/23/2021 27  1 - 33 U/L Final   • AST (SGOT) 03/23/2021 16  1 - 32 U/L Final   • Alkaline Phosphatase 03/23/2021 70  39 - 117 U/L Final   • Total Bilirubin 03/23/2021 0.3  0.0 - 1.2 mg/dL Final   • eGFR Non  Amer 03/23/2021 89  >60 mL/min/1.73 Final   • Globulin 03/23/2021 2.4  gm/dL Final   • A/G Ratio 03/23/2021 1.9  g/dL Final   • BUN/Creatinine Ratio 03/23/2021 11.4  7.0 - 25.0 Final   • Anion Gap 03/23/2021 14.5  5.0 - 15.0 mmol/L Final   • Antinuclear Antibodies (GARCIA) 03/23/2021 Negative  Negative Final   • WBC 03/23/2021 7.13  3.40 - 10.80 10*3/mm3 Final   • RBC 03/23/2021 4.45  3.77 - 5.28 10*6/mm3 Final   • Hemoglobin 03/23/2021 14.4  12.0 - 15.9 g/dL Final   • Hematocrit 03/23/2021 42.3  34.0 - 46.6 % Final   • MCV 03/23/2021 95.1  79.0 - 97.0 fL Final   • MCH 03/23/2021  32.4  26.6 - 33.0 pg Final   • MCHC 03/23/2021 34.0  31.5 - 35.7 g/dL Final   • RDW 03/23/2021 13.0  12.3 - 15.4 % Final   • RDW-SD 03/23/2021 45.0  37.0 - 54.0 fl Final   • MPV 03/23/2021 10.0  6.0 - 12.0 fL Final   • Platelets 03/23/2021 368  140 - 450 10*3/mm3 Final   • Neutrophil % 03/23/2021 71.8  42.7 - 76.0 % Final   • Lymphocyte % 03/23/2021 19.4* 19.6 - 45.3 % Final   • Monocyte % 03/23/2021 6.5  5.0 - 12.0 % Final   • Eosinophil % 03/23/2021 1.4  0.3 - 6.2 % Final   • Basophil % 03/23/2021 0.8  0.0 - 1.5 % Final   • Immature Grans % 03/23/2021 0.1  0.0 - 0.5 % Final   • Neutrophils, Absolute 03/23/2021 5.12  1.70 - 7.00 10*3/mm3 Final   • Lymphocytes, Absolute 03/23/2021 1.38  0.70 - 3.10 10*3/mm3 Final   • Monocytes, Absolute 03/23/2021 0.46  0.10 - 0.90 10*3/mm3 Final   • Eosinophils, Absolute 03/23/2021 0.10  0.00 - 0.40 10*3/mm3 Final   • Basophils, Absolute 03/23/2021 0.06  0.00 - 0.20 10*3/mm3 Final   • Immature Grans, Absolute 03/23/2021 0.01  0.00 - 0.05 10*3/mm3 Final   • nRBC 03/23/2021 0.0  0.0 - 0.2 /100 WBC Final   Results Encounter on 03/22/2021   Component Date Value Ref Range Status   • SARS-COV-2 ANTIBODY, IGM 03/23/2021 Positive  Negative Final    Results suggest recent or prior infection with SARS-CoV-2. Correlation  with epidemiologic risk factors and other clinical and laboratory  findings is recommended. Serologic results should not be used as the  sole basis to diagnose or exclude recent SARS-CoV-2 infection. False  positive results infrequently occur due to prior infection with other  human Coronaviruses.  This assay detects antibodies against SARS-CoV-2 spike protein  including the receptor binding domain (RBD).   • DIASORIN SARS-COV-2 AB, IGG 03/23/2021 Positive  Negative Final    Results suggest recent or prior infection with SARS-CoV-2. Correlation  with epidemiologic risk factors and other clinical and laboratory  findings is recommended. Serologic results should not be used as  the  sole basis to diagnose or exclude recent SARS-CoV-2 infection. False  positive results infrequently occur due to prior infection with other  human Coronaviruses.  This assay was performed using the [employer](R)  SARS-CoV-2 S1/S2 IgG assay.  This assay detects antibodies against SARS-CoV-2 spike protein  including the receptor binding domain (RBD).         Assessment/Plan   Diagnoses and all orders for this visit:    1. Tachycardia (Primary)    2. Dizziness    3. Fatigue, unspecified type  -     Vitamin B12; Future  -     Vitamin B1, Whole Blood; Future  -     CBC & Differential; Future    4. Muscle weakness of extremity  -     Lactic Acid, Plasma; Future    5. Post-COVID-19 condition      Ongoing, complicated situation with unclear final diagnosis and prognosis.  I have advised her to talk with her cardiologist, Dr. Ambrose regarding ordering the cardiac MRI and following up with her regarding this. If she is able to stick to 1 specialist her problem, then as circumstances change and evolve that person will become more familiar with what is going on and can help narrow the diagnosis.  Likewise for neurology. I am discouraged as well that the neurologist she was recommended to see can't see her until November. I'm going to check with [facility] and see if we can get her in there sooner.  Her B12 level was borderline several months ago. We'll repeat that today as well as a thiamine level, CBC and a lactic acid because of her muscle pains and weakness in her legs.  It doesn't really sound a lot like intermittent claudication, as the symptoms don't really improve with rest.       Call with any problems or concerns before next visit  No follow-ups on file.      Much of this report is an electronic transcription of spoken language to printed text using Dragon dictation software.  As such, the subtleties and finesse of spoken language may permit erroneous, or at times, nonsensical words or phrases to be  inadvertently transcribed; thus changes may be made at a later date to rectify these errors.

## 2021-05-27 ENCOUNTER — LAB (OUTPATIENT)
Dept: FAMILY MEDICINE CLINIC | Facility: CLINIC | Age: 51
End: 2021-05-27

## 2021-06-01 RX ORDER — RIVAROXABAN 20 MG/1
TABLET, FILM COATED ORAL
Qty: 90 TABLET | Refills: 1 | Status: SHIPPED | OUTPATIENT
Start: 2021-06-01 | End: 2021-06-02 | Stop reason: SDUPTHER

## 2021-06-03 ENCOUNTER — TELEPHONE (OUTPATIENT)
Dept: FAMILY MEDICINE CLINIC | Facility: CLINIC | Age: 51
End: 2021-06-03

## 2021-06-03 NOTE — TELEPHONE ENCOUNTER
MARY. Pt calling in stating she is very sick today, with multiple symptoms. Pt wanted to talk with Dr. Samano, I advised her that he is not in office on Thursdays.

## 2021-06-03 NOTE — TELEPHONE ENCOUNTER
I tried to call [facility] this morning, the gal I spoke to transferred me to the referral dept, I had to leave a message.

## 2021-06-03 NOTE — TELEPHONE ENCOUNTER
Jazmyne  Vibha has reached out to me through [employer] because of some ongoing neurologic symptoms.  I have made a referral to [facility] Department of neurology.  The referral status shows ready to schedule.  In cases like this, I have had clinical staff call and facilitate getting the referral scheduled.  In particular, Melia has done this several times and can help if you have any questions.  Bottom line, would you contact the [facility] Department of Neurology regarding Vibha Darling and talk to them about trying to get her scheduled?  She has multiple notes in the chart with more details about her symptoms.  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    Thanks a lot!

## 2021-06-07 PROBLEM — R00.2 PALPITATIONS: Status: ACTIVE | Noted: 2021-06-07

## 2021-06-10 PROBLEM — I26.94 MULTIPLE SUBSEGMENTAL PULMONARY EMBOLI WITHOUT ACUTE COR PULMONALE (HCC): Status: ACTIVE | Noted: 2021-02-02

## 2021-06-14 ENCOUNTER — TELEPHONE (OUTPATIENT)
Dept: FAMILY MEDICINE CLINIC | Facility: CLINIC | Age: 51
End: 2021-06-14

## 2021-06-14 NOTE — TELEPHONE ENCOUNTER
HUB TO READ:  I tried to contact patient to give her the phone number to the [facility] office. She is schedule for Nov 15, as of now. If she calls back, please give her this number to try to reach out to them. [phone]

## 2021-06-16 ENCOUNTER — TELEPHONE (OUTPATIENT)
Dept: FAMILY MEDICINE CLINIC | Facility: CLINIC | Age: 51
End: 2021-06-16

## 2021-06-16 DIAGNOSIS — R20.2 PARESTHESIAS: Primary | ICD-10-CM

## 2021-06-16 NOTE — TELEPHONE ENCOUNTER
----- Message from Dana Kent MA sent at 6/16/2021  3:45 PM EDT -----  Regarding: FW: Non-Urgent Medical Question  Contact: [phone]    ----- Message -----  From: Vibha Darling  Sent: 6/16/2021   3:24 PM EDT  To: [place]  Subject: RE: Non-Urgent Medical Question                  Dr Sanchez,  It’s ok they are a very hard specialist to get into. There is  a neurologist in [place] I’ll attached his number to see if we can get a earlier appointment. I was up all night with the same symptoms as Saturday weakness muscle cramping and unsteady gait and wasn’t able to work today. Can we contact them and see if they may have an availability. I tried to make the appointment but they told me my primary  office had to call. Thank you again for everything I really appreciate it very much. I’ve also contact the [facility] to see if they will accept me.    Have a good day   Vibha

## 2021-06-21 ENCOUNTER — TELEMEDICINE (OUTPATIENT)
Dept: CARDIOLOGY | Facility: CLINIC | Age: 51
End: 2021-06-21

## 2021-06-21 DIAGNOSIS — R94.31 ABNORMAL ELECTROCARDIOGRAM (ECG) (EKG): ICD-10-CM

## 2021-06-21 DIAGNOSIS — I10 ESSENTIAL HYPERTENSION: Chronic | ICD-10-CM

## 2021-06-21 DIAGNOSIS — R00.2 PALPITATIONS: ICD-10-CM

## 2021-06-21 DIAGNOSIS — R00.0 INAPPROPRIATE SINUS TACHYCARDIA: ICD-10-CM

## 2021-06-21 DIAGNOSIS — R07.89 CHEST PAIN, ATYPICAL: ICD-10-CM

## 2021-06-21 DIAGNOSIS — U07.1 COVID-19 VIRUS DETECTED: Primary | ICD-10-CM

## 2021-06-21 PROCEDURE — 99214 OFFICE O/P EST MOD 30 MIN: CPT | Performed by: INTERNAL MEDICINE

## 2021-06-21 RX ORDER — CYANOCOBALAMIN 1000 UG/ML
INJECTION, SOLUTION INTRAMUSCULAR; SUBCUTANEOUS
COMMUNITY
Start: 2021-06-19 | End: 2022-08-15 | Stop reason: SDUPTHER

## 2021-06-21 RX ORDER — SERTRALINE HYDROCHLORIDE 25 MG/1
25 TABLET, FILM COATED ORAL DAILY
COMMUNITY
End: 2021-07-21 | Stop reason: SDUPTHER

## 2021-06-21 NOTE — PROGRESS NOTES
Cardiology Virtual Video Visit      Encounter Date:  06/21/2021    Patient ID:   Vibha Darling is a 50 y.o. female.    Reason For Followup:  Palpitations  Hypertension      Brief Clinical History:  Dear Dr. Sanchez, Jazmyne Uribe MD    I had the pleasure of seeing Vibha Darling today in a virtual video format. As you are well aware, this is a 50 y.o. female with no established history of ischemic heart disease.  She does have a history of hypertension, hyperlipidemia, anxiety, history of COVID-19 infection and palpitations.  She presents today for follow-up on the above conditions.    Interval History:  She reports some intermittent chest tightness that feels like a band around her chest from time to time.  There is some radiation to her right shoulder.  She is reporting intermittent palpitations are predominantly occur in the mornings.    She reports that since our last visit, she has been to the hospital several times.  The first time was for confusion.  She went to [facility] and because of some tachycardia she underwent a chemical stress test that was negative for ischemia.  She was started on Lopressor 25 mg as needed.  She reports that she had needed to take the Lopressor more regularly.    She had at least 2 further visits to [facility] and 1 or 2 visits to [facility].  While she was at [facility] she had a 2D echocardiogram that revealed normal LV systolic function but a questionable posterior pericardial effusion that on CT was found to be negative.    She reports that she had a neck to pelvis CT angiography that was negative for any significant pathology.  During one of her admissions at [place] she did request to see psychiatry because she felt like anxiety may be playing a role in this.  Her medications were adjusted.    She is concerned that her symptoms may be a constellation of post Covid and PTSD/stress related type symptoms.  Neurology reported to her that she may  have small fiber neuropathy as a result of her Covid.  We discussed options and will have her undergo a cardiac MRI to see if there is any underlying inflammation that could be responsible for her tachyarrhythmia.    She has also undergone an ambulatory ECG in the past.  She has occasional PVCs that account for less than 1% of monitored QRS activity.  We reviewed her echocardiogram.  Her echocardiogram demonstrates normal LV systolic function with an ejection fraction of 60 to 65%.  She has mild tricuspid insufficiency and grade 2 diastolic dysfunction.      Assessment & Plan    Impressions:  Palpitations and tachycardia-improved  Hypertension with a significant situational component -improved  Anxiety  Hyperlipidemia  History of COVID-19 infection  History of melanoma status post radiation therapy  History of eosinophilic esophagitis  Family history of SVT  Family history of sudden cardiac death    Recommendations:  Discontinue metoprolol  Add diltiazem 30 mg 3 times daily  Monitor blood pressure and heart rate  Cardiac MRI  Follow-up in 3 to 4 weeks post MRI.    Objective:    Vitals:  There were no vitals filed for this visit.      Allergies:  Allergies   Allergen Reactions   • Doxycycline Other (See Comments) and Rash     Flushing     • Dye Fdc Red [Red Dye] Anaphylaxis   • Latex Itching and Rash     Skin peeling   • Levofloxacin Other (See Comments) and Rash     Flushing     • Lisinopril Cough   • Ciprofloxacin Other (See Comments)     Flushing    • Contrast Dye Itching   • Iodinated Diagnostic Agents Itching   • Penicillins Rash     Patient states she can take penicillin-       Medication Review:     Current Outpatient Medications:   •  ALPRAZolam (XANAX) 0.25 MG tablet, Take 0.25 mg by mouth Daily., Disp: , Rfl:   •  cyanocobalamin 1000 MCG/ML injection, , Disp: , Rfl:   •  metoprolol tartrate (LOPRESSOR) 25 MG tablet, 25 mg Daily. 1/2 tablet po daily, Disp: , Rfl:   •  ondansetron (ZOFRAN) 8 MG tablet, Take 8  mg by mouth., Disp: , Rfl:   •  pantoprazole (PROTONIX) 40 mg in 100mL NS IVPB, , Disp: , Rfl:   •  Prevalite 4 GM/DOSE powder, DISSOLVE 2 SCOOPS IN 8OZ OF FLUID EVERY DAY, Disp: 693 g, Rfl: 1  •  sertraline (ZOLOFT) 25 MG tablet, Take 25 mg by mouth Daily., Disp: , Rfl:   •  apixaban (ELIQUIS) 5 MG tablet tablet, Take 1 tablet by mouth Every 12 (Twelve) Hours., Disp: 60 tablet, Rfl: 5  •  dilTIAZem (Cardizem) 30 MG tablet, Take 1 tablet by mouth 4 (Four) Times a Day., Disp: 90 tablet, Rfl: 6  •  meclizine (ANTIVERT) 25 MG tablet, Take 1 tablet by mouth 3 (Three) Times a Day As Needed for Dizziness., Disp: 30 tablet, Rfl: 0    Family History:  Family History   Problem Relation Age of Onset   • Heart disease Father    • Supraventricular tachycardia Father    • Heart disease Paternal Aunt    • Heart disease Paternal Uncle    • Thrombophilia Paternal Uncle    • Heart disease Paternal Uncle    • Alcohol abuse Paternal Uncle    • Hypertension Sister    • Hypertension Brother    • Hypertension Sister    • Hypertension Sister        Past Medical History:  Past Medical History:   Diagnosis Date   • Anxiety    • Asthma    • Dumping syndrome    • Hyperlipidemia    • Hypertension    • Malignant melanoma (CMS/HCC) 06/2020   • Overweight (BMI 25.0-29.9) 4/21/2020       Past surgical History:  Past Surgical History:   Procedure Laterality Date   • CHOLECYSTECTOMY     • COLONOSCOPY     • DILATATION AND CURETTAGE     • DILATATION AND CURETTAGE  2010   • ECTOPIC PREGNANCY     • ECTOPIC PREGNANCY SURGERY     • SKIN CANCER EXCISION  07/01/2020       Social History:  Social History     Socioeconomic History   • Marital status:      Spouse name: Not on file   • Number of children: Not on file   • Years of education: Not on file   • Highest education level: Not on file   Tobacco Use   • Smoking status: Never Smoker   • Smokeless tobacco: Never Used   Substance and Sexual Activity   • Alcohol use: No   • Drug use: Never   • Sexual  activity: Defer       Review of Systems:  The following systems were reviewed as they relate to the cardiovascular system: Constitutional, Eyes, ENT, Cardiovascular, Respiratory, Gastrointestinal, Integumentary, Neurological, Psychiatric, Hematologic, Endocrine, Musculoskeletal, and Genitourinary. The pertinent cardiovascular findings are reported above with all other cardiovascular points within those systems being negative.    Diagnostic Study Review:     Current Electrocardiogram:  Procedures      NOTE: The following portions of the patient's history were reviewed and updated this visit as appropriate: allergies, current medications, past family history, past medical history, past social history, past surgical history and problem list.    A total of 20 minutes of medical discussion occurred during this encounter.      Novel Coronavirus (COVID-19) Disclaimer:     A proclamation declaring a national emergency concerning the Novel Coronavirus Disease (COVID-19) Outbreak was issued on March 13, 2020 at the direction of the .    This virtual/video visit was performed with the patient's consent in lieu of the patient's regularly scheduled appointment in order to provide the patient with the opportunity to maintain contact with their healthcare provider while also adhering to social distancing guidelines set forth by the CDC to reduce exposure to the Novel Coronavirus (COVID-19).  Any vital signs obtained during this visit were provided by the patient.

## 2021-07-02 DIAGNOSIS — R40.0 DAYTIME SOMNOLENCE: Primary | ICD-10-CM

## 2021-07-08 ENCOUNTER — LAB (OUTPATIENT)
Dept: FAMILY MEDICINE CLINIC | Facility: CLINIC | Age: 51
End: 2021-07-08

## 2021-07-08 ENCOUNTER — TELEPHONE (OUTPATIENT)
Dept: FAMILY MEDICINE CLINIC | Facility: CLINIC | Age: 51
End: 2021-07-08

## 2021-07-08 DIAGNOSIS — B97.89 OTHER VIRAL AGENTS AS THE CAUSE OF DISEASES CLASSIFIED ELSEWHERE: Primary | ICD-10-CM

## 2021-07-08 DIAGNOSIS — U09.9 POST-COVID-19 SYNDROME: ICD-10-CM

## 2021-07-08 DIAGNOSIS — R30.0 BURNING WITH URINATION: ICD-10-CM

## 2021-07-08 DIAGNOSIS — R53.83 FATIGUE, UNSPECIFIED TYPE: ICD-10-CM

## 2021-07-08 DIAGNOSIS — B97.89 OTHER VIRAL AGENTS AS THE CAUSE OF DISEASES CLASSIFIED ELSEWHERE: ICD-10-CM

## 2021-07-08 PROCEDURE — 86038 ANTINUCLEAR ANTIBODIES: CPT | Performed by: NURSE PRACTITIONER

## 2021-07-08 PROCEDURE — 86255 FLUORESCENT ANTIBODY SCREEN: CPT | Performed by: NURSE PRACTITIONER

## 2021-07-08 PROCEDURE — 80053 COMPREHEN METABOLIC PANEL: CPT | Performed by: NURSE PRACTITIONER

## 2021-07-08 PROCEDURE — 86160 COMPLEMENT ANTIGEN: CPT | Performed by: NURSE PRACTITIONER

## 2021-07-08 PROCEDURE — 86140 C-REACTIVE PROTEIN: CPT | Performed by: NURSE PRACTITIONER

## 2021-07-08 PROCEDURE — 81001 URINALYSIS AUTO W/SCOPE: CPT

## 2021-07-08 PROCEDURE — 87086 URINE CULTURE/COLONY COUNT: CPT

## 2021-07-08 PROCEDURE — 86200 CCP ANTIBODY: CPT | Performed by: NURSE PRACTITIONER

## 2021-07-08 PROCEDURE — 86618 LYME DISEASE ANTIBODY: CPT | Performed by: NURSE PRACTITIONER

## 2021-07-08 PROCEDURE — 84443 ASSAY THYROID STIM HORMONE: CPT | Performed by: NURSE PRACTITIONER

## 2021-07-08 PROCEDURE — 85652 RBC SED RATE AUTOMATED: CPT | Performed by: NURSE PRACTITIONER

## 2021-07-08 PROCEDURE — 85025 COMPLETE CBC W/AUTO DIFF WBC: CPT | Performed by: NURSE PRACTITIONER

## 2021-07-08 PROCEDURE — 84550 ASSAY OF BLOOD/URIC ACID: CPT | Performed by: NURSE PRACTITIONER

## 2021-07-08 PROCEDURE — 81374 HLA I TYPING 1 ANTIGEN LR: CPT | Performed by: NURSE PRACTITIONER

## 2021-07-09 LAB
ALBUMIN SERPL-MCNC: 4.9 G/DL (ref 3.5–5.2)
ALBUMIN/GLOB SERPL: 1.4 G/DL
ALP SERPL-CCNC: 70 U/L (ref 39–117)
ALT SERPL W P-5'-P-CCNC: 29 U/L (ref 1–33)
ANA SER QL: NEGATIVE
ANION GAP SERPL CALCULATED.3IONS-SCNC: 13.2 MMOL/L (ref 5–15)
AST SERPL-CCNC: 23 U/L (ref 1–32)
B BURGDOR IGG+IGM SER-ACNC: <0.91 ISR (ref 0–0.9)
B BURGDOR IGM SER IA-ACNC: <0.8 INDEX (ref 0–0.79)
BACTERIA UR QL AUTO: ABNORMAL /HPF
BASOPHILS # BLD AUTO: 0.05 10*3/MM3 (ref 0–0.2)
BASOPHILS NFR BLD AUTO: 0.7 % (ref 0–1.5)
BILIRUB SERPL-MCNC: 0.4 MG/DL (ref 0–1.2)
BILIRUB UR QL STRIP: NEGATIVE
BUN SERPL-MCNC: 10 MG/DL (ref 6–20)
BUN/CREAT SERPL: 14.1 (ref 7–25)
C3 SERPL-MCNC: 170 MG/DL (ref 82–167)
C4 SERPL-MCNC: 24 MG/DL (ref 12–38)
CALCIUM SPEC-SCNC: 10.1 MG/DL (ref 8.6–10.5)
CCP IGA+IGG SERPL IA-ACNC: 7 UNITS (ref 0–19)
CHLORIDE SERPL-SCNC: 101 MMOL/L (ref 98–107)
CLARITY UR: ABNORMAL
CO2 SERPL-SCNC: 25.8 MMOL/L (ref 22–29)
COLOR UR: YELLOW
CREAT SERPL-MCNC: 0.71 MG/DL (ref 0.57–1)
CRP SERPL-MCNC: <0.3 MG/DL (ref 0–0.5)
DEPRECATED RDW RBC AUTO: 42.5 FL (ref 37–54)
EOSINOPHIL # BLD AUTO: 0.08 10*3/MM3 (ref 0–0.4)
EOSINOPHIL NFR BLD AUTO: 1 % (ref 0.3–6.2)
ERYTHROCYTE [DISTWIDTH] IN BLOOD BY AUTOMATED COUNT: 12.2 % (ref 12.3–15.4)
ERYTHROCYTE [SEDIMENTATION RATE] IN BLOOD: 13 MM/HR (ref 0–20)
GFR SERPL CREATININE-BSD FRML MDRD: 87 ML/MIN/1.73
GLOBULIN UR ELPH-MCNC: 3.4 GM/DL
GLUCOSE SERPL-MCNC: 88 MG/DL (ref 65–99)
GLUCOSE UR STRIP-MCNC: NEGATIVE MG/DL
HCT VFR BLD AUTO: 46.2 % (ref 34–46.6)
HGB BLD-MCNC: 15.4 G/DL (ref 12–15.9)
HGB UR QL STRIP.AUTO: ABNORMAL
HYALINE CASTS UR QL AUTO: ABNORMAL /LPF
IMM GRANULOCYTES # BLD AUTO: 0.01 10*3/MM3 (ref 0–0.05)
IMM GRANULOCYTES NFR BLD AUTO: 0.1 % (ref 0–0.5)
KETONES UR QL STRIP: NEGATIVE
LEUKOCYTE ESTERASE UR QL STRIP.AUTO: ABNORMAL
LYMPHOCYTES # BLD AUTO: 1.34 10*3/MM3 (ref 0.7–3.1)
LYMPHOCYTES NFR BLD AUTO: 17.6 % (ref 19.6–45.3)
MCH RBC QN AUTO: 31.6 PG (ref 26.6–33)
MCHC RBC AUTO-ENTMCNC: 33.3 G/DL (ref 31.5–35.7)
MCV RBC AUTO: 94.7 FL (ref 79–97)
MONOCYTES # BLD AUTO: 0.33 10*3/MM3 (ref 0.1–0.9)
MONOCYTES NFR BLD AUTO: 4.3 % (ref 5–12)
NEUTROPHILS NFR BLD AUTO: 5.81 10*3/MM3 (ref 1.7–7)
NEUTROPHILS NFR BLD AUTO: 76.3 % (ref 42.7–76)
NITRITE UR QL STRIP: NEGATIVE
NRBC BLD AUTO-RTO: 0 /100 WBC (ref 0–0.2)
PH UR STRIP.AUTO: 6 [PH] (ref 5–8)
PLATELET # BLD AUTO: 335 10*3/MM3 (ref 140–450)
PMV BLD AUTO: 10 FL (ref 6–12)
POTASSIUM SERPL-SCNC: 3.7 MMOL/L (ref 3.5–5.2)
PROT SERPL-MCNC: 8.3 G/DL (ref 6–8.5)
PROT UR QL STRIP: NEGATIVE
RBC # BLD AUTO: 4.88 10*6/MM3 (ref 3.77–5.28)
RBC # UR: ABNORMAL /HPF
REF LAB TEST METHOD: ABNORMAL
SODIUM SERPL-SCNC: 140 MMOL/L (ref 136–145)
SP GR UR STRIP: 1.02 (ref 1–1.03)
SQUAMOUS #/AREA URNS HPF: ABNORMAL /HPF
TSH SERPL DL<=0.05 MIU/L-ACNC: 0.98 UIU/ML (ref 0.27–4.2)
URATE SERPL-MCNC: 4.6 MG/DL (ref 2.4–5.7)
UROBILINOGEN UR QL STRIP: ABNORMAL
WBC # BLD AUTO: 7.62 10*3/MM3 (ref 3.4–10.8)
WBC UR QL AUTO: ABNORMAL /HPF

## 2021-07-10 LAB — BACTERIA SPEC AEROBE CULT: NORMAL

## 2021-07-12 ENCOUNTER — HOSPITAL ENCOUNTER (OUTPATIENT)
Dept: MRI IMAGING | Facility: HOSPITAL | Age: 51
Discharge: HOME | End: 2021-07-12
Admitting: INTERNAL MEDICINE

## 2021-07-12 LAB — MOG AB SER QL CBA IFA: NEGATIVE

## 2021-07-12 PROCEDURE — 75561 CARDIAC MRI FOR MORPH W/DYE: CPT | Performed by: INTERNAL MEDICINE

## 2021-07-12 PROCEDURE — 0 GADOBENATE DIMEGLUMINE 529 MG/ML SOLUTION: Performed by: INTERNAL MEDICINE

## 2021-07-12 PROCEDURE — 75561 CARDIAC MRI FOR MORPH W/DYE: CPT

## 2021-07-12 PROCEDURE — A9577 INJ MULTIHANCE: HCPCS | Performed by: INTERNAL MEDICINE

## 2021-07-12 RX ADMIN — GADOBENATE DIMEGLUMINE 14 ML: 529 INJECTION, SOLUTION INTRAVENOUS at 10:45

## 2021-07-13 LAB — HLA-B27 QL NAA+PROBE: NEGATIVE

## 2021-07-18 LAB — AQP4 H2O CHANNEL IGG SERPL QL: NEGATIVE

## 2021-07-21 RX ORDER — SERTRALINE HYDROCHLORIDE 25 MG/1
25 TABLET, FILM COATED ORAL DAILY
Qty: 90 TABLET | Refills: 3 | Status: SHIPPED | OUTPATIENT
Start: 2021-07-21 | End: 2021-11-12 | Stop reason: SDUPTHER

## 2021-07-29 ENCOUNTER — PATIENT MESSAGE (OUTPATIENT)
Dept: FAMILY MEDICINE CLINIC | Facility: CLINIC | Age: 51
End: 2021-07-29

## 2021-07-29 DIAGNOSIS — U09.9 POST-ACUTE COVID-19 SYNDROME: ICD-10-CM

## 2021-07-29 DIAGNOSIS — U07.1 COVID-19 VIRUS DETECTED: Primary | ICD-10-CM

## 2021-07-29 NOTE — TELEPHONE ENCOUNTER
From: Vibha Darling  To: Jazmyne Sanchez MD  Sent: 7/29/2021 10:05 AM EDT  Subject: Non-Urgent Medical Question    Hi Dr Sanchez,   My oncologist has approved me to get the vaccine but she also wanted to me reach out to you to make sure you do not have any reservations for me getting it. Also I seen the post covid clinic and the only recommendations were sleep hygiene measures and neurologist f/u. My next set of PET scans are next week. I also discussed with them about decreasing the lyrica to only 25 mg daily it makes me very tired and they wanted me to reach out to you and see if this is a medication you can fill for me. I also restarted PT this week. Lastly, I also need a [facility] but [facility]   Thank you so much for everything you do for me! Have a great day!  Vibha

## 2021-07-30 DIAGNOSIS — M54.14 THORACIC RADICULOPATHY: Primary | ICD-10-CM

## 2021-07-30 RX ORDER — PREGABALIN 25 MG/1
25 CAPSULE ORAL 2 TIMES DAILY
Qty: 60 CAPSULE | Refills: 3 | Status: SHIPPED | OUTPATIENT
Start: 2021-07-30 | End: 2021-11-12

## 2021-08-09 ENCOUNTER — LAB (OUTPATIENT)
Dept: FAMILY MEDICINE CLINIC | Facility: CLINIC | Age: 51
End: 2021-08-09

## 2021-08-09 PROCEDURE — 86769 SARS-COV-2 COVID-19 ANTIBODY: CPT | Performed by: FAMILY MEDICINE

## 2021-08-09 PROCEDURE — 36415 COLL VENOUS BLD VENIPUNCTURE: CPT | Performed by: FAMILY MEDICINE

## 2021-08-12 RX ORDER — CHOLESTYRAMINE 4 G/5.5G
POWDER, FOR SUSPENSION ORAL
Qty: 693 G | Refills: 1 | Status: SHIPPED | OUTPATIENT
Start: 2021-08-12 | End: 2021-09-09

## 2021-08-13 DIAGNOSIS — I10 ESSENTIAL HYPERTENSION: Primary | ICD-10-CM

## 2021-08-13 NOTE — TELEPHONE ENCOUNTER
----- Message from Vibha Darling sent at 8/10/2021  9:36 AM EDT -----  Regarding: Prescription Question  Contact: [phone]  Dr Ambrose,  Hi I wanted to see if it’s ok to refill the Lopressoe? I went back to this about two weeks ago and stopped the Cardizem. I feel like I had less side effects from the Lopressor and it is working very well and the fatigue has not been near as bad this time. I’ve been taking 12.5 mg BID. Please let me know your recommendations.   Thank you so much,  Vibha

## 2021-09-03 RX ORDER — AZITHROMYCIN 250 MG/1
TABLET, FILM COATED ORAL
Qty: 6 TABLET | Refills: 0 | Status: SHIPPED | OUTPATIENT
Start: 2021-09-03 | End: 2021-09-24

## 2021-09-09 RX ORDER — CHOLESTYRAMINE 4 G/5.5G
POWDER, FOR SUSPENSION ORAL
Qty: 462 G | Refills: 2 | Status: SHIPPED | OUTPATIENT
Start: 2021-09-09 | End: 2021-11-21 | Stop reason: SDUPTHER

## 2021-09-21 ENCOUNTER — CLINICAL SUPPORT (OUTPATIENT)
Dept: FAMILY MEDICINE CLINIC | Facility: CLINIC | Age: 51
End: 2021-09-21

## 2021-09-21 DIAGNOSIS — R30.0 DYSURIA: Primary | ICD-10-CM

## 2021-09-21 DIAGNOSIS — R53.83 FATIGUE, UNSPECIFIED TYPE: ICD-10-CM

## 2021-09-21 DIAGNOSIS — R30.0 DYSURIA: ICD-10-CM

## 2021-09-21 LAB
ALBUMIN SERPL-MCNC: 4.7 G/DL (ref 3.5–5.2)
ALBUMIN/GLOB SERPL: 1.7 G/DL
ALP SERPL-CCNC: 81 U/L (ref 39–117)
ALT SERPL W P-5'-P-CCNC: 19 U/L (ref 1–33)
ANION GAP SERPL CALCULATED.3IONS-SCNC: 13 MMOL/L (ref 5–15)
AST SERPL-CCNC: 14 U/L (ref 1–32)
BACTERIA UR QL AUTO: ABNORMAL /HPF
BASOPHILS # BLD AUTO: 0.1 10*3/MM3 (ref 0–0.2)
BASOPHILS NFR BLD AUTO: 1.3 % (ref 0–1.5)
BILIRUB SERPL-MCNC: 0.3 MG/DL (ref 0–1.2)
BILIRUB UR QL STRIP: NEGATIVE
BUN SERPL-MCNC: 9 MG/DL (ref 6–20)
BUN/CREAT SERPL: 11.7 (ref 7–25)
CALCIUM SPEC-SCNC: 9.7 MG/DL (ref 8.6–10.5)
CHLORIDE SERPL-SCNC: 101 MMOL/L (ref 98–107)
CLARITY UR: CLEAR
CO2 SERPL-SCNC: 28 MMOL/L (ref 22–29)
COLOR UR: ABNORMAL
CREAT SERPL-MCNC: 0.77 MG/DL (ref 0.57–1)
DEPRECATED RDW RBC AUTO: 41.6 FL (ref 37–54)
EOSINOPHIL # BLD AUTO: 0.1 10*3/MM3 (ref 0–0.4)
EOSINOPHIL NFR BLD AUTO: 1.7 % (ref 0.3–6.2)
ERYTHROCYTE [DISTWIDTH] IN BLOOD BY AUTOMATED COUNT: 12.7 % (ref 12.3–15.4)
GFR SERPL CREATININE-BSD FRML MDRD: 79 ML/MIN/1.73
GLOBULIN UR ELPH-MCNC: 2.7 GM/DL
GLUCOSE SERPL-MCNC: 102 MG/DL (ref 65–99)
GLUCOSE UR STRIP-MCNC: NEGATIVE MG/DL
HCT VFR BLD AUTO: 42.5 % (ref 34–46.6)
HGB BLD-MCNC: 14.1 G/DL (ref 12–15.9)
HGB UR QL STRIP.AUTO: ABNORMAL
HYALINE CASTS UR QL AUTO: ABNORMAL /LPF
KETONES UR QL STRIP: NEGATIVE
LEUKOCYTE ESTERASE UR QL STRIP.AUTO: NEGATIVE
LYMPHOCYTES # BLD AUTO: 2 10*3/MM3 (ref 0.7–3.1)
LYMPHOCYTES NFR BLD AUTO: 30.3 % (ref 19.6–45.3)
MCH RBC QN AUTO: 30.8 PG (ref 26.6–33)
MCHC RBC AUTO-ENTMCNC: 33.2 G/DL (ref 31.5–35.7)
MCV RBC AUTO: 92.8 FL (ref 79–97)
MONOCYTES # BLD AUTO: 0.4 10*3/MM3 (ref 0.1–0.9)
MONOCYTES NFR BLD AUTO: 5.7 % (ref 5–12)
NEUTROPHILS NFR BLD AUTO: 4.1 10*3/MM3 (ref 1.7–7)
NEUTROPHILS NFR BLD AUTO: 61 % (ref 42.7–76)
NITRITE UR QL STRIP: POSITIVE
NRBC BLD AUTO-RTO: 0.4 /100 WBC (ref 0–0.2)
PH UR STRIP.AUTO: 5.5 [PH] (ref 5–8)
PLATELET # BLD AUTO: 336 10*3/MM3 (ref 140–450)
PMV BLD AUTO: 7.6 FL (ref 6–12)
POTASSIUM SERPL-SCNC: 3.9 MMOL/L (ref 3.5–5.2)
PROT SERPL-MCNC: 7.4 G/DL (ref 6–8.5)
PROT UR QL STRIP: NEGATIVE
RBC # BLD AUTO: 4.59 10*6/MM3 (ref 3.77–5.28)
RBC # UR: ABNORMAL /HPF
REF LAB TEST METHOD: ABNORMAL
SODIUM SERPL-SCNC: 142 MMOL/L (ref 136–145)
SP GR UR STRIP: 1.02 (ref 1–1.03)
SQUAMOUS #/AREA URNS HPF: ABNORMAL /HPF
UROBILINOGEN UR QL STRIP: ABNORMAL
WBC # BLD AUTO: 6.7 10*3/MM3 (ref 3.4–10.8)
WBC UR QL AUTO: ABNORMAL /HPF

## 2021-09-21 PROCEDURE — 36415 COLL VENOUS BLD VENIPUNCTURE: CPT | Performed by: FAMILY MEDICINE

## 2021-09-21 PROCEDURE — 85025 COMPLETE CBC W/AUTO DIFF WBC: CPT | Performed by: FAMILY MEDICINE

## 2021-09-21 PROCEDURE — 81001 URINALYSIS AUTO W/SCOPE: CPT | Performed by: FAMILY MEDICINE

## 2021-09-21 PROCEDURE — 80053 COMPREHEN METABOLIC PANEL: CPT | Performed by: FAMILY MEDICINE

## 2021-09-21 NOTE — PROGRESS NOTES
Venipuncture Blood Specimen Collection  Venipuncture performed in anterior (R) FA with butterfly by Lisa Cole LPN with good hemostasis. Patient tolerated the procedure well without complications.   09/21/21   Lisa Cole LPN

## 2021-09-22 DIAGNOSIS — N30.01 ACUTE CYSTITIS WITH HEMATURIA: Primary | ICD-10-CM

## 2021-09-22 RX ORDER — SULFAMETHOXAZOLE AND TRIMETHOPRIM 800; 160 MG/1; MG/1
1 TABLET ORAL 2 TIMES DAILY
Qty: 14 TABLET | Refills: 0 | Status: SHIPPED | OUTPATIENT
Start: 2021-09-22 | End: 2021-09-29

## 2021-09-24 ENCOUNTER — TELEMEDICINE (OUTPATIENT)
Dept: FAMILY MEDICINE CLINIC | Facility: CLINIC | Age: 51
End: 2021-09-24

## 2021-09-24 ENCOUNTER — CLINICAL SUPPORT (OUTPATIENT)
Dept: FAMILY MEDICINE CLINIC | Facility: CLINIC | Age: 51
End: 2021-09-24

## 2021-09-24 VITALS — HEART RATE: 72 BPM | OXYGEN SATURATION: 98 % | TEMPERATURE: 97.8 F

## 2021-09-24 DIAGNOSIS — R09.82 PND (POST-NASAL DRIP): ICD-10-CM

## 2021-09-24 DIAGNOSIS — R53.83 FATIGUE, UNSPECIFIED TYPE: ICD-10-CM

## 2021-09-24 DIAGNOSIS — R06.02 SOB (SHORTNESS OF BREATH): ICD-10-CM

## 2021-09-24 DIAGNOSIS — R06.02 SOB (SHORTNESS OF BREATH): Primary | ICD-10-CM

## 2021-09-24 LAB
D DIMER PPP FEU-MCNC: <0.19 MG/L (FEU) (ref 0–0.59)
SARS-COV-2 ORF1AB RESP QL NAA+PROBE: NOT DETECTED

## 2021-09-24 PROCEDURE — 36415 COLL VENOUS BLD VENIPUNCTURE: CPT | Performed by: NURSE PRACTITIONER

## 2021-09-24 PROCEDURE — 85379 FIBRIN DEGRADATION QUANT: CPT | Performed by: NURSE PRACTITIONER

## 2021-09-24 PROCEDURE — U0004 COV-19 TEST NON-CDC HGH THRU: HCPCS | Performed by: NURSE PRACTITIONER

## 2021-09-24 PROCEDURE — 36415 COLL VENOUS BLD VENIPUNCTURE: CPT

## 2021-09-24 PROCEDURE — 99214 OFFICE O/P EST MOD 30 MIN: CPT | Performed by: NURSE PRACTITIONER

## 2021-09-24 NOTE — PROGRESS NOTES
"Chief Complaint  Nasal Congestion, Fatigue, and Shortness of Breath    \"I have cold symptoms, shortness of breath, and I need to figure out what's going on.\"      Subjective          Vibha Darling presents to [facility]     History of Present Illness    This was an audio and video enabled telemedicine encounter. Consent received to perform video with audio encounter.     51-year-old female patient presents today via video encounter for cold-like symptoms x2 weeks.  She reports that she received her second Covid 19 vaccination 2 weeks ago and developed the symptoms 3 days after the vaccine.  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  She reports that she took a home test and it was negative yesterday but would like a confirmatory test.    Patient also reports she has some very mild shortness of breath and she is not sure if it is from anxiety or fatigue or something else altogether.  She has had a history of PEs in the past and is worried she may have a blood clot.  Patient is requesting a chest x-ray and a D-dimer just to check because she had so many issues after having Covid last year and was to be on top of things.    No vitals were assessed at today's visit as this was a video encounter.  He is reports she has been afebrile.      Objective   Vital Signs:   Pulse 72   Temp 97.8 °F (36.6 °C) (Oral)   SpO2 98%     Physical Exam  Constitutional:       Appearance: Normal appearance. She is well-developed.      Comments: Wearing a face mask     HENT:      Head: Normocephalic and atraumatic.   Eyes:      Conjunctiva/sclera: Conjunctivae normal.   Pulmonary:      Effort: Pulmonary effort is normal.   Musculoskeletal:         General: Normal range of motion.      Cervical back: Normal range of motion.   Skin:     Findings: No rash.   Neurological:      Mental Status: She is alert and oriented to person, place, " and time.   Psychiatric:         Mood and Affect: Mood normal.         Behavior: Behavior normal.       Physical was performed via video.     Result Review :{Labs  Result Review  Imaging  Med Tab  Media  Procedures :23}                 Assessment and Plan    Diagnoses and all orders for this visit:    1. SOB (shortness of breath) (Primary)  -     XR Chest AP (In Office); Future  -     D-dimer, Quantitative; Future  -     COVID-19,[facility] ROUTINE, NP/OP SWAB IN TRANSPORT MEDIA OR ESWAB 72 HR TAT - Swab, Anterior nasal; Future    2. PND (post-nasal drip)  -     COVID-19,[facility] ROUTINE, NP/OP SWAB IN TRANSPORT MEDIA OR ESWAB 72 HR TAT - Swab, Anterior nasal; Future    3. Fatigue, unspecified type  -     COVID-19,[facility] ROUTINE, NP/OP SWAB IN TRANSPORT MEDIA OR ESWAB 72 HR TAT - Swab, Anterior nasal; Future        Follow Up   No follow-ups on file.  Patient was given instructions and counseling regarding her condition or for health maintenance advice. Please see specific information pulled into the AVS if appropriate.

## 2021-09-27 ENCOUNTER — TELEPHONE (OUTPATIENT)
Dept: FAMILY MEDICINE CLINIC | Facility: CLINIC | Age: 51
End: 2021-09-27

## 2021-09-27 NOTE — PROGRESS NOTES
Venipuncture Blood Specimen Collection  Venipuncture performed in RAC by Ho Maldonado MA with good hemostasis. Patient tolerated the procedure well without complications.   09/24/21   Ho Maldonado MA  
Please notify Vibha that her Covid swab was negative and her dd-robin was normal. How is she feeling today?  
[Negative] : Heme/Lymph

## 2021-09-27 NOTE — TELEPHONE ENCOUNTER
HUB TO READ     MY CHART MESSAGE     Please notify Vibha that her Covid swab was negative and her dd-robin was normal. How is she feeling today?

## 2021-10-01 ENCOUNTER — TELEPHONE (OUTPATIENT)
Dept: CARDIOLOGY | Facility: CLINIC | Age: 51
End: 2021-10-01

## 2021-10-07 ENCOUNTER — TELEMEDICINE (OUTPATIENT)
Dept: FAMILY MEDICINE CLINIC | Facility: CLINIC | Age: 51
End: 2021-10-07

## 2021-10-07 DIAGNOSIS — U09.9 POST-COVID-19 SYNDROME MANIFESTING AS CHRONIC HEADACHE: Primary | ICD-10-CM

## 2021-10-07 DIAGNOSIS — G89.29 POST-COVID-19 SYNDROME MANIFESTING AS CHRONIC HEADACHE: Primary | ICD-10-CM

## 2021-10-07 DIAGNOSIS — R51.9 POST-COVID-19 SYNDROME MANIFESTING AS CHRONIC HEADACHE: Primary | ICD-10-CM

## 2021-10-07 PROCEDURE — 99213 OFFICE O/P EST LOW 20 MIN: CPT | Performed by: NURSE PRACTITIONER

## 2021-10-07 NOTE — PROGRESS NOTES
"Chief Complaint  Nasal Congestion (STARTED BACK 2 DAYS AGO), Earache (RIGHT), and Sinus Problem    \"My right face is having all sorts of issues\"    You have chosen to receive care through a telehealth visit.  Do you consent to use a video/audio connection for your medical care today? Yes    Subjective     {Problem List  Visit Diagnosis   Encounters  Notes  Medications  Labs  Result Review Imaging  Media :23}     Vibha Darling presents to [facility]     51-year-old female patient presents today via video chat. 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    Patient reports that 3 to 4 weeks ago he was having some issues with her sinuses and was placed on a Z-Carl. She finished a Z-Carl and felt great for about 2 weeks. 4 days ago she began developing symptoms including right otalgia, right orbital headache, and right sinus pressure with postnasal drip.     She reports that when she applies a heating pad, it helps alleviate her headache. She has taken Tylenol without much relief. She reports that when she is lying down and sits upright she becomes slightly dizzy. She reports she has a history of BPV in which she completed physical therapy for and had great benefit from.The dizziness does not linger once she gathers herself. She reports her blood pressure has been stable at 120/70-80. She denies any visual disturbances or changes. She denies any chest pain. She reports she has been taking Flonase and saline OTC for the sinus congestion however, she reports this makes the dizziness worse and the Flonase makes her cough. Patient reports she recently went to cardiology who reported everything was well.    Patient has had Covid infection within the past year as well as radiation for cancer. She reports \"I have good days and then bad days \". Patient denies any fevers, nausea, vomiting, appetite " "changes. She reports \"when I get sick is always on the right side of my face \".    She sees a post Covid provider in December whom seen her in July.     No vitals obtained at this visit due to Zoom call.             Objective   Vital Signs:   There were no vitals taken for this visit.      Physical exam performed via Zoom call.    Physical Exam  Constitutional:       General: She is not in acute distress.     Appearance: Normal appearance. She is well-developed. She is not ill-appearing or diaphoretic.      Comments: Wearing a face mask     HENT:      Head: Normocephalic and atraumatic.   Eyes:      Conjunctiva/sclera: Conjunctivae normal.   Pulmonary:      Effort: Pulmonary effort is normal. No respiratory distress.   Musculoskeletal:         General: Normal range of motion.      Cervical back: Normal range of motion.   Skin:     Findings: No rash.   Neurological:      Mental Status: She is alert and oriented to person, place, and time.   Psychiatric:         Behavior: Behavior normal.        Result Review :                 Assessment and Plan    Diagnoses and all orders for this visit:    1. Post-COVID-19 syndrome manifesting as chronic headache (Primary)  Assessment & Plan:  Headaches are coming and going.  Advised to keep a headache diary.  Counseled regarding lifestyle modifications.  Follow up in 2 weeks, or sooner should new symptoms or problems arise.     Patient encouraged to come to the office or ER if symptoms worsen or continue to persist past 2 weeks. Encouraged her to follow-up with her post Covid specialist in December as planned. Patient to take Tylenol and ibuprofen and heat application as tolerated for headaches            Follow Up   Return for Next scheduled follow up.  Patient was given instructions and counseling regarding her condition or for health maintenance advice. Please see specific information pulled into the AVS if appropriate.       "

## 2021-10-07 NOTE — ASSESSMENT & PLAN NOTE
Headaches are coming and going.  Advised to keep a headache diary.  Counseled regarding lifestyle modifications.  Follow up in 2 weeks, or sooner should new symptoms or problems arise.     Patient encouraged to come to the office or ER if symptoms worsen or continue to persist past 2 weeks. Encouraged her to follow-up with her post Covid specialist in December as planned. Patient to take Tylenol and ibuprofen and heat application as tolerated for headaches

## 2021-10-29 ENCOUNTER — TELEPHONE (OUTPATIENT)
Dept: FAMILY MEDICINE CLINIC | Facility: CLINIC | Age: 51
End: 2021-10-29

## 2021-10-29 ENCOUNTER — CLINICAL SUPPORT (OUTPATIENT)
Dept: FAMILY MEDICINE CLINIC | Facility: CLINIC | Age: 51
End: 2021-10-29

## 2021-10-29 DIAGNOSIS — U09.9 CHRONIC POST-COVID-19 SYNDROME: ICD-10-CM

## 2021-10-29 DIAGNOSIS — B97.89 OTHER VIRAL AGENTS AS THE CAUSE OF DISEASES CLASSIFIED ELSEWHERE: ICD-10-CM

## 2021-10-29 DIAGNOSIS — R30.0 DYSURIA: ICD-10-CM

## 2021-10-29 DIAGNOSIS — U09.9 CHRONIC POST-COVID-19 SYNDROME: Primary | ICD-10-CM

## 2021-10-29 LAB
ALBUMIN SERPL-MCNC: 4.9 G/DL (ref 3.5–5.2)
ALBUMIN/GLOB SERPL: 1.6 G/DL
ALP SERPL-CCNC: 82 U/L (ref 39–117)
ALT SERPL W P-5'-P-CCNC: 30 U/L (ref 1–33)
ANION GAP SERPL CALCULATED.3IONS-SCNC: 9.8 MMOL/L (ref 5–15)
AST SERPL-CCNC: 20 U/L (ref 1–32)
BACTERIA UR QL AUTO: ABNORMAL /HPF
BASOPHILS # BLD AUTO: 0.03 10*3/MM3 (ref 0–0.2)
BASOPHILS NFR BLD AUTO: 0.5 % (ref 0–1.5)
BILIRUB SERPL-MCNC: 0.3 MG/DL (ref 0–1.2)
BILIRUB UR QL STRIP: NEGATIVE
BUN SERPL-MCNC: 10 MG/DL (ref 6–20)
BUN/CREAT SERPL: 13.5 (ref 7–25)
CALCIUM SPEC-SCNC: 10.1 MG/DL (ref 8.6–10.5)
CHLORIDE SERPL-SCNC: 100 MMOL/L (ref 98–107)
CHROMATIN AB SERPL-ACNC: <10 IU/ML (ref 0–14)
CLARITY UR: CLEAR
CO2 SERPL-SCNC: 26.2 MMOL/L (ref 22–29)
COLOR UR: YELLOW
CREAT SERPL-MCNC: 0.74 MG/DL (ref 0.57–1)
CRP SERPL-MCNC: <0.3 MG/DL (ref 0–0.5)
D DIMER PPP FEU-MCNC: <0.19 MG/L (FEU) (ref 0–0.59)
DEPRECATED RDW RBC AUTO: 41.3 FL (ref 37–54)
EOSINOPHIL # BLD AUTO: 0.06 10*3/MM3 (ref 0–0.4)
EOSINOPHIL NFR BLD AUTO: 1 % (ref 0.3–6.2)
ERYTHROCYTE [DISTWIDTH] IN BLOOD BY AUTOMATED COUNT: 12.2 % (ref 12.3–15.4)
ERYTHROCYTE [SEDIMENTATION RATE] IN BLOOD: 29 MM/HR (ref 0–30)
GFR SERPL CREATININE-BSD FRML MDRD: 83 ML/MIN/1.73
GLOBULIN UR ELPH-MCNC: 3.1 GM/DL
GLUCOSE SERPL-MCNC: 94 MG/DL (ref 65–99)
GLUCOSE UR STRIP-MCNC: NEGATIVE MG/DL
HCT VFR BLD AUTO: 44.2 % (ref 34–46.6)
HGB BLD-MCNC: 14.5 G/DL (ref 12–15.9)
HGB UR QL STRIP.AUTO: ABNORMAL
HYALINE CASTS UR QL AUTO: ABNORMAL /LPF
IMM GRANULOCYTES # BLD AUTO: 0.01 10*3/MM3 (ref 0–0.05)
IMM GRANULOCYTES NFR BLD AUTO: 0.2 % (ref 0–0.5)
KETONES UR QL STRIP: NEGATIVE
LEUKOCYTE ESTERASE UR QL STRIP.AUTO: ABNORMAL
LYMPHOCYTES # BLD AUTO: 1.78 10*3/MM3 (ref 0.7–3.1)
LYMPHOCYTES NFR BLD AUTO: 29.9 % (ref 19.6–45.3)
MCH RBC QN AUTO: 30.3 PG (ref 26.6–33)
MCHC RBC AUTO-ENTMCNC: 32.8 G/DL (ref 31.5–35.7)
MCV RBC AUTO: 92.3 FL (ref 79–97)
MONOCYTES # BLD AUTO: 0.36 10*3/MM3 (ref 0.1–0.9)
MONOCYTES NFR BLD AUTO: 6 % (ref 5–12)
NEUTROPHILS NFR BLD AUTO: 3.72 10*3/MM3 (ref 1.7–7)
NEUTROPHILS NFR BLD AUTO: 62.4 % (ref 42.7–76)
NITRITE UR QL STRIP: NEGATIVE
NRBC BLD AUTO-RTO: 0 /100 WBC (ref 0–0.2)
PH UR STRIP.AUTO: 6 [PH] (ref 5–8)
PLATELET # BLD AUTO: 322 10*3/MM3 (ref 140–450)
PMV BLD AUTO: 10 FL (ref 6–12)
POTASSIUM SERPL-SCNC: 3.5 MMOL/L (ref 3.5–5.2)
PROT SERPL-MCNC: 8 G/DL (ref 6–8.5)
PROT UR QL STRIP: NEGATIVE
RBC # BLD AUTO: 4.79 10*6/MM3 (ref 3.77–5.28)
RBC # UR: ABNORMAL /HPF
REF LAB TEST METHOD: ABNORMAL
SODIUM SERPL-SCNC: 136 MMOL/L (ref 136–145)
SP GR UR STRIP: 1.01 (ref 1–1.03)
SQUAMOUS #/AREA URNS HPF: ABNORMAL /HPF
T4 FREE SERPL-MCNC: 1.35 NG/DL (ref 0.93–1.7)
T4 SERPL-MCNC: 10.4 MCG/DL (ref 4.5–11.7)
TSH SERPL DL<=0.05 MIU/L-ACNC: 1.45 UIU/ML (ref 0.27–4.2)
UROBILINOGEN UR QL STRIP: ABNORMAL
WBC # BLD AUTO: 5.96 10*3/MM3 (ref 3.4–10.8)
WBC UR QL AUTO: ABNORMAL /HPF

## 2021-10-29 PROCEDURE — 86140 C-REACTIVE PROTEIN: CPT | Performed by: NURSE PRACTITIONER

## 2021-10-29 PROCEDURE — 84436 ASSAY OF TOTAL THYROXINE: CPT | Performed by: NURSE PRACTITIONER

## 2021-10-29 PROCEDURE — 82607 VITAMIN B-12: CPT | Performed by: NURSE PRACTITIONER

## 2021-10-29 PROCEDURE — 36415 COLL VENOUS BLD VENIPUNCTURE: CPT | Performed by: NURSE PRACTITIONER

## 2021-10-29 PROCEDURE — 86664 EPSTEIN-BARR NUCLEAR ANTIGEN: CPT | Performed by: NURSE PRACTITIONER

## 2021-10-29 PROCEDURE — 85025 COMPLETE CBC W/AUTO DIFF WBC: CPT | Performed by: NURSE PRACTITIONER

## 2021-10-29 PROCEDURE — 84439 ASSAY OF FREE THYROXINE: CPT | Performed by: NURSE PRACTITIONER

## 2021-10-29 PROCEDURE — 83540 ASSAY OF IRON: CPT | Performed by: NURSE PRACTITIONER

## 2021-10-29 PROCEDURE — 81001 URINALYSIS AUTO W/SCOPE: CPT | Performed by: NURSE PRACTITIONER

## 2021-10-29 PROCEDURE — 80053 COMPREHEN METABOLIC PANEL: CPT | Performed by: NURSE PRACTITIONER

## 2021-10-29 PROCEDURE — 86665 EPSTEIN-BARR CAPSID VCA: CPT | Performed by: NURSE PRACTITIONER

## 2021-10-29 PROCEDURE — 85045 AUTOMATED RETICULOCYTE COUNT: CPT | Performed by: NURSE PRACTITIONER

## 2021-10-29 PROCEDURE — 85652 RBC SED RATE AUTOMATED: CPT | Performed by: NURSE PRACTITIONER

## 2021-10-29 PROCEDURE — 86431 RHEUMATOID FACTOR QUANT: CPT | Performed by: NURSE PRACTITIONER

## 2021-10-29 PROCEDURE — 86200 CCP ANTIBODY: CPT | Performed by: NURSE PRACTITIONER

## 2021-10-29 PROCEDURE — 84443 ASSAY THYROID STIM HORMONE: CPT | Performed by: NURSE PRACTITIONER

## 2021-10-29 PROCEDURE — 82728 ASSAY OF FERRITIN: CPT | Performed by: NURSE PRACTITIONER

## 2021-10-29 PROCEDURE — 83550 IRON BINDING TEST: CPT | Performed by: NURSE PRACTITIONER

## 2021-10-29 PROCEDURE — 86038 ANTINUCLEAR ANTIBODIES: CPT | Performed by: NURSE PRACTITIONER

## 2021-10-29 PROCEDURE — 82746 ASSAY OF FOLIC ACID SERUM: CPT | Performed by: NURSE PRACTITIONER

## 2021-10-29 PROCEDURE — 85379 FIBRIN DEGRADATION QUANT: CPT | Performed by: NURSE PRACTITIONER

## 2021-10-30 LAB
EBV NA IGG SER IA-ACNC: >600 U/ML (ref 0–17.9)
EBV VCA IGG SER IA-ACNC: 221 U/ML (ref 0–17.9)
EBV VCA IGM SER IA-ACNC: <36 U/ML (ref 0–35.9)
SERVICE CMNT-IMP: ABNORMAL

## 2021-10-31 LAB — CCP IGA+IGG SERPL IA-ACNC: 7 UNITS (ref 0–19)

## 2021-11-01 LAB
ANA SER QL: NEGATIVE
BASOPHILS # BLD AUTO: NORMAL 10*3/UL
BASOPHILS NFR BLD AUTO: NORMAL %
EOSINOPHIL # BLD AUTO: NORMAL 10*3/UL
EOSINOPHIL NFR BLD AUTO: NORMAL %
ERYTHROCYTE [DISTWIDTH] IN BLOOD BY AUTOMATED COUNT: NORMAL %
FERRITIN SERPL-MCNC: 51 NG/ML (ref 15–150)
FOLATE SERPL-MCNC: 9.7 NG/ML
HCT VFR BLD AUTO: NORMAL %
HGB BLD-MCNC: NORMAL G/DL
IMM GRANULOCYTES # BLD AUTO: NORMAL 10*3/UL
IMM GRANULOCYTES NFR BLD AUTO: NORMAL %
IMMATURE CELLS: NORMAL
IRON SATN MFR SERPL: 20 % (ref 15–55)
IRON SERPL-MCNC: 79 UG/DL (ref 27–159)
LYMPHOCYTES # BLD AUTO: NORMAL 10*3/UL
LYMPHOCYTES NFR BLD AUTO: NORMAL %
MCH RBC QN AUTO: NORMAL PG
MCHC RBC AUTO-ENTMCNC: NORMAL G/DL
MCV RBC AUTO: NORMAL FL
MONOCYTES # BLD AUTO: NORMAL 10*3/UL
MONOCYTES NFR BLD AUTO: NORMAL %
MORPHOLOGY BLD-IMP: NORMAL
NEUTROPHILS # BLD AUTO: NORMAL 10*3/UL
NEUTROPHILS NFR BLD AUTO: NORMAL %
NRBC BLD AUTO-RTO: NORMAL %
PLATELET # BLD AUTO: NORMAL 10*3/UL
RBC # BLD AUTO: NORMAL 10*6/UL
RETICS/RBC NFR AUTO: NORMAL %
TIBC SERPL-MCNC: 393 UG/DL (ref 250–450)
UIBC SERPL-MCNC: 314 UG/DL (ref 131–425)
VIT B12 SERPL-MCNC: 402 PG/ML (ref 232–1245)
WBC # BLD AUTO: NORMAL 10*3/UL

## 2021-11-12 ENCOUNTER — OFFICE VISIT (OUTPATIENT)
Dept: FAMILY MEDICINE CLINIC | Facility: CLINIC | Age: 51
End: 2021-11-12

## 2021-11-12 VITALS
OXYGEN SATURATION: 97 % | TEMPERATURE: 98.2 F | SYSTOLIC BLOOD PRESSURE: 128 MMHG | HEIGHT: 67 IN | WEIGHT: 163 LBS | BODY MASS INDEX: 25.58 KG/M2 | HEART RATE: 74 BPM | DIASTOLIC BLOOD PRESSURE: 72 MMHG

## 2021-11-12 DIAGNOSIS — M50.30 DDD (DEGENERATIVE DISC DISEASE), CERVICAL: Primary | ICD-10-CM

## 2021-11-12 DIAGNOSIS — M48.02 CERVICAL SPINAL STENOSIS: ICD-10-CM

## 2021-11-12 DIAGNOSIS — R00.0 TACHYCARDIA: ICD-10-CM

## 2021-11-12 DIAGNOSIS — E53.8 B12 DEFICIENCY: ICD-10-CM

## 2021-11-12 DIAGNOSIS — U09.9 CHRONIC POST-COVID-19 SYNDROME: ICD-10-CM

## 2021-11-12 PROCEDURE — 99214 OFFICE O/P EST MOD 30 MIN: CPT | Performed by: FAMILY MEDICINE

## 2021-11-12 RX ORDER — KETOROLAC TROMETHAMINE 10 MG/1
10 TABLET, FILM COATED ORAL EVERY 6 HOURS PRN
Qty: 20 TABLET | Refills: 1 | Status: SHIPPED | OUTPATIENT
Start: 2021-11-12 | End: 2021-12-30

## 2021-11-12 RX ORDER — TRAMADOL HYDROCHLORIDE 50 MG/1
50 TABLET ORAL EVERY 6 HOURS PRN
COMMUNITY
End: 2021-11-16

## 2021-11-12 NOTE — PROGRESS NOTES
Subjective   Vibha Darling is a 51 y.o. female.   Chief Complaint   Patient presents with   • Shoulder Pain   • Dizziness       History of Present Illness   Presents to the office today due to recent development of symptoms.  Started maybe a week ago.  Fairly sudden onset of dizziness.  Reportedly almost passed out.  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  Not rotational.  She has had vertigo before.  She told me the episode lasted about 20 minutes and she was still dizzy the next day.  She did have some temporary hearing diminishment.  She had to leave work a few days later because of this.  She is only taking one quarter of a 0.25 mg Xanax at bedtime.  She has had no other new medications.    3 to 4 days ago she developed pain in her shoulders.  She did take at least 1 dose of ibuprofen that did help.  She went to the emergency room in [place] on the ninth.  She had an extensive work-up including multiple radiology studies.    CT chest, abdomen, pelvis-no evidence of metastatic cancer.  Persistent pleural-parenchymal infiltrate in the left apex. (This is actually near the area that she had radiation for her melanoma on her left upper back)    MRI neck-severe degenerative disc disease with facet arthropathy resulting in multilevel spinal stenosis and variable degrees of neuroforaminal compromise.    MRI brain-no acute intracranial findings.    She was discharged home with a diagnosis of shoulder pain.    She tells me that the pain is all across her posterior neck down into both upper shoulders.  Sometimes across the front of her chest up high.  Movements of her neck will worsen this.  She tells me she has to sit leaning forward with her neck slightly extended and she can temporarily relieve this pain.  She has taken ibuprofen on a few occasions and this, also has at least temporarily helped the pain.    She is accompanied by  her daughter today who provides additional history.  Louisa is currently living with her daughter's family and herself provide Louisa with a lot of support.  They carry all of her things for her to and from her car.  If she tries to carry anything very heavy her arms and legs get weak and she feels like she cannot keep going.      Patient Active Problem List    Diagnosis Date Noted   • B12 deficiency 11/13/2021   • Post-COVID-19 syndrome manifesting as chronic headache 10/07/2021   • Fatigue 09/24/2021   • PND (post-nasal drip) 09/24/2021   • SOB (shortness of breath) 09/24/2021   • Post-acute COVID-19 syndrome 07/29/2021   • COVID-19 virus detected 06/21/2021   • Palpitations 06/07/2021   • DDD (degenerative disc disease), cervical 04/19/2021   • Anxiety 02/03/2021   • Multiple subsegmental pulmonary emboli without acute cor pulmonale (HCC) 02/02/2021     Note Last Updated: 2/8/2021     Bilateral - 2/2/21     • Malignant melanoma of torso excluding breast (HCC) 01/21/2021   • Thoracic radiculopathy 11/02/2020   • Inappropriate sinus tachycardia 10/26/2020   • Eosinophilic esophagitis 10/26/2020   • Lumbar degenerative disc disease 09/13/2020     Note Last Updated: 9/13/2020     MRI at [facility] - 7/27/20 - 1. No finding suspicious for metastatic disease  2. Lumbar spondylosis with degenerative disc disease and lateral recess stenosis at L5-S1.     • Melanoma of back (HCC) 07/23/2020   • Melanoma (HCC) 06/17/2020   • Abnormal electrocardiogram (ECG) (EKG) 04/22/2020   • Chest pain, atypical 04/21/2020   • Mixed hyperlipidemia 04/21/2020   • Elevated LFTs 04/21/2020   • Overweight (BMI 25.0-29.9) 04/21/2020   • Generalized anxiety disorder 04/21/2020   • Essential hypertension 05/01/2019           Past Surgical History:   Procedure Laterality Date   • CHOLECYSTECTOMY     • COLONOSCOPY     • DILATATION AND CURETTAGE     • DILATATION AND CURETTAGE  2010   • ECTOPIC PREGNANCY     • ECTOPIC PREGNANCY SURGERY     • SKIN CANCER  EXCISION  07/01/2020     Current Outpatient Medications on File Prior to Visit   Medication Sig   • ALPRAZolam (XANAX) 0.25 MG tablet Take 0.25 mg by mouth As Needed.   • apixaban (ELIQUIS) 5 MG tablet tablet Take 1 tablet by mouth Every 12 (Twelve) Hours.   • cyanocobalamin 1000 MCG/ML injection    • metoprolol tartrate (LOPRESSOR) 25 MG tablet 1/2 tablet po daily   • ondansetron (ZOFRAN) 8 MG tablet Take 8 mg by mouth.   • pantoprazole (PROTONIX) 40 mg in 100mL NS IVPB    • Prevalite 4 GM/DOSE powder DISSOLVE 2 SCOOPS IN 8OZ OF FLUID EVERY DAY   • sertraline (ZOLOFT) 50 MG tablet    • traMADol (ULTRAM) 50 MG tablet Take 50 mg by mouth Every 6 (Six) Hours As Needed for Moderate Pain .     No current facility-administered medications on file prior to visit.     Allergies   Allergen Reactions   • Doxycycline Other (See Comments) and Rash     Flushing     • Dye Fdc Red [Red Dye] Anaphylaxis   • Latex Itching and Rash     Skin peeling   • Levofloxacin Other (See Comments) and Rash     Flushing     • Lisinopril Cough   • Ciprofloxacin Other (See Comments)     Flushing    • Contrast Dye Itching   • Iodinated Diagnostic Agents Itching   • Penicillins Rash     Patient states she can take penicillin-     Social History     Socioeconomic History   • Marital status:    Tobacco Use   • Smoking status: Never Smoker   • Smokeless tobacco: Never Used   Vaping Use   • Vaping Use: Never used   Substance and Sexual Activity   • Alcohol use: No   • Drug use: Never   • Sexual activity: Defer     Family History   Problem Relation Age of Onset   • Heart disease Father    • Supraventricular tachycardia Father    • Heart disease Paternal Aunt    • Heart disease Paternal Uncle    • Thrombophilia Paternal Uncle    • Heart disease Paternal Uncle    • Alcohol abuse Paternal Uncle    • Hypertension Sister    • Hypertension Brother    • Hypertension Sister    • Hypertension Sister        Review of Systems    Objective   /72 (BP  "Location: Right arm, Patient Position: Sitting, Cuff Size: Adult)   Pulse 74   Temp 98.2 °F (36.8 °C) (Oral)   Ht 170.2 cm (67.01\")   Wt 73.9 kg (163 lb)   SpO2 97%   BMI 25.52 kg/m²   Physical Exam  Constitutional:       Appearance: She is well-developed.      Comments: Wearing a face mask  Pleasant, but clearly distraught white female.  No general distress, but she does hold her head very still through most of the visit.   HENT:      Head: Normocephalic and atraumatic.   Eyes:      Conjunctiva/sclera: Conjunctivae normal.   Neck:      Comments: Muscles of the cervical spine are in general very tight.  Cardiovascular:      Rate and Rhythm: Normal rate.   Pulmonary:      Effort: Pulmonary effort is normal.   Musculoskeletal:         General: Normal range of motion.      Cervical back: Normal range of motion.   Skin:     General: Skin is warm and dry.      Coloration: Skin is not pale.      Findings: No rash.   Neurological:      Mental Status: She is alert and oriented to person, place, and time.      Motor: Weakness (general) present.      Gait: Gait abnormal (slow, little unsteady).      Comments: No obvious fasciculations of the muscles of her shoulder girdles.   Psychiatric:         Attention and Perception: Attention normal.         Mood and Affect: Affect is tearful (at times).         Speech: Speech normal.         Behavior: Behavior normal. Behavior is cooperative.         Cognition and Memory: Cognition and memory normal.         Judgment: Judgment normal.       Assessment/Plan   Diagnoses and all orders for this visit:    1. DDD (degenerative disc disease), cervical (Primary)  -     ketorolac (TORADOL) 10 MG tablet; Take 1 tablet by mouth Every 6 (Six) Hours As Needed for Moderate Pain . Limited to 5 days at a time  Dispense: 20 tablet; Refill: 1  -     Ambulatory Referral to Neurosurgery    2. Cervical spinal stenosis  -     ketorolac (TORADOL) 10 MG tablet; Take 1 tablet by mouth Every 6 (Six) " "Hours As Needed for Moderate Pain . Limited to 5 days at a time  Dispense: 20 tablet; Refill: 1  -     Ambulatory Referral to Neurosurgery    3. B12 deficiency  -     Syringe 25G X 1-1/2\" 3 ML misc; Use as directed for B12 injections monthly  Dispense: 25 each; Refill: 0    4. Chronic post-COVID-19 syndrome    5. Tachycardia    As part of evaluation today I reviewed extensive records from [facility].  Long-term NSAID use, not particularly good option because of her Eliquis.  She did have some improvement in symptoms from this.  I am concerned most of her problems may be due to cervical degenerative disc disease with radiculopathy and cervical spinal stenosis and may be myelopathy.  Her symptoms worsened when she fully extended her neck.  She is globally weak.  She has had unexplained episodes of tachycardia.  She had an upcoming trip planned and I have advised her not to go.  We will do a short course of Toradol on an as-needed basis to provide her some pain control.  We will try to get a semiurgent consult with neurosurgery.  I know her cervical MRI did not show specific cord impingement, but I do think it will be valuable to have her evaluated.    She needs a refill on insulin syringes for B12 injections.  I have refilled those today as well.    Follow-up here in about 2 weeks to continue to follow along with her course.       Call with any problems or concerns before next visit  Return in about 2 weeks (around 11/26/2021), or if symptoms worsen or fail to improve.      Much of this report is an electronic transcription of spoken language to printed text using Dragon dictation software.  As such, the subtleties and finesse of spoken language may permit erroneous, or at times, nonsensical words or phrases to be inadvertently transcribed; thus changes may be made at a later date to rectify these errors.   "

## 2021-11-13 PROBLEM — E53.8 B12 DEFICIENCY: Status: ACTIVE | Noted: 2021-11-13

## 2021-11-15 NOTE — PROGRESS NOTES
Neurosurgical Consultation      Vibha Darling is a 51 y.o. female is being seen for consultation today at the request of Jazmyne Sanchez MD for DDD (degenerative disc disease), cervical and cervical spinal stenosis.         Chief Complaint   Patient presents with   • Neck Pain     neck pain with tightness that radiates into both arms into the fingers with numbness and tingling.        Previous treatment: tramadol, toradol    HPI: This is a 51-year-old woman who previously was without significant comorbidities however recently has had multiple health related events.  She was diagnosed with left posterior chest wall melanoma requiring resection and subsequent radiation.  She was diagnosed with Covid and has had a prolonged course related to this.  She had a pulmonary embolus and is now currently on Eliquis.    Upon evaluation today her major complaint is a sense of heaviness and pressure in her neck and shoulders bilaterally.  She also describes numbness and tingling in her arms and legs.  She does have dizziness with looking up.  She notices hypersensitivity in her body.  She does take Xanax for anxiety but also feels as though this provides some relief in her shoulders.  She underwent 2 rounds of physical therapy with some improvement.  She is also recently started Toradol and again received some relief with this medication.    Past Medical History:   Diagnosis Date   • Anxiety    • Asthma    • Dumping syndrome    • Hyperlipidemia    • Hypertension    • Malignant melanoma (HCC) 06/2020   • Overweight (BMI 25.0-29.9) 4/21/2020        Past Surgical History:   Procedure Laterality Date   • CHOLECYSTECTOMY     • COLONOSCOPY     • DILATATION AND CURETTAGE     • DILATATION AND CURETTAGE  2010   • ECTOPIC PREGNANCY     • ECTOPIC PREGNANCY SURGERY     • SKIN CANCER EXCISION  07/01/2020        Current Outpatient Medications on File Prior to Visit   Medication Sig Dispense Refill   • ALPRAZolam (XANAX) 0.25 MG tablet  "Take 0.25 mg by mouth As Needed.     • apixaban (ELIQUIS) 5 MG tablet tablet Take 1 tablet by mouth Every 12 (Twelve) Hours. 60 tablet 5   • cyanocobalamin 1000 MCG/ML injection      • ketorolac (TORADOL) 10 MG tablet Take 1 tablet by mouth Every 6 (Six) Hours As Needed for Moderate Pain . Limited to 5 days at a time 20 tablet 1   • metoprolol tartrate (LOPRESSOR) 25 MG tablet 1/2 tablet po daily 90 tablet 1   • ondansetron (ZOFRAN) 8 MG tablet Take 8 mg by mouth.     • pantoprazole (PROTONIX) 40 mg in 100mL NS IVPB      • pregabalin (LYRICA) 50 MG capsule      • Prevalite 4 GM/DOSE powder DISSOLVE 2 SCOOPS IN 8OZ OF FLUID EVERY  g 2   • sertraline (ZOLOFT) 50 MG tablet      • Syringe 25G X 1-1/2\" 3 ML misc Use as directed for B12 injections monthly 25 each 0   • [DISCONTINUED] traMADol (ULTRAM) 50 MG tablet Take 50 mg by mouth Every 6 (Six) Hours As Needed for Moderate Pain .       No current facility-administered medications on file prior to visit.        Allergies   Allergen Reactions   • Doxycycline Other (See Comments) and Rash     Flushing     • Dye Fdc Red [Red Dye] Anaphylaxis   • Latex Itching and Rash     Skin peeling   • Levofloxacin Other (See Comments) and Rash     Flushing     • Lisinopril Cough   • Ciprofloxacin Other (See Comments)     Flushing    • Contrast Dye Itching   • Iodinated Diagnostic Agents Itching   • Antihistamines, Diphenhydramine-Type Palpitations   • Penicillins Rash     Patient states she can take penicillin-        Social History     Socioeconomic History   • Marital status:    Tobacco Use   • Smoking status: Never Smoker   • Smokeless tobacco: Never Used   Vaping Use   • Vaping Use: Never used   Substance and Sexual Activity   • Alcohol use: No   • Drug use: Never   • Sexual activity: Defer          Review of Systems   Constitutional: Positive for activity change.   HENT: Negative.    Eyes: Positive for blurred vision.   Respiratory: Negative.  Negative for chest " "tightness and shortness of breath.    Cardiovascular: Negative.  Negative for chest pain.   Gastrointestinal: Negative.    Endocrine: Negative.    Genitourinary: Negative.    Musculoskeletal: Positive for gait problem, myalgias, neck pain and neck stiffness.   Skin: Negative.    Allergic/Immunologic: Negative.    Neurological: Positive for dizziness, weakness (all extremities), light-headedness, numbness ( with tingling, bilateral arms) and headache.   Hematological: Negative.    Psychiatric/Behavioral: Negative.         Physical Examination:     Vitals:    11/16/21 1023   BP: 148/98   Pulse: 86   Resp: 16   Temp: 97.8 °F (36.6 °C)   SpO2: 97%   Weight: 73.5 kg (162 lb)   Height: 170.2 cm (67\")        Physical Exam  Eyes:      General: Lids are normal.      Extraocular Movements: Extraocular movements intact.      Pupils: Pupils are equal, round, and reactive to light.   Neurological:      Deep Tendon Reflexes: Strength normal.      Reflex Scores:       Patellar reflexes are 2+ on the right side and 2+ on the left side.       Achilles reflexes are 2+ on the right side and 2+ on the left side.  Psychiatric:         Speech: Speech normal.          Neurological Exam  Mental Status  Awake, alert and oriented to person, place and time. Speech is normal. Language is fluent with no aphasia. Attention and concentration are normal.  Patient easily comes to tears over the description of her symptomatology..    Cranial Nerves  CN II: Visual acuity is normal. Visual fields full to confrontation.  CN III, IV, VI: Extraocular movements intact bilaterally. Normal lids and orbits bilaterally. Pupils equal round and reactive to light bilaterally.  CN V: Facial sensation is normal.  CN VII: Full and symmetric facial movement.  CN IX, X: Palate elevates symmetrically. Normal gag reflex.  CN XI: Shoulder shrug strength is normal.  CN XII: Tongue midline without atrophy or fasciculations.  Patient refuses to look upward secondary to the " dizziness this induces..    Motor  Normal muscle bulk throughout. No fasciculations present. Normal muscle tone. Strength is 5/5 throughout all four extremities.  Multiple of her muscle groups appear to be deconditioned as they give way with extended contraction.    Reflexes                                           Right                      Left  Patellar                                2+                         2+  Achilles                                2+                         2+    Right pathological reflexes: Gill's absent. Ankle clonus absent.  Left pathological reflexes: Gill's present. Ankle clonus absent.  Left-sided Gill's is intermittent and subtle..    Coordination  Right: Finger-to-nose normal.  Left: Finger-to-nose normal.    Gait  Casual gait is normal including stance, stride, and arm swing.       Result Review  The following data was reviewed by: Ramesh Dickinson MD on 11/16/2021:    Data reviewed: Radiologic studies MRI of the cervical spine without contrast from November 9 of 2021 was personally reviewed and reveals multilevel moderate central canal stenosis from C3-C6.  She also does have some foraminal stenosis at these levels.     Assessment:  This is a 51-year-old woman with a difficult health-related year to year and a half.  She has been diagnosed with melanoma requiring surgery and radiation.  She has had a pulmonary embolus as well as Covid 19.  She has multiple diverse symptoms and it is difficult to parse out a single underlying cause.  I do believe some of her symptoms are secondary to deconditioning from Covid as well as other health issues.  However her symptoms and physical exam could also be related to cervical myelopathy.  Her MRI does indicate central canal stenosis with a extent that could cause myelopathy.  I have recommended that she continue with the Toradol medication as well as considering adding a muscle relaxer from her primary care physician.  I have  referred her to pain management for possible epidural steroid injection of the cervical spine.  If this provides significant relief of her symptoms this would indicate that the cervical spine is a major source of her symptomatology.  I also recommend that she continue to work with a psychologist in order to address her health related mental anguish.  She will follow up with me in 6 to 8 weeks for further evaluation.        Diagnoses and all orders for this visit:    1. Spinal stenosis in cervical region (Primary)  -     Ambulatory Referral to Pain Management         Return in about 8 weeks (around 1/11/2022).            Ramesh Dickinson MD

## 2021-11-16 ENCOUNTER — OFFICE VISIT (OUTPATIENT)
Dept: NEUROSURGERY | Facility: CLINIC | Age: 51
End: 2021-11-16

## 2021-11-16 VITALS
HEIGHT: 67 IN | TEMPERATURE: 97.8 F | RESPIRATION RATE: 16 BRPM | WEIGHT: 162 LBS | SYSTOLIC BLOOD PRESSURE: 148 MMHG | BODY MASS INDEX: 25.43 KG/M2 | DIASTOLIC BLOOD PRESSURE: 98 MMHG | HEART RATE: 86 BPM | OXYGEN SATURATION: 97 %

## 2021-11-16 DIAGNOSIS — M48.02 SPINAL STENOSIS IN CERVICAL REGION: Primary | ICD-10-CM

## 2021-11-16 PROCEDURE — 99204 OFFICE O/P NEW MOD 45 MIN: CPT | Performed by: NEUROLOGICAL SURGERY

## 2021-11-16 RX ORDER — SULFAMETHOXAZOLE AND TRIMETHOPRIM 800; 160 MG/1; MG/1
1 TABLET ORAL 2 TIMES DAILY
Qty: 14 TABLET | Refills: 0 | Status: SHIPPED | OUTPATIENT
Start: 2021-11-16 | End: 2021-11-23

## 2021-11-16 RX ORDER — PREGABALIN 50 MG/1
CAPSULE ORAL
COMMUNITY
Start: 2021-11-15 | End: 2022-01-26

## 2021-11-22 RX ORDER — CHOLESTYRAMINE 4 G/5.5G
POWDER, FOR SUSPENSION ORAL
Qty: 462 G | Refills: 2 | Status: SHIPPED | OUTPATIENT
Start: 2021-11-22 | End: 2022-08-05 | Stop reason: SDUPTHER

## 2021-11-23 ENCOUNTER — OFFICE VISIT (OUTPATIENT)
Dept: PAIN MEDICINE | Facility: CLINIC | Age: 51
End: 2021-11-23

## 2021-11-23 VITALS
BODY MASS INDEX: 25.43 KG/M2 | HEART RATE: 86 BPM | SYSTOLIC BLOOD PRESSURE: 158 MMHG | DIASTOLIC BLOOD PRESSURE: 98 MMHG | WEIGHT: 162 LBS | HEIGHT: 67 IN | RESPIRATION RATE: 16 BRPM

## 2021-11-23 DIAGNOSIS — M47.812 CERVICAL SPONDYLOSIS: ICD-10-CM

## 2021-11-23 DIAGNOSIS — U09.9 POST-COVID SYNDROME: ICD-10-CM

## 2021-11-23 DIAGNOSIS — M50.30 DDD (DEGENERATIVE DISC DISEASE), CERVICAL: Primary | ICD-10-CM

## 2021-11-23 PROCEDURE — 99204 OFFICE O/P NEW MOD 45 MIN: CPT | Performed by: STUDENT IN AN ORGANIZED HEALTH CARE EDUCATION/TRAINING PROGRAM

## 2021-11-23 RX ORDER — METHOCARBAMOL 500 MG/1
500 TABLET, FILM COATED ORAL NIGHTLY PRN
Qty: 30 TABLET | Refills: 0 | Status: SHIPPED | OUTPATIENT
Start: 2021-11-23 | End: 2021-12-23

## 2021-11-23 NOTE — PROGRESS NOTES
CHIEF COMPLAINT  Neck and arm pain.       Subjective   Vibha Darling is a 51 y.o. female who was referred by Dr. Ramesh Dickinson MD to our pain management clinic for consultation, evaluation and treatment of neck and radicular arm pain and BAUDILIO. All of her pain started in September 2020 after having Covid infection.  She was diagnosed with PE at the time and was placed on Eliquis and has been on Eliquis since then.    Neck pain is 8/10 on VAS, at maximum is 10/10. Pain is spasms, throbbing in b/l trapezius and shoulders. Numbness and tingling b/l hands. Sometimes she feels numbness and tingling in entire body. Pain is referred b/l arms. The pain is constant. The pain is improved by toradol and PT. The pain is worse with any activities.  Dizziness looking up.  Hypersensitivity for her body.  She had PT twice with mild improvement.  Currently on Toradol which provides some relief.  Complains of pressure in both her shoulders and sometimes difficulty breathing.  Also has some radicular pain going to right chest.  Symptoms are affecting her life.  Unable to drive since Covid due to numbness and tingling in both arms.      PMH:   Hx of left posterior chest wall melanoma require resection and radiation,Anxiety, asthma, dumping syndrome, hypertension, malignant melanoma, history of PE    Current Medications:   Toradol 10 mg every 6 hours as needed  Eliquis 5 mg  Xanax 0.25 mg as needed  Zoloft  Robaxin 500 mg qhs PRN      Past Medications:    Past Modalities:  TENS:       yes-hypersensitive due to pain.          Physical Therapy Within The Last 6 Months     yes  Psychotherapy     no  Massage Therapy      no    Patient Complains Of:  Uro-Fecal Incontinence no  Weight Gain/Loss  no  Fever/Chills   no  Weakness   yes-subjective      PEG Assessment   What number best describes your pain on average in the past week?10  What number best describes how, during the past week, pain has interfered with your enjoyment of life?10  What  "number best describes how, during the past week, pain has interfered with your general activity?  10        Current Outpatient Medications:   •  ALPRAZolam (XANAX) 0.25 MG tablet, Take 0.25 mg by mouth As Needed., Disp: , Rfl:   •  apixaban (ELIQUIS) 5 MG tablet tablet, Take 1 tablet by mouth Every 12 (Twelve) Hours., Disp: 60 tablet, Rfl: 5  •  cyanocobalamin 1000 MCG/ML injection, , Disp: , Rfl:   •  ketorolac (TORADOL) 10 MG tablet, Take 1 tablet by mouth Every 6 (Six) Hours As Needed for Moderate Pain . Limited to 5 days at a time, Disp: 20 tablet, Rfl: 1  •  metoprolol tartrate (LOPRESSOR) 25 MG tablet, 1/2 tablet po daily, Disp: 90 tablet, Rfl: 1  •  ondansetron (ZOFRAN) 8 MG tablet, Take 8 mg by mouth., Disp: , Rfl:   •  pantoprazole (PROTONIX) 40 mg in 100mL NS IVPB, , Disp: , Rfl:   •  Prevalite 4 GM/DOSE powder, Dissolve 4 g in 8 ounces of fluid and drink daily, Disp: 462 g, Rfl: 2  •  sertraline (ZOLOFT) 50 MG tablet, , Disp: , Rfl:   •  sulfamethoxazole-trimethoprim (Bactrim DS) 800-160 MG per tablet, Take 1 tablet by mouth 2 (Two) Times a Day for 7 days., Disp: 14 tablet, Rfl: 0  •  Syringe 25G X 1-1/2\" 3 ML misc, Use as directed for B12 injections monthly, Disp: 25 each, Rfl: 0  •  methocarbamol (ROBAXIN) 500 MG tablet, Take 1 tablet by mouth At Night As Needed for Muscle Spasms for up to 30 days., Disp: 30 tablet, Rfl: 0  •  pregabalin (LYRICA) 50 MG capsule, , Disp: , Rfl:     The following portions of the patient's history were reviewed and updated as appropriate: allergies, current medications, past family history, past medical history, past social history, past surgical history, and problem list.      REVIEW OF PERTINENT MEDICAL DATA    Past Medical History:   Diagnosis Date   • Anxiety    • Asthma    • Dumping syndrome    • Hyperlipidemia    • Hypertension    • Malignant melanoma (HCC) 06/2020   • Overweight (BMI 25.0-29.9) 4/21/2020     Past Surgical History:   Procedure Laterality Date   • " "CHOLECYSTECTOMY     • COLONOSCOPY     • DILATATION AND CURETTAGE     • DILATATION AND CURETTAGE  2010   • ECTOPIC PREGNANCY     • ECTOPIC PREGNANCY SURGERY     • SKIN CANCER EXCISION  07/01/2020     Family History   Problem Relation Age of Onset   • Heart disease Father    • Supraventricular tachycardia Father    • Heart disease Paternal Aunt    • Heart disease Paternal Uncle    • Thrombophilia Paternal Uncle    • Heart disease Paternal Uncle    • Alcohol abuse Paternal Uncle    • Hypertension Sister    • Hypertension Brother    • Hypertension Sister    • Hypertension Sister      Social History     Socioeconomic History   • Marital status:    Tobacco Use   • Smoking status: Never Smoker   • Smokeless tobacco: Never Used   Vaping Use   • Vaping Use: Never used   Substance and Sexual Activity   • Alcohol use: No   • Drug use: Never   • Sexual activity: Defer         Review of Systems   Musculoskeletal: Positive for gait problem, myalgias and neck pain.   Neurological: Positive for dizziness and weakness.   Psychiatric/Behavioral: Positive for sleep disturbance.         Vitals:    11/23/21 1057   BP: 158/98   Pulse: 86   Resp: 16   Weight: 73.5 kg (162 lb)   Height: 170.2 cm (67\")   PainSc:   8         Objective   Physical Exam  HENT:      Head:     Musculoskeletal:         General: Tenderness present.        Back:    Neurological:      Deep Tendon Reflexes:      Reflex Scores:       Tricep reflexes are 2+ on the right side and 2+ on the left side.       Bicep reflexes are 2+ on the right side and 2+ on the left side.       Brachioradialis reflexes are 2+ on the right side and 2+ on the left side.     Comments: Motor strength 5/5 b/l UE  Sensory intact b/l UE  Negative Coleman's bilaterally.             Imaging Reviewed:  MRI cervical spine without contrast-11/9/2021  -Multilevel spinal stenosis.  -AP diameter thecal sac midline at C3-4 is 8 mm, C4-5 is 8.3 mm, C5-6 -7.5 mm and C6-7 is 8 mm.  Abutment of the " cord at multiple levels.  No significant mass-effect on the cord.    C3-4--moderate disc osteophyte paracentral to the left.  Moderate to severe left neural foraminal compromise and moderate right neuroforaminal compromise.  Moderate anterior thecal sac compression.  C4-5-moderate to severe disc osteophyte complex.  Moderate to severe left and severe right neuroforaminal compromise.  Moderate anterior thecal sac compression.    C5-C6-severe disc osteophyte complex most prominent paracentral to the left.  Severe left and slightly lesser extent right neural foramen compromise with moderate to severe anterior thecal sac compression.  C6-7-moderate to severe disc osteophyte complex most prominent for paracentral to the right.  Severe right and lesser extent left neural foramen compromise.  Moderate anterior thecal sac compression.  C7-T1-mild to moderate disc osteophyte complex most prominent paracentral to the left.  Moderate left and mild right neuroforaminal compromise.    LE EMG- 2020    1.  The sural sensory study was normal bilaterally.     2.  The left tibial and left peroneal motor nerve conduction studies were normal.     3.  EMG of the muscles examined in the left lower extremity showed normal insertional activity and no spontaneous potentials.  There were a few chronic neurogenic changes in the L5 myotome.     Impression:  This is an essentially normal study of the left lower extremity.  There are a few chronic neurogenic changes in the L5 myotome but no acute neurogenic change in any muscle tested in the L3-S1 myotome.  There is no evidence of sensorimotor neuropathy or left peroneal focal neuropathy.        Assessment:    1. DDD (degenerative disc disease), cervical    2. Post-COVID syndrome    3. Cervical spondylosis         Plan:   1. We discussed trying a course of formal physical therapy.  Physical therapy can help strengthen and stretch the muscles around the joints. Continue to be as active as  possible.  She is in physical therapy twice with only mild improvement.  2.  Discussed with patient that her pain is most likely multifactorial and some of her symptoms could be originating from post Covid syndrome.  Cervical MRI also shows neuroforaminal narrowing and canal stenosis at multiple levels.  Unable to pinpoint specific level based on symptomology.  Discussed with patient that she will benefit from diagnostic and therapeutic BAUDILIO C7-T1 with gadolinium (allergic contrast dye but had gadolinium for MRI in past without any side effects). Patient has numbness and tingling in bilateral arms, patient has failed conservative therapy including PT and pharmacological management for more than 6 weeks and pain . Discussed the possibility of infection, bleeding, nerve damage, post dural puncture headache, increased pain, paraplegia. Patient understands and agrees.  Needs to be off of Eliquis for 3 days.  Patient is NP and she has spoken to her PCP regarding coming off of Eliquis and will obtain approval from her before proceeding with the procedure.    3.  We will start Robaxin 500 mg nightly as needed as above for pain in trapezius or rhomboids is originating from myofascial pain.  4.  Patient will benefit from neuropathic agent addition in the future.  We will consider adding Lyrica or low-dose amitriptyline.      RTC for injection and then 3 week follow up.     Ole Barragan DO  Pain Management   [facility]         INSPECT REPORT    As part of the patient's treatment plan, I may be prescribing controlled substances. The patient has been made aware of appropriate use of such medications, including potential risk of somnolence, limited ability to drive and/or work safely, and the potential for dependence or overdose. It has also been made clear that these medications are for use by this patient only, without concomitant use of alcohol or other substances unless prescribed.     Patient has completed prescribing  agreement detailing terms of continued prescribing of controlled substances, including monitoring INSPECT reports, urine drug screening, and pill counts if necessary. The patient is aware that inappropriate use will results in cessation of prescribing such medications.    INSPECT report has been reviewed and scanned into the patient's chart.      EMR Dragon/Transcription Disclaimer:   Much of this encounter note is an electronic transcription/translation of spoken language to printed text. The electronic translation of spoken language may permit erroneous, or at times, nonsensical words or phrases to be inadvertently transcribed; Although I have reviewed the note for such errors, some may still exist.

## 2021-11-30 ENCOUNTER — TELEPHONE (OUTPATIENT)
Dept: PAIN MEDICINE | Facility: CLINIC | Age: 51
End: 2021-11-30

## 2021-11-30 ENCOUNTER — HOSPITAL ENCOUNTER (OUTPATIENT)
Dept: PAIN MEDICINE | Facility: HOSPITAL | Age: 51
Discharge: HOME | End: 2021-11-30

## 2021-11-30 VITALS
BODY MASS INDEX: 25.43 KG/M2 | WEIGHT: 162 LBS | HEART RATE: 97 BPM | RESPIRATION RATE: 16 BRPM | OXYGEN SATURATION: 96 % | TEMPERATURE: 97.8 F | HEIGHT: 67 IN | DIASTOLIC BLOOD PRESSURE: 84 MMHG | SYSTOLIC BLOOD PRESSURE: 146 MMHG

## 2021-11-30 DIAGNOSIS — M50.30 DDD (DEGENERATIVE DISC DISEASE), CERVICAL: Primary | ICD-10-CM

## 2021-11-30 DIAGNOSIS — R52 PAIN: ICD-10-CM

## 2021-11-30 PROCEDURE — 62321 NJX INTERLAMINAR CRV/THRC: CPT | Performed by: STUDENT IN AN ORGANIZED HEALTH CARE EDUCATION/TRAINING PROGRAM

## 2021-11-30 PROCEDURE — 25010000002 GADOTERIDOL PER 1 ML

## 2021-11-30 PROCEDURE — 77003 FLUOROGUIDE FOR SPINE INJECT: CPT

## 2021-11-30 PROCEDURE — A9579 GAD-BASE MR CONTRAST NOS,1ML: HCPCS

## 2021-11-30 PROCEDURE — 25010000002 DEXAMETHASONE SODIUM PHOSPHATE 10 MG/ML SOLUTION: Performed by: STUDENT IN AN ORGANIZED HEALTH CARE EDUCATION/TRAINING PROGRAM

## 2021-11-30 RX ORDER — DEXAMETHASONE SODIUM PHOSPHATE 10 MG/ML
10 INJECTION, SOLUTION INTRAMUSCULAR; INTRAVENOUS ONCE
Status: COMPLETED | OUTPATIENT
Start: 2021-11-30 | End: 2021-11-30

## 2021-11-30 RX ADMIN — DEXAMETHASONE SODIUM PHOSPHATE 10 MG: 10 INJECTION, SOLUTION INTRAMUSCULAR; INTRAVENOUS at 14:45

## 2021-11-30 RX ADMIN — GADOTERIDOL 1 ML: 279.3 INJECTION, SOLUTION INTRAVENOUS at 14:45

## 2021-11-30 NOTE — PROCEDURES
PREOPERATIVE DIAGNOSIS:    1. Cervical DDD    POSTOPERATIVE DIAGNOSIS:  Same.    PROCEDURE PERFORMED: Cervical SARINA C 7-T1     PROCEDURE IN DETAIL:    Written informed consent was taken from the patient. Pre-procedure blood pressure and pulse were stable and recorded in patients clinic chart. The patient was brought to the procedure room and placed in the prone position on fluoroscopy table. The patient’s neck was prepped with chloraprep and draped in the usual sterile fashion.   The skin over the C 7-T1 space was identified under fluoroscopic guidance and infiltrated with 1% lidocaine for local anesthesia via 25 gauge needle.  A 20 gauge Tuohy needle was inserted through the skin under fluoroscopic guidance until we got to the epidural space with loss of resistance technique.  Negative for CSF and heme.  2 ml of gadolinium contrast was injected under continuous fluoroscopic guidance and good spread was noted from C5-7 space bilaterally. 10 mg of dexamethasone mixed with 4 ml of preservative free normal saline was injected with minimal pressure. The needle was cleared with preservative free normal saline and removed. Band aid was applied.  Following the procedure the patient's vital signs were stable. The patient was discharged home in good condition after being given discharge instructions.    COMPLICATIONS: None    Ole Barragan DO  Pain Management   [facility]

## 2021-11-30 NOTE — DISCHARGE INSTRUCTIONS
EPIDURAL STEROID INJECTION          An epidural steroid injection is a shot of steroid medicine and numbing medicine that is given into the space between the spinal cord and the bones of the back (epidural space).  The injection helps relieve pain by an irritated or swollen nerve root.    TELL YOUR HEALTH CARE PROVIDER ABOUT:  • Any allergies you have  • All medicines you are taking including any over the counter medicines  • Any blood disorders you have  • Any surgeries you have had  • Any medical conditions you have  • Whether you are pregnant or may be pregnant    WHAT ARE THE RISK?  Generally, this is a safe procedure. However,problems may occur, including  • Headache  • Bleeding  • Infection  • Allergic Reaction  • Nerve Damage    WHAT CAN I EXPECT AFTER THE PROCEDURE?    INJECTION SITE  • Remove the Band-Aid/s after 24 hours  • Check your injection site every day for signs of infection.  Check for:             Redness             Bleeding (small amt is normal)             Warmth             Pus or bad odor  • Some numbness may be experienced for several hours following the procedure.  • Avoid using heat on the injection site for 24 hours. You may use ice intermittently if needed by placing a         towel between your skin and the ice bag and using the ice for 20 minutes 2-3 times a day.  • Do not take baths, swim or use a hot tub for 24 hours.    ACTIVITY  • No strenuous activity for 24 hours then return to normal activity as tolerated.  • If your leg is numb, no driving until full sensation and strength has returned.    GENERAL INSTRUCTIONS:  • The injection site may feel numb, use ice with caution if numbness is present and no heat for 24 hours or until numbness is gone.   • If you have numbness or weakness in your arm or leg, use those areas with caution until normal sensation returns.  • It is not uncommon to notice an increase in discomfort or a change in the location of discomfort for 3-4 days after  the procedure.  If discomfort is noticed at the injection site, ice may be            applied to that area for 20 min 2-3 times a day.  • Take the pain medicine your physician has prescribed or over the counter pain relievers as long as you do not have any contraindications.  • If you are a diabetic, monitor your blood sugar closely.  The steroids used in your procedure may increase your blood sugar level up to 36 hours after the injection.  If your blood sugar is greater than 250, call the physician that helps you monitor your blood sugar.  • Keep all follow-up visits as scheduled by your health care provider. This is important.    CONTACT OUR OFFICE IF:  • You have any of these signs of infection            -Redness, swelling, or warmth around your injection site.            -Fluid or blood coming from your injection site (small amt of blood is normal)            -Pus or a bad odor from your injection site            -A fever  • You develop a severe headache or a stiff neck  • You lose control of your bladder or bowel movements      PAIN MANAGEMENT CENTER HOURS   • Monday-Friday 7:30 am. - 4:00 pm.  For any problem related to your procedure we can be reached at [phone]  • If you experience an emergency with your procedure, call [phone] or go to the emergency room.

## 2021-11-30 NOTE — TELEPHONE ENCOUNTER
Provider: LIT   Caller: DOUG  Phone Number: [phone]  Reason for Call: PT IS CALLING FOR MORE INFORMATION ON HER EPIDURAL THIS AFTERNOON SHE WAS UNSURE IF SHE HAD TO  FAST BEFORE THE PROCEDURE       PER HUB WORK FLOW ATTEMPTED TO WARM TRANSFER

## 2021-12-15 ENCOUNTER — TELEMEDICINE (OUTPATIENT)
Dept: FAMILY MEDICINE CLINIC | Facility: CLINIC | Age: 51
End: 2021-12-15

## 2021-12-15 VITALS — TEMPERATURE: 98 F | HEART RATE: 80 BPM | OXYGEN SATURATION: 99 %

## 2021-12-15 DIAGNOSIS — F32.9 REACTIVE DEPRESSION: ICD-10-CM

## 2021-12-15 DIAGNOSIS — M48.02 CERVICAL SPINAL STENOSIS: ICD-10-CM

## 2021-12-15 DIAGNOSIS — M50.30 DDD (DEGENERATIVE DISC DISEASE), CERVICAL: Primary | ICD-10-CM

## 2021-12-15 DIAGNOSIS — U09.9 CHRONIC POST-COVID-19 SYNDROME: ICD-10-CM

## 2021-12-15 DIAGNOSIS — M50.90 CERVICAL NECK PAIN WITH EVIDENCE OF DISC DISEASE: ICD-10-CM

## 2021-12-15 PROCEDURE — 99214 OFFICE O/P EST MOD 30 MIN: CPT | Performed by: FAMILY MEDICINE

## 2021-12-15 RX ORDER — DULOXETIN HYDROCHLORIDE 20 MG/1
20 CAPSULE, DELAYED RELEASE ORAL DAILY
Qty: 30 CAPSULE | Refills: 3 | Status: SHIPPED | OUTPATIENT
Start: 2021-12-15 | End: 2022-01-24

## 2021-12-30 ENCOUNTER — CLINICAL SUPPORT (OUTPATIENT)
Dept: FAMILY MEDICINE CLINIC | Facility: CLINIC | Age: 51
End: 2021-12-30

## 2021-12-30 DIAGNOSIS — U09.9 POST-COVID SYNDROME: ICD-10-CM

## 2021-12-30 DIAGNOSIS — M50.30 DDD (DEGENERATIVE DISC DISEASE), CERVICAL: Primary | ICD-10-CM

## 2021-12-30 DIAGNOSIS — M47.812 CERVICAL SPONDYLOSIS: ICD-10-CM

## 2021-12-30 DIAGNOSIS — Z23 FLU VACCINE NEED: ICD-10-CM

## 2021-12-30 RX ORDER — MELOXICAM 15 MG/1
15 TABLET ORAL DAILY PRN
Qty: 30 TABLET | Refills: 2 | Status: SHIPPED | OUTPATIENT
Start: 2021-12-30 | End: 2022-02-23

## 2022-01-04 ENCOUNTER — CLINICAL SUPPORT (OUTPATIENT)
Dept: FAMILY MEDICINE CLINIC | Facility: CLINIC | Age: 52
End: 2022-01-04

## 2022-01-04 DIAGNOSIS — J02.9 SORE THROAT: Primary | ICD-10-CM

## 2022-01-04 DIAGNOSIS — R53.83 FATIGUE, UNSPECIFIED TYPE: ICD-10-CM

## 2022-01-04 LAB
EXPIRATION DATE: NORMAL
EXPIRATION DATE: NORMAL
FLUAV AG NPH QL: NEGATIVE
FLUBV AG NPH QL: NEGATIVE
INTERNAL CONTROL: NORMAL
INTERNAL CONTROL: NORMAL
Lab: NORMAL
Lab: NORMAL
S PYO AG THROAT QL: NEGATIVE

## 2022-01-04 PROCEDURE — U0004 COV-19 TEST NON-CDC HGH THRU: HCPCS | Performed by: FAMILY MEDICINE

## 2022-01-04 PROCEDURE — 99211 OFF/OP EST MAY X REQ PHY/QHP: CPT | Performed by: FAMILY MEDICINE

## 2022-01-04 PROCEDURE — 87880 STREP A ASSAY W/OPTIC: CPT | Performed by: FAMILY MEDICINE

## 2022-01-04 PROCEDURE — 87804 INFLUENZA ASSAY W/OPTIC: CPT | Performed by: FAMILY MEDICINE

## 2022-01-05 LAB — SARS-COV-2 ORF1AB RESP QL NAA+PROBE: NOT DETECTED

## 2022-01-22 DIAGNOSIS — F32.9 REACTIVE DEPRESSION: ICD-10-CM

## 2022-01-22 DIAGNOSIS — M50.90 CERVICAL NECK PAIN WITH EVIDENCE OF DISC DISEASE: ICD-10-CM

## 2022-01-24 RX ORDER — DULOXETIN HYDROCHLORIDE 20 MG/1
CAPSULE, DELAYED RELEASE ORAL
Qty: 30 CAPSULE | Refills: 3 | Status: SHIPPED | OUTPATIENT
Start: 2022-01-24 | End: 2022-04-26

## 2022-01-26 ENCOUNTER — CLINICAL SUPPORT (OUTPATIENT)
Dept: FAMILY MEDICINE CLINIC | Facility: CLINIC | Age: 52
End: 2022-01-26

## 2022-01-26 ENCOUNTER — TELEMEDICINE (OUTPATIENT)
Dept: FAMILY MEDICINE CLINIC | Facility: TELEHEALTH | Age: 52
End: 2022-01-26

## 2022-01-26 DIAGNOSIS — R39.89 SUSPECTED UTI: ICD-10-CM

## 2022-01-26 DIAGNOSIS — Z20.822 EXPOSURE TO COVID-19 VIRUS: ICD-10-CM

## 2022-01-26 DIAGNOSIS — Z20.822 CLOSE EXPOSURE TO COVID-19 VIRUS: Primary | ICD-10-CM

## 2022-01-26 DIAGNOSIS — R52 BODY ACHES: Primary | ICD-10-CM

## 2022-01-26 LAB — SARS-COV-2 ORF1AB RESP QL NAA+PROBE: NOT DETECTED

## 2022-01-26 PROCEDURE — BHEMPVIDEOVISIT: Performed by: NURSE PRACTITIONER

## 2022-01-26 PROCEDURE — 99211 OFF/OP EST MAY X REQ PHY/QHP: CPT | Performed by: FAMILY MEDICINE

## 2022-01-26 PROCEDURE — U0004 COV-19 TEST NON-CDC HGH THRU: HCPCS | Performed by: FAMILY MEDICINE

## 2022-01-26 RX ORDER — SULFAMETHOXAZOLE AND TRIMETHOPRIM 800; 160 MG/1; MG/1
1 TABLET ORAL 2 TIMES DAILY
Qty: 10 TABLET | Refills: 0 | Status: SHIPPED | OUTPATIENT
Start: 2022-01-26 | End: 2022-01-31

## 2022-01-26 NOTE — PATIENT INSTRUCTIONS
"Due to your symptoms I have ordered a COVID-19 test for you to rule this out. Please go to your nearest [facility] for COVID-19 testing. When you arrive, let them know you have an order for testing. We will call you with the results from a BLOCKED NUMBER, usually within 1-3 days.     For [facility] Employee's undergoing COVID-19 testing: Have your COVID test done within 24-48 hours of failing the screening tool.     While you are waiting for your results, you should Self-Quarantine.     If your test is Negative: Complete the \"Return to Work Survey\" from [employer] (found on JANETH) to return to work.    If your test is Positive: Notify your Supervisor. Self-Quarantine until you are deemed no longer contagious. Complete the \"Return to Work Survey\" found on JANETH to report your Positive test and symptoms daily for further Guidance and return to work clearance.     You may contact [employer] if needed at [phone] or [phone]. Leave a VM with your full name, employee ID, , exact details of symptoms or exposure. They are currently experiencing high call volume which may lead to a delay in response time. Please refer to the online health screening tool for guidance.     Wipe front to back, increase water to flush the kidneys and avoid bladder irritants such as caffeine and alcohol.    -Warning signs: severe abdominal/pelvic/back pain, fever >101, blood in urine - seek medical attention as soon as possible for a hands on/objective exam and possible labs.     If symptoms worsen or do not improve follow up with your PCP or visit your nearest Urgent Care Center or ER.              Urinary Tract Infection, Adult  A urinary tract infection (UTI) is an infection of any part of the urinary tract. The urinary tract includes:  · The kidneys.  · The ureters.  · The bladder.  · The urethra.  These organs make, store, and get rid of pee (urine) in the body.  What are the causes?  This is caused by germs " (bacteria) in your genital area. These germs grow and cause swelling (inflammation) of your urinary tract.  What increases the risk?  You are more likely to develop this condition if:  · You have a small, thin tube (catheter) to drain pee.  · You cannot control when you pee or poop (incontinence).  · You are female, and:  ? You use these methods to prevent pregnancy:  § A medicine that kills sperm (spermicide).  § A device that blocks sperm (diaphragm).  ? You have low levels of a female hormone (estrogen).  ? You are pregnant.  · You have genes that add to your risk.  · You are sexually active.  · You take antibiotic medicines.  · You have trouble peeing because of:  ? A prostate that is bigger than normal, if you are male.  ? A blockage in the part of your body that drains pee from the bladder (urethra).  ? A kidney stone.  ? A nerve condition that affects your bladder (neurogenic bladder).  ? Not getting enough to drink.  ? Not peeing often enough.  · You have other conditions, such as:  ? Diabetes.  ? A weak disease-fighting system (immune system).  ? Sickle cell disease.  ? Gout.  ? Injury of the spine.  What are the signs or symptoms?  Symptoms of this condition include:  · Needing to pee right away (urgently).  · Peeing often.  · Peeing small amounts often.  · Pain or burning when peeing.  · Blood in the pee.  · Pee that smells bad or not like normal.  · Trouble peeing.  · Pee that is cloudy.  · Fluid coming from the vagina, if you are female.  · Pain in the belly or lower back.  Other symptoms include:  · Throwing up (vomiting).  · No urge to eat.  · Feeling mixed up (confused).  · Being tired and grouchy (irritable).  · A fever.  · Watery poop (diarrhea).  How is this treated?  This condition may be treated with:  · Antibiotic medicine.  · Other medicines.  · Drinking enough water.  Follow these instructions at home:    Medicines  · Take over-the-counter and prescription medicines only as told by your  doctor.  · If you were prescribed an antibiotic medicine, take it as told by your doctor. Do not stop taking it even if you start to feel better.  General instructions  · Make sure you:  ? Pee until your bladder is empty.  ? Do not hold pee for a long time.  ? Empty your bladder after sex.  ? Wipe from front to back after pooping if you are a female. Use each tissue one time when you wipe.  · Drink enough fluid to keep your pee pale yellow.  · Keep all follow-up visits as told by your doctor. This is important.  Contact a doctor if:  · You do not get better after 1-2 days.  · Your symptoms go away and then come back.  Get help right away if:  · You have very bad back pain.  · You have very bad pain in your lower belly.  · You have a fever.  · You are sick to your stomach (nauseous).  · You are throwing up.  Summary  · A urinary tract infection (UTI) is an infection of any part of the urinary tract.  · This condition is caused by germs in your genital area.  · There are many risk factors for a UTI. These include having a small, thin tube to drain pee and not being able to control when you pee or poop.  · Treatment includes antibiotic medicines for germs.  · Drink enough fluid to keep your pee pale yellow.  This information is not intended to replace advice given to you by your health care provider. Make sure you discuss any questions you have with your health care provider.  Document Revised: 12/05/2019 Document Reviewed: 06/27/2019  [employer] Patient Education © 2021 [employer]    Viral Respiratory Infection  A respiratory infection is an illness that affects part of the respiratory system, such as the lungs, nose, or throat. A respiratory infection that is caused by a virus is called a viral respiratory infection.  Common types of viral respiratory infections include:  · A cold.  · The flu (influenza).  · A respiratory syncytial virus (RSV) infection.  What are the causes?  This condition is caused by a  virus.  What are the signs or symptoms?  Symptoms of this condition include:  · A stuffy or runny nose.  · Yellow or green nasal discharge.  · A cough.  · Sneezing.  · Fatigue.  · Achy muscles.  · A sore throat.  · Sweating or chills.  · A fever.  · A headache.  How is this diagnosed?  This condition may be diagnosed based on:  · Your symptoms.  · A physical exam.  · Testing of nasal swabs.  How is this treated?  This condition may be treated with medicines, such as:  · Antiviral medicine. This may shorten the length of time a person has symptoms.  · Expectorants. These make it easier to cough up mucus.  · Decongestant nasal sprays.  · Acetaminophen or NSAIDs to relieve fever and pain.  Antibiotic medicines are not prescribed for viral infections. This is because antibiotics are designed to kill bacteria. They are not effective against viruses.  Follow these instructions at home:    Managing pain and congestion  · Take over-the-counter and prescription medicines only as told by your health care provider.  · If you have a sore throat, gargle with a salt-water mixture 3-4 times a day or as needed. To make a salt-water mixture, completely dissolve ½-1 tsp of salt in 1 cup of warm water.  · Use nose drops made from salt water to ease congestion and soften raw skin around your nose.  · Drink enough fluid to keep your urine pale yellow. This helps prevent dehydration and helps loosen up mucus.  General instructions  · Rest as much as possible.  · Do not drink alcohol.  · Do not use any products that contain nicotine or tobacco, such as cigarettes and e-cigarettes. If you need help quitting, ask your health care provider.  · Keep all follow-up visits as told by your health care provider. This is important.  How is this prevented?    · Get an annual flu shot. You may get the flu shot in late summer, fall, or winter. Ask your health care provider when you should get your flu shot.  · Avoid exposing others to your respiratory  infection.  ? Stay home from work or school as told by your health care provider.  ? Wash your hands with soap and water often, especially after you cough or sneeze. If soap and water are not available, use alcohol-based hand .  · Avoid contact with people who are sick during cold and flu season. This is generally fall and winter.  Contact a health care provider if:  · Your symptoms last for 10 days or longer.  · Your symptoms get worse over time.  · You have a fever.  · You have severe sinus pain in your face or forehead.  · The glands in your jaw or neck become very swollen.  Get help right away if you:  · Feel pain or pressure in your chest.  · Have shortness of breath.  · Faint or feel like you will faint.  · Have severe and persistent vomiting.  · Feel confused or disoriented.  Summary  · A respiratory infection is an illness that affects part of the respiratory system, such as the lungs, nose, or throat. A respiratory infection that is caused by a virus is called a viral respiratory infection.  · Common types of viral respiratory infections are a cold, influenza, and respiratory syncytial virus (RSV) infection.  · Symptoms of this condition include a stuffy or runny nose, cough, sneezing, fatigue, achy muscles, sore throat, and fevers or chills.  · Antibiotic medicines are not prescribed for viral infections. This is because antibiotics are designed to kill bacteria. They are not effective against viruses.  This information is not intended to replace advice given to you by your health care provider. Make sure you discuss any questions you have with your health care provider.  Document Revised: 12/26/2019 Document Reviewed: 01/28/2019  [employer] Patient Education © 2021 [employer]    10 Things You Can Do to Manage Your COVID-19 Symptoms at Home  If you have possible or confirmed COVID-19:  1. Stay home except to get medical care.  2. Monitor your symptoms carefully. If your symptoms get worse, call  your healthcare provider immediately.  3. Get rest and stay hydrated.  4. If you have a medical appointment, call the healthcare provider ahead of time and tell them that you have or may have COVID-19.  5. For medical emergencies, call 911 and notify the dispatch personnel that you have or may have COVID-19.  6. Cover your cough and sneezes with a tissue or use the inside of your elbow.  7. Wash your hands often with soap and water for at least 20 seconds or clean your hands with an alcohol-based hand  that contains at least 60% alcohol.  8. As much as possible, stay in a specific room and away from other people in your home. Also, you should use a separate bathroom, if available. If you need to be around other people in or outside of the home, wear a mask.  9. Avoid sharing personal items with other people in your household, like dishes, towels, and bedding.  10. Clean all surfaces that are touched often, like counters, tabletops, and doorknobs. Use household cleaning sprays or wipes according to the label instructions.  cdc.gov/coronavirus  07/16/2021  This information is not intended to replace advice given to you by your health care provider. Make sure you discuss any questions you have with your health care provider.  Document Revised: 08/04/2021 Document Reviewed: 08/04/2021  [employer] Patient Education © 2021 [employer]    How to Quarantine at Home  Information for Patients and Families    These instructions are for people with confirmed or suspected COVID-19 who do not need to be hospitalized and those with confirmed COVID-19 who were hospitalized and discharged to care for themselves at home.    If you were tested through the Health Department  The Health Department will monitor your wellbeing.  If it is determined that you do not need to be hospitalized and can be isolated at home, you will be monitored by staff from your local or state health department.     If you were tested through a  Commercial Lab  You will need to monitor yourself and report changes in your symptoms to your doctor.  See the section below called Monitor Your Symptoms.    Follow these steps until a healthcare provider or local or state health department says you can return to your normal activities.    Stay home except to get medical care  • Restrict activities outside your home, except for getting medical care.   • Do not go to work, school, or public areas.   • Avoid using public transportation, ride-sharing, or taxis.    Separate yourself from other people and animals in your home  People  As much as possible, you should stay in a specific room and away from other people in your home. Also, you should use a separate bathroom, if available.    Animals  You should restrict contact with pets and other animals while you are sick with COVID-19, just like you would around other people. When possible, have another member of your household care for your animals while you are sick. If you are sick with COVID-19, avoid contact with your pet, including petting, snuggling, being kissed or licked, and sharing food. If you must care for your pet or be around animals while you are sick, wash your hands before and after you interact with pets and wear a facemask. See COVID-19 and Animals for more information.    Call ahead before visiting your doctor  If you have a medical appointment, call the healthcare provider and tell them that you have or may have COVID-19. This information will help the healthcare provider’s office take steps to keep other people from getting infected or exposed.    Wear a facemask  You should wear a facemask when you are around other people (e.g., sharing a room or vehicle) or pets and before you enter a healthcare provider’s office.     If you are not able to wear a facemask (for example, because it causes trouble breathing), then people who live with you should not stay in the same room with you, or they should  wear a facemask if they enter your room.    Cover your coughs and sneezes  • Cover your mouth and nose with a tissue when you cough or sneeze.   • Throw used tissues in a lined trash can.   • Immediately wash your hands with soap and water for at least 20 seconds or, if soap and water are not available, clean your hands with an alcohol-based hand  that contains at least 60% alcohol.    Clean your hands often  • Wash your hands often with soap and water for at least 20 seconds, especially after blowing your nose, coughing, or sneezing; going to the bathroom; and before eating or preparing food.     • If soap and water are not readily available, use an alcohol-based hand  with at least 60% alcohol, covering all surfaces of your hands and rubbing them together until they feel dry.    • Soap and water are the best option if hands are visibly dirty. Avoid touching your eyes, nose, and mouth with unwashed hands.    Avoid sharing personal household items  • You should not share dishes, drinking glasses, cups, eating utensils, towels, or bedding with other people or pets in your home.   • After using these items, they should be washed thoroughly with soap and water.    Clean all “high-touch” surfaces everyday  • High touch surfaces include counters, tabletops, doorknobs, bathroom fixtures, toilets, phones, keyboards, tablets, and bedside tables.   • Also, clean any surfaces that may have blood, stool, or body fluids on them.   • Use a household cleaning spray or wipe, according to the label instructions. Labels contain instructions for safe and effective use of the cleaning product, including precautions you should take when applying the product, such as wearing gloves and making sure you have good ventilation during use of the product.    Monitor your symptoms  • Seek prompt medical attention if your illness is worsening (e.g., difficulty breathing).   • Before seeking care, call your healthcare provider  and tell them that you have, or are being evaluated for, COVID-19.   • Put on a facemask before you enter the facility.     • These steps will help the healthcare provider’s office to keep other people in the office or waiting room from getting infected or exposed.   • Persons who are placed under active monitoring or facilitated self-monitoring should follow instructions provided by their local health department or occupational health professionals, as appropriate.  • If you have a medical emergency and need to call 911, notify the dispatch personnel that you have, or are being evaluated for COVID-19. If possible, put on a facemask before emergency medical services arrive.    Discontinuing home isolation  Patients with confirmed COVID-19 should remain under home isolation precautions until the risk of secondary transmission to others is thought to be low. The decision to discontinue home isolation precautions should be made on a case-by-case basis, in consultation with healthcare providers and state and local health departments.    The below content are for household members, intimate partners, and caregivers of a patient with symptomatic laboratory-confirmed COVID-19 or a patient under investigation:    Household members, intimate partners, and caregivers may have close contact with a person with symptomatic, laboratory-confirmed COVID-19 or a person under investigation.     Close contacts should monitor their health; they should call their healthcare provider right away if they develop symptoms suggestive of COVID-19 (e.g., fever, cough, shortness of breath)     Close contacts should also follow these recommendations:  • Make sure that you understand and can help the patient follow their healthcare provider’s instructions for medication(s) and care. You should help the patient with basic needs in the home and provide support for getting groceries, prescriptions, and other personal needs.  • Monitor the patient’s  symptoms. If the patient is getting sicker, call his or her healthcare provider and tell them that the patient has laboratory-confirmed COVID-19. This will help the healthcare provider’s office take steps to keep other people in the office or waiting room from getting infected. Ask the healthcare provider to call the local or state health department for additional guidance. If the patient has a medical emergency and you need to call 911, notify the dispatch personnel that the patient has, or is being evaluated for COVID-19.  • Household members should stay in another room or be  from the patient as much as possible. Household members should use a separate bedroom and bathroom, if available.  • Prohibit visitors who do not have an essential need to be in the home.  • Household members should care for any pets in the home. Do not handle pets or other animals while sick.  For more information, see COVID-19 and Animals.  • Make sure that shared spaces in the home have good air flow, such as by an air conditioner or an opened window, weather permitting.  • Perform hand hygiene frequently. Wash your hands often with soap and water for at least 20 seconds or use an alcohol-based hand  that contains 60 to 95% alcohol, covering all surfaces of your hands and rubbing them together until they feel dry. Soap and water should be used preferentially if hands are visibly dirty.  • Avoid touching your eyes, nose, and mouth with unwashed hands.  • The patient should wear a facemask when you are around other people. If the patient is not able to wear a facemask (for example, because it causes trouble breathing), you, as the caregiver, should wear a mask when you are in the same room as the patient.  • Wear a disposable facemask and gloves when you touch or have contact with the patient’s blood, stool, or body fluids, such as saliva, sputum, nasal mucus, vomit, or urine.   o Throw out disposable facemasks and gloves  after using them. Do not reuse.  o When removing personal protective equipment, first remove and dispose of gloves. Then, immediately clean your hands with soap and water or alcohol-based hand . Next, remove and dispose of facemask, and immediately clean your hands again with soap and water or alcohol-based hand .  • Avoid sharing household items with the patient. You should not share dishes, drinking glasses, cups, eating utensils, towels, bedding, or other items. After the patient uses these items, you should wash them thoroughly (see below “Wash laundry thoroughly”).  • Clean all “high-touch” surfaces, such as counters, tabletops, doorknobs, bathroom fixtures, toilets, phones, keyboards, tablets, and bedside tables, every day. Also, clean any surfaces that may have blood, stool, or body fluids on them.   o Use a household cleaning spray or wipe, according to the label instructions. Labels contain instructions for safe and effective use of the cleaning product including precautions you should take when applying the product, such as wearing gloves and making sure you have good ventilation during use of the product.  • Wash laundry thoroughly.   o Immediately remove and wash clothes or bedding that have blood, stool, or body fluids on them.  o Wear disposable gloves while handling soiled items and keep soiled items away from your body. Clean your hands (with soap and water or an alcohol-based hand ) immediately after removing your gloves.  o Read and follow directions on labels of laundry or clothing items and detergent. In general, using a normal laundry detergent according to washing machine instructions and dry thoroughly using the warmest temperatures recommended on the clothing label.  • Place all used disposable gloves, facemasks, and other contaminated items in a lined container before disposing of them with other household waste. Clean your hands (with soap and water or an  alcohol-based hand ) immediately after handling these items. Soap and water should be used preferentially if hands are visibly dirty.  • Discuss any additional questions with your state or local health department or healthcare provider.    Adapted from information provided by the Centers for Disease Control and Prevention.  For more information, visit https://www.cdc.gov/coronavirus/2019-ncov/hcp/guidance-prevent-spread.html

## 2022-01-26 NOTE — PROGRESS NOTES
Subjective   Chief Complaint   Patient presents with   • Generalized Body Aches   • Exposure To Known Illness   • Urinary Tract Infection       Vibha Darling is a 51 y.o. female.     Pt is a  employee. She reports increased Body aches x today. Her daughter and grandchild have tested positive for COVID and she has been exposed. She tested negative on and at home test. She has been fully vax.     She is also experiencing UTI symptoms. She reports burning with urination, low back pain, bladder discomfort, bilateral flank pain, chills and nausea. She has had similar symptoms in the past with UTI's and is usually treated with Bactrim.     URI   This is a new problem. The current episode started today. The problem has been unchanged. There has been no fever. Associated symptoms include dysuria and nausea. Pertinent negatives include no abdominal pain or vomiting.   Urinary Tract Infection   This is a new problem. Episode onset: 1.5 weeks. The problem has been gradually worsening. The quality of the pain is described as burning. There has been no fever. There is no history of pyelonephritis. Associated symptoms include chills, flank pain and nausea. Pertinent negatives include no frequency, hematuria, hesitancy, urgency or vomiting. She has tried increased fluids (AZO) for the symptoms. The treatment provided mild relief. There is no history of kidney stones.        Allergies   Allergen Reactions   • Doxycycline Other (See Comments) and Rash     Flushing     • Dye Fdc Red [Red Dye] Anaphylaxis   • Latex Itching and Rash     Skin peeling   • Levofloxacin Other (See Comments) and Rash     Flushing     • Lisinopril Cough   • Ciprofloxacin Other (See Comments)     Flushing    • Contrast Dye Itching   • Iodinated Diagnostic Agents Itching   • Antihistamines, Diphenhydramine-Type Palpitations   • Penicillins Rash     Patient states she can take penicillin-       Past Medical History:   Diagnosis Date   • Anxiety    • Asthma     • Dumping syndrome    • Hyperlipidemia    • Hypertension    • Malignant melanoma (HCC) 06/2020   • Overweight (BMI 25.0-29.9) 4/21/2020       Past Surgical History:   Procedure Laterality Date   • CHOLECYSTECTOMY     • COLONOSCOPY     • DILATATION AND CURETTAGE     • DILATATION AND CURETTAGE  2010   • ECTOPIC PREGNANCY     • ECTOPIC PREGNANCY SURGERY     • SKIN CANCER EXCISION  07/01/2020       Social History     Socioeconomic History   • Marital status:    Tobacco Use   • Smoking status: Never Smoker   • Smokeless tobacco: Never Used   Vaping Use   • Vaping Use: Never used   Substance and Sexual Activity   • Alcohol use: No   • Drug use: Never   • Sexual activity: Defer       Family History   Problem Relation Age of Onset   • Heart disease Father    • Supraventricular tachycardia Father    • Heart disease Paternal Aunt    • Heart disease Paternal Uncle    • Thrombophilia Paternal Uncle    • Heart disease Paternal Uncle    • Alcohol abuse Paternal Uncle    • Hypertension Sister    • Hypertension Brother    • Hypertension Sister    • Hypertension Sister          Current Outpatient Medications:   •  ALPRAZolam (XANAX) 0.25 MG tablet, Take 0.25 mg by mouth As Needed., Disp: , Rfl:   •  cyanocobalamin 1000 MCG/ML injection, , Disp: , Rfl:   •  DULoxetine (CYMBALTA) 20 MG capsule, TAKE 1 CAPSULE BY MOUTH EVERY DAY, Disp: 30 capsule, Rfl: 3  •  meloxicam (MOBIC) 15 MG tablet, Take 1 tablet by mouth Daily As Needed for Moderate Pain  for up to 90 days., Disp: 30 tablet, Rfl: 2  •  metoprolol tartrate (LOPRESSOR) 25 MG tablet, 1/2 tablet po daily, Disp: 90 tablet, Rfl: 1  •  ondansetron (ZOFRAN) 8 MG tablet, Take 8 mg by mouth., Disp: , Rfl:   •  pantoprazole (PROTONIX) 40 mg in 100mL NS IVPB, , Disp: , Rfl:   •  Prevalite 4 GM/DOSE powder, Dissolve 4 g in 8 ounces of fluid and drink daily, Disp: 462 g, Rfl: 2  •  sulfamethoxazole-trimethoprim (Bactrim DS) 800-160 MG per tablet, Take 1 tablet by mouth 2 (Two)  Times a Day for 5 days., Disp: 10 tablet, Rfl: 0      Review of Systems   Constitutional: Positive for chills. Negative for diaphoresis, fatigue and fever.   HENT: Negative.    Respiratory: Negative.    Gastrointestinal: Positive for nausea. Negative for abdominal distention, abdominal pain and vomiting.   Genitourinary: Positive for dysuria, flank pain and pelvic pressure. Negative for frequency, hematuria, hesitancy and urgency.   Musculoskeletal: Positive for back pain (low back) and myalgias.   Neurological: Negative for headache.        There were no vitals filed for this visit.    Objective   Physical Exam  Constitutional:       Comments: Telephone visit   HENT:      Mouth/Throat:      Lips: Pink.   Pulmonary:      Effort: Pulmonary effort is normal.   Neurological:      Mental Status: She is alert and oriented to person, place, and time.          Procedures     Assessment/Plan   Diagnoses and all orders for this visit:    1. Body aches (Primary)  -     COVID-19,APTIMA PANTHER(JACQUELYN),[facility], NP/OP SWAB IN UTM/VTM/SALINE TRANSPORT MEDIA,24 HR TAT - Swab, Nasopharynx; Future    2. Exposure to COVID-19 virus  -     COVID-19,APTIMA PANTHER(JACQUELYN),[facility], NP/OP SWAB IN UTM/VTM/SALINE TRANSPORT MEDIA,24 HR TAT - Swab, Nasopharynx; Future    3. Suspected UTI  -     sulfamethoxazole-trimethoprim (Bactrim DS) 800-160 MG per tablet; Take 1 tablet by mouth 2 (Two) Times a Day for 5 days.  Dispense: 10 tablet; Refill: 0        Due to your symptoms I have ordered a COVID-19 test for you to rule this out. Please go to your nearest [facility] for COVID-19 testing. When you arrive, let them know you have an order for testing. We will call you with the results from a BLOCKED NUMBER, usually within 1-3 days.     For [facility] Employee's undergoing COVID-19 testing: Have your COVID test done within 24-48 hours of failing the screening tool.     While you are waiting for your results, you should  "Self-Quarantine.     If your test is Negative: Complete the \"Return to Work Survey\" from employee health (found on JANETH) to return to work.    If your test is Positive: Notify your Supervisor. Self-Quarantine until you are deemed no longer contagious. Complete the \"Return to Work Survey\" found on JANETH to report your Positive test and symptoms daily for further Guidance and return to work clearance.     You may contact [employer] [facility] if needed at [phone] or [phone]. Leave a VM with your full name, employee ID, , exact details of symptoms or exposure. They are currently experiencing high call volume which may lead to a delay in response time. Please refer to the online health screening tool for guidance.     Wipe front to back, increase water to flush the kidneys and avoid bladder irritants such as caffeine and alcohol.    -Warning signs: severe abdominal/pelvic/back pain, fever >101, blood in urine - seek medical attention as soon as possible for a hands on/objective exam and possible labs.     If symptoms worsen or do not improve follow up with your PCP or visit your nearest Urgent Care Center or ER.        PLAN: Discussed dosing, side effects, recommended other symptomatic care.  Patient should follow up with primary care provider if symptoms worsen, fail to resolve or other symptoms need attention. Patient/family agree to the above.       BLAIR Borrero     This visit was performed via Telehealth.  This patient has been instructed to follow-up with their primary care provider if their symptoms worsen or the treatment provided does not resolve their illness.         "

## 2022-02-02 DIAGNOSIS — Z20.822 CLOSE EXPOSURE TO COVID-19 VIRUS: Primary | ICD-10-CM

## 2022-02-03 PROCEDURE — U0004 COV-19 TEST NON-CDC HGH THRU: HCPCS | Performed by: FAMILY MEDICINE

## 2022-02-23 DIAGNOSIS — M50.30 DDD (DEGENERATIVE DISC DISEASE), CERVICAL: ICD-10-CM

## 2022-02-23 DIAGNOSIS — M47.812 CERVICAL SPONDYLOSIS: ICD-10-CM

## 2022-02-23 DIAGNOSIS — U09.9 POST-COVID SYNDROME: ICD-10-CM

## 2022-02-23 RX ORDER — METHYLPREDNISOLONE 4 MG/1
TABLET ORAL
Qty: 21 TABLET | Refills: 0 | Status: SHIPPED | OUTPATIENT
Start: 2022-02-23 | End: 2022-05-06

## 2022-02-23 RX ORDER — DICLOFENAC SODIUM 75 MG/1
75 TABLET, DELAYED RELEASE ORAL 2 TIMES DAILY PRN
Qty: 60 TABLET | Refills: 0 | Status: SHIPPED | OUTPATIENT
Start: 2022-02-23 | End: 2022-03-25

## 2022-02-25 ENCOUNTER — TELEMEDICINE (OUTPATIENT)
Dept: FAMILY MEDICINE CLINIC | Facility: CLINIC | Age: 52
End: 2022-02-25

## 2022-02-25 VITALS
WEIGHT: 170 LBS | BODY MASS INDEX: 26.68 KG/M2 | TEMPERATURE: 97.9 F | DIASTOLIC BLOOD PRESSURE: 84 MMHG | HEART RATE: 74 BPM | HEIGHT: 67 IN | SYSTOLIC BLOOD PRESSURE: 130 MMHG | OXYGEN SATURATION: 99 %

## 2022-02-25 DIAGNOSIS — M54.14 THORACIC RADICULOPATHY: ICD-10-CM

## 2022-02-25 DIAGNOSIS — U09.9 POST-COVID-19 SYNDROME: ICD-10-CM

## 2022-02-25 DIAGNOSIS — M50.90 CERVICAL NECK PAIN WITH EVIDENCE OF DISC DISEASE: ICD-10-CM

## 2022-02-25 DIAGNOSIS — R53.83 FATIGUE, UNSPECIFIED TYPE: ICD-10-CM

## 2022-02-25 DIAGNOSIS — E53.8 B12 DEFICIENCY: ICD-10-CM

## 2022-02-25 DIAGNOSIS — F32.9 REACTIVE DEPRESSION: Primary | ICD-10-CM

## 2022-02-25 PROCEDURE — 99214 OFFICE O/P EST MOD 30 MIN: CPT | Performed by: FAMILY MEDICINE

## 2022-02-25 RX ORDER — SERTRALINE HYDROCHLORIDE 25 MG/1
25 TABLET, FILM COATED ORAL DAILY
COMMUNITY
Start: 2022-01-22 | End: 2022-05-06

## 2022-02-25 NOTE — PROGRESS NOTES
"Subjective   Vibha Darling is a 51 y.o. female.   Chief Complaint   Patient presents with   • DDD   • Anxiety       History of Present Illness   Reevaluated today by video visit per patient choice.    You have chosen to receive care through a telehealth visit.  Do you consent to use a video/audio connection for your medical care today? Yes    My last visit with Louisa was about 2 months ago.  At that time we made a decision to titrate and discontinue Zoloft and to start Cymbalta.  Our hope was that that could serve double duty to help with both her reactive depression due to chronic illness as well as neck pain related to cervical spinal stenosis with radiculopathy.  She only started the Cymbalta 2 days ago.  She feels this to her with really no help.  She delayed restarting this because multiple family members got Covid recently including her daughter and granddaughter.  She spends a lot of time helping take care of them.  She herself did not become symptomatic again.    Overall, her general physical condition had stabilized and even improved a little bit.  She told me that she has been feeling really good lately.  Her energy was coming back.  She now is able to feel her upper shoulders again.  She is back to sleeping and Ativan again.  She is driving again.  She is going back to 8 hours at work again.  She in fact was feeling good enough that she was moving her head around to a song in the car and she thinks that may have triggered an increase in her neck and upper shoulder pain.  Her pain specialist, Dr. Barragan gave her a steroid pack and diclofenac yesterday.  Prior to that she was able to do most things that she wants to do.  She explains that even at her best if she walks very far she has pressure in her upper mid back and posterior neck.  She was not able to take meloxicam-it made her \"feel loopy\".  So far, she is doing okay with diclofenac so far.    She is due for her COVID booster vaccine and is very " concerned that she will have a relapse with the booster.  She would like to have Covid antibodies drawn.  She talked with her oncologist who is following her for melanoma.  He recommended she have vitamin levels drawn.  She continues with general fatigue.    Patient Active Problem List    Diagnosis Date Noted   • B12 deficiency 11/13/2021   • Post-COVID-19 syndrome manifesting as chronic headache 10/07/2021   • Fatigue 09/24/2021   • PND (post-nasal drip) 09/24/2021   • SOB (shortness of breath) 09/24/2021   • Post-acute COVID-19 syndrome 07/29/2021   • COVID-19 virus detected 06/21/2021   • Palpitations 06/07/2021   • DDD (degenerative disc disease), cervical 04/19/2021   • Anxiety 02/03/2021   • Multiple subsegmental pulmonary emboli without acute cor pulmonale (HCC) 02/02/2021     Note Last Updated: 2/8/2021     Bilateral - 2/2/21     • Malignant melanoma of torso excluding breast (HCC) 01/21/2021   • Thoracic radiculopathy 11/02/2020   • Inappropriate sinus tachycardia 10/26/2020   • Eosinophilic esophagitis 10/26/2020   • Lumbar degenerative disc disease 09/13/2020     Note Last Updated: 9/13/2020     MRI at [facility] - 7/27/20 - 1. No finding suspicious for metastatic disease  2. Lumbar spondylosis with degenerative disc disease and lateral recess stenosis at L5-S1.     • Melanoma of back (HCC) 07/23/2020   • Melanoma (HCC) 06/17/2020   • Abnormal electrocardiogram (ECG) (EKG) 04/22/2020   • Chest pain, atypical 04/21/2020   • Mixed hyperlipidemia 04/21/2020   • Elevated LFTs 04/21/2020   • Overweight (BMI 25.0-29.9) 04/21/2020   • Generalized anxiety disorder 04/21/2020   • Essential hypertension 05/01/2019           Past Surgical History:   Procedure Laterality Date   • CHOLECYSTECTOMY     • COLONOSCOPY     • DILATATION AND CURETTAGE     • DILATATION AND CURETTAGE  2010   • ECTOPIC PREGNANCY     • ECTOPIC PREGNANCY SURGERY     • SKIN CANCER EXCISION  07/01/2020     Current Outpatient Medications on File Prior to  Visit   Medication Sig   • ALPRAZolam (XANAX) 0.25 MG tablet Take 0.25 mg by mouth As Needed.   • cyanocobalamin 1000 MCG/ML injection    • diclofenac (VOLTAREN) 75 MG EC tablet Take 1 tablet by mouth 2 (Two) Times a Day As Needed (moderate pain) for up to 30 days.   • DULoxetine (CYMBALTA) 20 MG capsule TAKE 1 CAPSULE BY MOUTH EVERY DAY   • methylPREDNISolone (MEDROL) 4 MG dose pack Take as directed on package instructions.   • metoprolol tartrate (LOPRESSOR) 25 MG tablet 1/2 tablet po daily   • ondansetron (ZOFRAN) 8 MG tablet Take 8 mg by mouth.   • pantoprazole (PROTONIX) 40 mg in 100mL NS IVPB    • Prevalite 4 GM/DOSE powder Dissolve 4 g in 8 ounces of fluid and drink daily   • sertraline (ZOLOFT) 25 MG tablet Take 25 mg by mouth Daily.     No current facility-administered medications on file prior to visit.     Allergies   Allergen Reactions   • Doxycycline Other (See Comments) and Rash     Flushing     • Dye Fdc Red [Red Dye] Anaphylaxis   • Latex Itching and Rash     Skin peeling   • Levofloxacin Other (See Comments) and Rash     Flushing     • Lisinopril Cough   • Ciprofloxacin Other (See Comments)     Flushing    • Contrast Dye Itching   • Iodinated Diagnostic Agents Itching   • Antihistamines, Diphenhydramine-Type Palpitations   • Penicillins Rash     Patient states she can take penicillin-     Social History     Socioeconomic History   • Marital status:    Tobacco Use   • Smoking status: Never Smoker   • Smokeless tobacco: Never Used   Vaping Use   • Vaping Use: Never used   Substance and Sexual Activity   • Alcohol use: No   • Drug use: Never   • Sexual activity: Defer     Family History   Problem Relation Age of Onset   • Heart disease Father    • Supraventricular tachycardia Father    • Heart disease Paternal Aunt    • Heart disease Paternal Uncle    • Thrombophilia Paternal Uncle    • Heart disease Paternal Uncle    • Alcohol abuse Paternal Uncle    • Hypertension Sister    • Hypertension  "Brother    • Hypertension Sister    • Hypertension Sister        Review of Systems    Objective   /84 (BP Location: Left arm, Patient Position: Sitting, Cuff Size: Adult)   Pulse 74   Temp 97.9 °F (36.6 °C) (Oral)   Ht 170.2 cm (67.01\")   Wt 77.1 kg (170 lb)   LMP  (LMP Unknown)   SpO2 99%   BMI 26.62 kg/m²   Physical Exam  Constitutional:       General: She is not in acute distress.     Appearance: She is well-developed.   HENT:      Head: Normocephalic and atraumatic.   Pulmonary:      Effort: No accessory muscle usage or respiratory distress.   Neurological:      Mental Status: She is alert and oriented to person, place, and time.   Psychiatric:         Speech: Speech normal.         Assessment/Plan   Diagnoses and all orders for this visit:    1. Reactive depression (Primary)    2. Cervical neck pain with evidence of disc disease    3. Post-COVID-19 syndrome  -     SARS-CoV-2 Antibody, IgM; Future  -     SARS-CoV-2 Antibody, IgA; Future  -     SARS-CoV-2 Antibody, IgG ([place]); Future    4. B12 deficiency  -     Vitamin B12; Future    5. Thoracic radiculopathy  -     Vitamin B6; Future  -     Vitamin B1, Whole Blood; Future    6. Fatigue, unspecified type  -     CBC & Differential; Future  -     Basic Metabolic Panel; Future  -     TSH; Future  -     GARCIA; Future    Multiple ongoing issues as above.  Probably a little too early to know for sure whether Cymbalta will help with her mood and/for chronic pain.  I would like her to go ahead and take that as planned and follow-up here in 6 weeks to see if it is helping.  We will get labs as above to follow vitamin levels, get her Covid antibodies and do some labs as above due to her complaint of fatigue.  We will follow up with her when the results of the tests are back.  Reevaluate here again in 6 weeks.        Call with any problems or concerns before next visit       Return in about 6 weeks (around 4/8/2022).      Much of this report is an electronic " transcription of spoken language to printed text using Dragon dictation software.  As such, the subtleties and finesse of spoken language may permit erroneous, or at times, nonsensical words or phrases to be inadvertently transcribed; thus changes may be made at a later date to rectify these errors.     Jazmyne Sanchez MD2/25/202216:46 EST  This note has been electronically signed

## 2022-03-03 ENCOUNTER — CLINICAL SUPPORT (OUTPATIENT)
Dept: FAMILY MEDICINE CLINIC | Facility: CLINIC | Age: 52
End: 2022-03-03

## 2022-03-03 DIAGNOSIS — R94.31 ABNORMAL ELECTROCARDIOGRAM (ECG) (EKG): ICD-10-CM

## 2022-03-03 DIAGNOSIS — I10 ESSENTIAL HYPERTENSION: ICD-10-CM

## 2022-03-03 DIAGNOSIS — E78.2 MIXED HYPERLIPIDEMIA: Primary | ICD-10-CM

## 2022-03-03 DIAGNOSIS — R79.89 ELEVATED LFTS: ICD-10-CM

## 2022-03-03 LAB
ANION GAP SERPL CALCULATED.3IONS-SCNC: 11 MMOL/L (ref 5–15)
BASOPHILS # BLD AUTO: 0.05 10*3/MM3 (ref 0–0.2)
BASOPHILS NFR BLD AUTO: 0.8 % (ref 0–1.5)
BUN SERPL-MCNC: 16 MG/DL (ref 6–20)
BUN/CREAT SERPL: 20.3 (ref 7–25)
CALCIUM SPEC-SCNC: 10 MG/DL (ref 8.6–10.5)
CHLORIDE SERPL-SCNC: 102 MMOL/L (ref 98–107)
CO2 SERPL-SCNC: 28 MMOL/L (ref 22–29)
CREAT SERPL-MCNC: 0.79 MG/DL (ref 0.57–1)
DEPRECATED RDW RBC AUTO: 41.6 FL (ref 37–54)
EGFRCR SERPLBLD CKD-EPI 2021: 90.7 ML/MIN/1.73
EOSINOPHIL # BLD AUTO: 0.1 10*3/MM3 (ref 0–0.4)
EOSINOPHIL NFR BLD AUTO: 1.5 % (ref 0.3–6.2)
ERYTHROCYTE [DISTWIDTH] IN BLOOD BY AUTOMATED COUNT: 12.2 % (ref 12.3–15.4)
GLUCOSE SERPL-MCNC: 96 MG/DL (ref 65–99)
HCT VFR BLD AUTO: 44.6 % (ref 34–46.6)
HGB BLD-MCNC: 15 G/DL (ref 12–15.9)
IMM GRANULOCYTES # BLD AUTO: 0.04 10*3/MM3 (ref 0–0.05)
IMM GRANULOCYTES NFR BLD AUTO: 0.6 % (ref 0–0.5)
LYMPHOCYTES # BLD AUTO: 1.34 10*3/MM3 (ref 0.7–3.1)
LYMPHOCYTES NFR BLD AUTO: 20.7 % (ref 19.6–45.3)
MCH RBC QN AUTO: 31.4 PG (ref 26.6–33)
MCHC RBC AUTO-ENTMCNC: 33.6 G/DL (ref 31.5–35.7)
MCV RBC AUTO: 93.3 FL (ref 79–97)
MONOCYTES # BLD AUTO: 0.42 10*3/MM3 (ref 0.1–0.9)
MONOCYTES NFR BLD AUTO: 6.5 % (ref 5–12)
NEUTROPHILS NFR BLD AUTO: 4.52 10*3/MM3 (ref 1.7–7)
NEUTROPHILS NFR BLD AUTO: 69.9 % (ref 42.7–76)
NRBC BLD AUTO-RTO: 0.2 /100 WBC (ref 0–0.2)
PLATELET # BLD AUTO: 302 10*3/MM3 (ref 140–450)
PMV BLD AUTO: 9.9 FL (ref 6–12)
POTASSIUM SERPL-SCNC: 4.2 MMOL/L (ref 3.5–5.2)
RBC # BLD AUTO: 4.78 10*6/MM3 (ref 3.77–5.28)
SODIUM SERPL-SCNC: 141 MMOL/L (ref 136–145)
TSH SERPL DL<=0.05 MIU/L-ACNC: 1.34 UIU/ML (ref 0.27–4.2)
VIT B12 BLD-MCNC: 429 PG/ML (ref 211–946)
WBC NRBC COR # BLD: 6.47 10*3/MM3 (ref 3.4–10.8)

## 2022-03-03 PROCEDURE — 82607 VITAMIN B-12: CPT | Performed by: FAMILY MEDICINE

## 2022-03-03 PROCEDURE — 86769 SARS-COV-2 COVID-19 ANTIBODY: CPT | Performed by: FAMILY MEDICINE

## 2022-03-03 PROCEDURE — 84425 ASSAY OF VITAMIN B-1: CPT | Performed by: FAMILY MEDICINE

## 2022-03-03 PROCEDURE — 85025 COMPLETE CBC W/AUTO DIFF WBC: CPT | Performed by: FAMILY MEDICINE

## 2022-03-03 PROCEDURE — 86038 ANTINUCLEAR ANTIBODIES: CPT | Performed by: FAMILY MEDICINE

## 2022-03-03 PROCEDURE — 84443 ASSAY THYROID STIM HORMONE: CPT

## 2022-03-03 PROCEDURE — 84207 ASSAY OF VITAMIN B-6: CPT | Performed by: FAMILY MEDICINE

## 2022-03-03 PROCEDURE — 36415 COLL VENOUS BLD VENIPUNCTURE: CPT | Performed by: FAMILY MEDICINE

## 2022-03-03 PROCEDURE — 80048 BASIC METABOLIC PNL TOTAL CA: CPT

## 2022-03-03 PROCEDURE — 36415 COLL VENOUS BLD VENIPUNCTURE: CPT

## 2022-03-03 NOTE — PROGRESS NOTES
Venipuncture Blood Specimen Collection  Venipuncture performed in (R) arm via butterfly by Lisa Cole LPN with good hemostasis. Patient tolerated the procedure well without complications.   03/03/22   Lisa Cole LPN

## 2022-03-28 ENCOUNTER — CLINICAL SUPPORT (OUTPATIENT)
Dept: FAMILY MEDICINE CLINIC | Facility: CLINIC | Age: 52
End: 2022-03-28

## 2022-03-28 DIAGNOSIS — R30.0 DYSURIA: ICD-10-CM

## 2022-03-28 DIAGNOSIS — N30.00 ACUTE CYSTITIS WITHOUT HEMATURIA: Primary | ICD-10-CM

## 2022-03-28 DIAGNOSIS — R39.89 SUSPECTED UTI: Primary | ICD-10-CM

## 2022-03-28 LAB
BILIRUB BLD-MCNC: NEGATIVE MG/DL
CLARITY, POC: CLEAR
COLOR UR: YELLOW
EXPIRATION DATE: ABNORMAL
GLUCOSE UR STRIP-MCNC: NEGATIVE MG/DL
KETONES UR QL: NEGATIVE
LEUKOCYTE EST, POC: NEGATIVE
Lab: ABNORMAL
NITRITE UR-MCNC: POSITIVE MG/ML
PH UR: 6.5 [PH] (ref 5–8)
PROT UR STRIP-MCNC: NEGATIVE MG/DL
RBC # UR STRIP: ABNORMAL /UL
SP GR UR: 1.02 (ref 1–1.03)
UROBILINOGEN UR QL: NORMAL

## 2022-03-28 PROCEDURE — 87077 CULTURE AEROBIC IDENTIFY: CPT | Performed by: NURSE PRACTITIONER

## 2022-03-28 PROCEDURE — 87086 URINE CULTURE/COLONY COUNT: CPT | Performed by: NURSE PRACTITIONER

## 2022-03-28 PROCEDURE — 81003 URINALYSIS AUTO W/O SCOPE: CPT | Performed by: NURSE PRACTITIONER

## 2022-03-28 PROCEDURE — 87186 SC STD MICRODIL/AGAR DIL: CPT

## 2022-03-28 RX ORDER — SULFAMETHOXAZOLE AND TRIMETHOPRIM 800; 160 MG/1; MG/1
1 TABLET ORAL 2 TIMES DAILY
Qty: 10 TABLET | Refills: 0 | Status: SHIPPED | OUTPATIENT
Start: 2022-03-28 | End: 2022-05-06

## 2022-03-30 LAB — BACTERIA SPEC AEROBE CULT: ABNORMAL

## 2022-04-26 DIAGNOSIS — F32.9 REACTIVE DEPRESSION: ICD-10-CM

## 2022-04-26 DIAGNOSIS — M50.90 CERVICAL NECK PAIN WITH EVIDENCE OF DISC DISEASE: ICD-10-CM

## 2022-04-26 RX ORDER — DULOXETIN HYDROCHLORIDE 20 MG/1
CAPSULE, DELAYED RELEASE ORAL
Qty: 90 CAPSULE | Refills: 1 | Status: SHIPPED | OUTPATIENT
Start: 2022-04-26 | End: 2022-08-25 | Stop reason: SDUPTHER

## 2022-05-06 ENCOUNTER — TELEMEDICINE (OUTPATIENT)
Dept: FAMILY MEDICINE CLINIC | Facility: CLINIC | Age: 52
End: 2022-05-06

## 2022-05-06 VITALS
WEIGHT: 172 LBS | RESPIRATION RATE: 16 BRPM | HEART RATE: 87 BPM | HEIGHT: 67 IN | OXYGEN SATURATION: 96 % | BODY MASS INDEX: 27 KG/M2

## 2022-05-06 DIAGNOSIS — F32.9 REACTIVE DEPRESSION: ICD-10-CM

## 2022-05-06 DIAGNOSIS — C78.7 METASTATIC MELANOMA TO LIVER: ICD-10-CM

## 2022-05-06 DIAGNOSIS — C43.59 MELANOMA OF BACK: ICD-10-CM

## 2022-05-06 DIAGNOSIS — M50.90 CERVICAL NECK PAIN WITH EVIDENCE OF DISC DISEASE: Primary | ICD-10-CM

## 2022-05-06 DIAGNOSIS — M54.14 THORACIC RADICULOPATHY: ICD-10-CM

## 2022-05-06 PROCEDURE — 99214 OFFICE O/P EST MOD 30 MIN: CPT | Performed by: FAMILY MEDICINE

## 2022-05-06 NOTE — PROGRESS NOTES
Subjective   Vibha Darling is a 51 y.o. female.   Chief Complaint   Patient presents with   • Irritable   • Pain       History of Present Illness   You have chosen to receive care through a telehealth visit.  Do you consent to use a video/audio connection for your medical care today? Yes    Reevaluated today via video visit.  We are reevaluating her mood and chronic pain, particularly thoracic radiculopathy and cervical degenerative disc disease with severe neck pain and spasms.  Please see previous note for details, we eventually settled on Cymbalta.  She has done very well with this.  She has felt good.  Energy levels are nearly back to normal.  Her mood was good.  She was working normally.  The severe neck pains that she had been having had dramatically improved.  They were still present, but only minimally.  She was able to tolerate the discomfort.  The neuropathy pains were also nearly gone.  She is being treated for melanoma and recently underwent routine PET scan with her oncologist.  Vibha tells me today that she was told that her melanoma was back and she had multiple lesions in her liver.  MRI of the brain did not show any brain metastases.  She has already been started on a new immunotherapy for the melanoma.  She only had minor side effects and tolerated it well.  She has not, as of yet, had recurrent neuropathy pains which were felt to be due to previous chemotherapies.  She does seem to be tolerating it well.  She receives treatment once a month.  Her oncologist suggested maybe increasing her Cymbalta.      Patient Active Problem List    Diagnosis Date Noted   • B12 deficiency 11/13/2021   • Post-COVID-19 syndrome manifesting as chronic headache 10/07/2021   • Fatigue 09/24/2021   • PND (post-nasal drip) 09/24/2021   • SOB (shortness of breath) 09/24/2021   • Post-acute COVID-19 syndrome 07/29/2021   • COVID-19 virus detected 06/21/2021   • Palpitations 06/07/2021   • DDD (degenerative disc  disease), cervical 04/19/2021   • Anxiety 02/03/2021   • Multiple subsegmental pulmonary emboli without acute cor pulmonale (HCC) 02/02/2021     Note Last Updated: 2/8/2021     Bilateral - 2/2/21     • Malignant melanoma of torso excluding breast (HCC) 01/21/2021   • Thoracic radiculopathy 11/02/2020   • Inappropriate sinus tachycardia 10/26/2020   • Eosinophilic esophagitis 10/26/2020   • Lumbar degenerative disc disease 09/13/2020     Note Last Updated: 9/13/2020     MRI at [facility] - 7/27/20 - 1. No finding suspicious for metastatic disease  2. Lumbar spondylosis with degenerative disc disease and lateral recess stenosis at L5-S1.     • Melanoma of back ([facility]) 07/23/2020   • Melanoma ([facility]) 06/17/2020   • Abnormal electrocardiogram (ECG) (EKG) 04/22/2020   • Chest pain, atypical 04/21/2020   • Mixed hyperlipidemia 04/21/2020   • Elevated LFTs 04/21/2020   • Overweight (BMI 25.0-29.9) 04/21/2020   • Generalized anxiety disorder 04/21/2020   • Essential hypertension 05/01/2019           Past Surgical History:   Procedure Laterality Date   • CHOLECYSTECTOMY     • COLONOSCOPY     • DILATATION AND CURETTAGE     • DILATATION AND CURETTAGE  2010   • ECTOPIC PREGNANCY     • ECTOPIC PREGNANCY SURGERY     • SKIN CANCER EXCISION  07/01/2020     Current Outpatient Medications on File Prior to Visit   Medication Sig   • ALPRAZolam (XANAX) 0.25 MG tablet Take 0.25 mg by mouth As Needed.   • cyanocobalamin 1000 MCG/ML injection    • DULoxetine (CYMBALTA) 20 MG capsule TAKE 1 CAPSULE BY MOUTH EVERY DAY   • metoprolol tartrate (LOPRESSOR) 25 MG tablet 1/2 tablet po daily   • ondansetron (ZOFRAN) 8 MG tablet Take 8 mg by mouth.   • pantoprazole (PROTONIX) 40 mg in 100mL NS IVPB    • Prevalite 4 GM/DOSE powder Dissolve 4 g in 8 ounces of fluid and drink daily     No current facility-administered medications on file prior to visit.     Allergies   Allergen Reactions   • Doxycycline Other (See Comments) and Rash     Flushing     • Dye Fdc  "Red [Red Dye] Anaphylaxis   • Latex Itching and Rash     Skin peeling   • Levofloxacin Other (See Comments) and Rash     Flushing     • Lisinopril Cough   • Ciprofloxacin Other (See Comments)     Flushing    • Contrast Dye Itching   • Iodinated Diagnostic Agents Itching   • Antihistamines, Diphenhydramine-Type Palpitations   • Penicillins Rash     Patient states she can take penicillin-     Social History     Socioeconomic History   • Marital status:    Tobacco Use   • Smoking status: Never Smoker   • Smokeless tobacco: Never Used   Vaping Use   • Vaping Use: Never used   Substance and Sexual Activity   • Alcohol use: No   • Drug use: Never   • Sexual activity: Yes     Partners: Male     Birth control/protection: None     Family History   Problem Relation Age of Onset   • Heart disease Father    • Supraventricular tachycardia Father    • Heart disease Paternal Aunt    • Heart disease Paternal Uncle    • Thrombophilia Paternal Uncle    • Heart disease Paternal Uncle    • Alcohol abuse Paternal Uncle    • Hypertension Sister    • Hypertension Brother    • Hypertension Sister    • Hypertension Sister        Review of Systems    Objective   Pulse 87   Resp 16   Ht 170.2 cm (67.01\")   Wt 78 kg (172 lb)   LMP  (LMP Unknown)   SpO2 96%   Breastfeeding No   BMI 26.93 kg/m²   Physical Exam  Constitutional:       General: She is not in acute distress.     Appearance: She is well-developed.   HENT:      Head: Normocephalic and atraumatic.   Neck:      Comments: Motion of her neck is natural and not strained.  Pulmonary:      Effort: No accessory muscle usage or respiratory distress.   Neurological:      Mental Status: She is alert and oriented to person, place, and time.   Psychiatric:         Mood and Affect: Mood normal.         Speech: Speech normal.         Assessment/Plan   Diagnoses and all orders for this visit:    1. Cervical neck pain with evidence of disc disease (Primary)    2. Reactive " depression    3. Thoracic radiculopathy    4. Melanoma of back (HCC)    5. Metastatic melanoma to liver (HCC)    Overall, Louisa's mood and neuropathic pains responded well to Cymbalta.  I certainly think increasing the dose is appropriate.  So far she is tolerating the immunotherapy well.  I have asked her to let me know 2 weeks after her next treatment how she is doing.  If she tolerated the treatment well, then we will increase her Cymbalta to 30 mg/day at that time.  Continue current blood pressure medicines, as needed Xanax.  She has not been using that very much at all.  I will wait to hear back from her 2 weeks after her next immunotherapy.  We will arrange follow-up afterward.  Overall, I spent just over 30 minutes today discussing all the above with Vibha Wright with any problems or concerns before next visit       No follow-ups on file.      Much of this report is an electronic transcription of spoken language to printed text using Dragon dictation software.  As such, the subtleties and finesse of spoken language may permit erroneous, or at times, nonsensical words or phrases to be inadvertently transcribed; thus changes may be made at a later date to rectify these errors.     Jazmyne Sanchez MD5/8/202217:24 EDT  This note has been electronically signed

## 2022-05-13 RX ORDER — SULFAMETHOXAZOLE AND TRIMETHOPRIM 800; 160 MG/1; MG/1
1 TABLET ORAL 2 TIMES DAILY
Qty: 10 TABLET | Refills: 0 | Status: SHIPPED | OUTPATIENT
Start: 2022-05-13 | End: 2022-05-18

## 2022-07-20 ENCOUNTER — TELEMEDICINE (OUTPATIENT)
Dept: FAMILY MEDICINE CLINIC | Facility: TELEHEALTH | Age: 52
End: 2022-07-20

## 2022-07-20 DIAGNOSIS — R39.89 SUSPECTED UTI: Primary | ICD-10-CM

## 2022-07-20 DIAGNOSIS — R39.89 SUSPECTED UTI: ICD-10-CM

## 2022-07-20 PROCEDURE — 99213 OFFICE O/P EST LOW 20 MIN: CPT | Performed by: NURSE PRACTITIONER

## 2022-07-20 RX ORDER — PHENAZOPYRIDINE HYDROCHLORIDE 200 MG/1
200 TABLET, FILM COATED ORAL 3 TIMES DAILY PRN
Qty: 6 TABLET | Refills: 0 | Status: SHIPPED | OUTPATIENT
Start: 2022-07-20 | End: 2022-07-20 | Stop reason: SDUPTHER

## 2022-07-20 RX ORDER — PHENAZOPYRIDINE HYDROCHLORIDE 200 MG/1
200 TABLET, FILM COATED ORAL 3 TIMES DAILY PRN
Qty: 6 TABLET | Refills: 0 | Status: SHIPPED | OUTPATIENT
Start: 2022-07-20 | End: 2022-07-22

## 2022-07-20 RX ORDER — SULFAMETHOXAZOLE AND TRIMETHOPRIM 800; 160 MG/1; MG/1
1 TABLET ORAL 2 TIMES DAILY
Qty: 14 TABLET | Refills: 0 | Status: SHIPPED | OUTPATIENT
Start: 2022-07-20 | End: 2022-07-27

## 2022-07-20 NOTE — PROGRESS NOTES
You have chosen to receive care through a telehealth visit.  Do you consent to use a video/audio connection for your medical care today? Yes     CHIEF COMPLAINT  No chief complaint on file.        HPI  Vibha Darling is a 52 y.o. female  presents with complaint of 2 day history of dysuria, urgency and suprapubic pressure.  Today she has some pelvic pain radiating to both sides.  She has taken AZO and drank a lot of water, but still having symptoms.  She denies fever/chills, n/v, hematuria, or vaginal symptoms.  Has frequent UTIs which typically resolve with Bactrim DS    Review of Systems   Constitutional: Negative for fever.   Gastrointestinal: Negative for abdominal pain.   Genitourinary: Positive for dysuria, frequency, pelvic pain and urgency. Negative for flank pain and hematuria.   Musculoskeletal: Negative for back pain.   All other systems reviewed and are negative.      Past Medical History:   Diagnosis Date   • Anxiety    • Asthma    • Dumping syndrome    • Hyperlipidemia    • Hypertension    • Malignant melanoma (HCC) 06/2020   • Overweight (BMI 25.0-29.9) 4/21/2020       Family History   Problem Relation Age of Onset   • Heart disease Father    • Supraventricular tachycardia Father    • Heart disease Paternal Aunt    • Heart disease Paternal Uncle    • Thrombophilia Paternal Uncle    • Heart disease Paternal Uncle    • Alcohol abuse Paternal Uncle    • Hypertension Sister    • Hypertension Brother    • Hypertension Sister    • Hypertension Sister        Social History     Socioeconomic History   • Marital status:    Tobacco Use   • Smoking status: Never Smoker   • Smokeless tobacco: Never Used   Vaping Use   • Vaping Use: Never used   Substance and Sexual Activity   • Alcohol use: No   • Drug use: Never   • Sexual activity: Yes     Partners: Male     Birth control/protection: None       Vibha Darling  reports that she has never smoked. She has never used smokeless tobacco..               LMP   (LMP Unknown)     PHYSICAL EXAM  Physical Exam   Constitutional: She is oriented to person, place, and time. She appears well-developed and well-nourished. She does not have a sickly appearance. She does not appear ill.   HENT:   Head: Normocephalic and atraumatic.   Pulmonary/Chest: Effort normal.  No respiratory distress.  Neurological: She is alert and oriented to person, place, and time.         Diagnoses and all orders for this visit:    1. Suspected UTI (Primary)  -     sulfamethoxazole-trimethoprim (Bactrim DS) 800-160 MG per tablet; Take 1 tablet by mouth 2 (Two) Times a Day for 7 days.  Dispense: 14 tablet; Refill: 0  -     phenazopyridine (PYRIDIUM) 200 MG tablet; Take 1 tablet by mouth 3 (Three) Times a Day As Needed for Bladder Spasms for up to 2 days.  Dispense: 6 tablet; Refill: 0    --take medications as prescribed  --continue to increase water intake  --f/u in 2-3 days if no improvement      FOLLOW-UP  As discussed during visit with [facility]CP/[facility] if no improvement or Urgent Care/Emergency Department if worsening of symptoms    Patient verbalizes understanding of medication dosage, comfort measures, instructions for treatment and follow-up.    Iris Sheldon, BLAIR  07/20/2022  17:05 EDT    The use of a video visit has been reviewed with the patient and verbal informed consent has been obtained. Myself and Vibha Darling participated in this visit. The patient is located in [address] [place] [address] DR [address] IN [facility].    I am located in [place], [place]. Mychart and Zoom were utilized. I spent 15 minutes in the patient's chart for this visit.

## 2022-08-05 RX ORDER — CHOLESTYRAMINE 4 G/5.5G
POWDER, FOR SUSPENSION ORAL
Qty: 462 G | Refills: 2 | Status: SHIPPED | OUTPATIENT
Start: 2022-08-05 | End: 2022-11-14

## 2022-08-15 DIAGNOSIS — E53.8 B12 DEFICIENCY: Primary | ICD-10-CM

## 2022-08-15 RX ORDER — CYANOCOBALAMIN 1000 UG/ML
1000 INJECTION, SOLUTION INTRAMUSCULAR; SUBCUTANEOUS
Qty: 3 ML | Refills: 3 | Status: SHIPPED | OUTPATIENT
Start: 2022-08-15

## 2022-08-15 RX ORDER — SYRINGE W-NEEDLE,DISPOSAB,3 ML 23GX1"
SYRINGE, EMPTY DISPOSABLE MISCELLANEOUS
Qty: 3 EACH | Refills: 3 | Status: SHIPPED | OUTPATIENT
Start: 2022-08-15

## 2022-08-25 DIAGNOSIS — F32.9 REACTIVE DEPRESSION: ICD-10-CM

## 2022-08-25 DIAGNOSIS — M50.90 CERVICAL NECK PAIN WITH EVIDENCE OF DISC DISEASE: ICD-10-CM

## 2022-08-25 RX ORDER — DULOXETIN HYDROCHLORIDE 20 MG/1
20 CAPSULE, DELAYED RELEASE ORAL DAILY
Qty: 90 CAPSULE | Refills: 1 | Status: SHIPPED | OUTPATIENT
Start: 2022-08-25 | End: 2022-12-21 | Stop reason: SDUPTHER

## 2022-09-14 ENCOUNTER — TELEMEDICINE (OUTPATIENT)
Dept: FAMILY MEDICINE CLINIC | Facility: CLINIC | Age: 52
End: 2022-09-14

## 2022-09-14 VITALS
HEART RATE: 68 BPM | OXYGEN SATURATION: 98 % | RESPIRATION RATE: 16 BRPM | WEIGHT: 172 LBS | BODY MASS INDEX: 26.93 KG/M2

## 2022-09-14 DIAGNOSIS — R56.9 SEIZURE WITH PROVOKING FACTOR: Primary | ICD-10-CM

## 2022-09-14 DIAGNOSIS — R00.2 PALPITATIONS: ICD-10-CM

## 2022-09-14 DIAGNOSIS — I10 ESSENTIAL HYPERTENSION: Chronic | ICD-10-CM

## 2022-09-14 DIAGNOSIS — C78.7 METASTATIC MELANOMA TO LIVER: ICD-10-CM

## 2022-09-14 DIAGNOSIS — M50.30 DDD (DEGENERATIVE DISC DISEASE), CERVICAL: ICD-10-CM

## 2022-09-14 DIAGNOSIS — C79.31 MELANOMA METASTATIC TO BRAIN: ICD-10-CM

## 2022-09-14 PROCEDURE — 99213 OFFICE O/P EST LOW 20 MIN: CPT | Performed by: FAMILY MEDICINE

## 2022-09-14 RX ORDER — LEVETIRACETAM 500 MG/1
1000 TABLET ORAL 2 TIMES DAILY
COMMUNITY
Start: 2022-09-02 | End: 2022-12-06

## 2022-09-14 RX ORDER — DEXAMETHASONE 4 MG/1
2 TABLET ORAL DAILY
COMMUNITY
Start: 2022-08-30 | End: 2022-12-06

## 2022-10-22 DIAGNOSIS — I10 ESSENTIAL HYPERTENSION: ICD-10-CM

## 2022-11-14 RX ORDER — CHOLESTYRAMINE 4 G/5.5G
POWDER, FOR SUSPENSION ORAL
Qty: 462 G | Refills: 2 | Status: SHIPPED | OUTPATIENT
Start: 2022-11-14

## 2022-11-14 NOTE — TELEPHONE ENCOUNTER
Rx Refill Note    PROTOCOLS NOT MET     Requested Prescriptions     Pending Prescriptions Disp Refills   • Prevalite 4 GM/DOSE powder [Pharmacy Med Name: PREVALITE POWDER] 462 g 2     Sig: DISSOLVE 4 G IN 8 OUNCES OF FLUID AND DRINK DAILY      Last office visit with prescribing clinician: 11/12/2021      Next office visit with prescribing clinician: Visit date not found       Lipid Panel (04/20/2021 13:12)    COMPREHENSIVE METABOLIC PANEL (08/29/2022 03:47)         Lisa Cole LPN  11/14/22, 09:13 EST

## 2022-12-06 ENCOUNTER — TELEMEDICINE (OUTPATIENT)
Dept: FAMILY MEDICINE CLINIC | Facility: TELEHEALTH | Age: 52
End: 2022-12-06

## 2022-12-06 DIAGNOSIS — J02.9 ACUTE PHARYNGITIS, UNSPECIFIED ETIOLOGY: Primary | ICD-10-CM

## 2022-12-06 PROBLEM — D50.9 IRON DEFICIENCY ANEMIA: Status: ACTIVE | Noted: 2022-09-02

## 2022-12-06 PROBLEM — G40.109 LOCALIZATION-RELATED (FOCAL) (PARTIAL) SYMPTOMATIC EPILEPSY AND EPILEPTIC SYNDROMES WITH SIMPLE PARTIAL SEIZURES, NOT INTRACTABLE, WITHOUT STATUS EPILEPTICUS (HCC): Status: ACTIVE | Noted: 2022-08-01

## 2022-12-06 PROCEDURE — 99442 PR PHYS/QHP TELEPHONE EVALUATION 11-20 MIN: CPT | Performed by: NURSE PRACTITIONER

## 2022-12-06 RX ORDER — DIAZEPAM 5 MG/1
TABLET ORAL
COMMUNITY
Start: 2022-10-05

## 2022-12-06 RX ORDER — OMEPRAZOLE 40 MG/1
CAPSULE, DELAYED RELEASE ORAL
COMMUNITY
Start: 2022-11-15

## 2022-12-06 RX ORDER — LACOSAMIDE 100 MG/1
100 TABLET ORAL 2 TIMES DAILY
COMMUNITY
Start: 2022-11-28

## 2022-12-06 RX ORDER — AMOXICILLIN 500 MG/1
500 CAPSULE ORAL 2 TIMES DAILY
Qty: 20 CAPSULE | Refills: 0 | Status: SHIPPED | OUTPATIENT
Start: 2022-12-06 | End: 2022-12-16

## 2022-12-07 NOTE — PROGRESS NOTES
CHIEF COMPLAINT  Chief Complaint   Patient presents with   • Sore Throat         HPI  Vibha Darling is a 52 y.o. female  presents with complaint of cough and congestion for 2 weeks. These are all better. She started having a sore throat and enlarged red tonsils. Her grandson tested positive for strep today.     Review of Systems   Constitutional: Positive for chills and diaphoresis. Negative for fatigue and fever.   HENT: Positive for sore throat.    Neurological: Positive for headaches.       Past Medical History:   Diagnosis Date   • Anxiety    • Asthma    • Dumping syndrome    • Hyperlipidemia    • Hypertension    • Malignant melanoma (HCC) 06/2020   • Overweight (BMI 25.0-29.9) 4/21/2020       Family History   Problem Relation Age of Onset   • Heart disease Father    • Supraventricular tachycardia Father    • Heart disease Paternal Aunt    • Heart disease Paternal Uncle    • Thrombophilia Paternal Uncle    • Heart disease Paternal Uncle    • Alcohol abuse Paternal Uncle    • Hypertension Sister    • Hypertension Brother    • Hypertension Sister    • Hypertension Sister        Social History     Socioeconomic History   • Marital status:    Tobacco Use   • Smoking status: Never   • Smokeless tobacco: Never   Vaping Use   • Vaping Use: Never used   Substance and Sexual Activity   • Alcohol use: No   • Drug use: Never   • Sexual activity: Yes     Partners: Male     Birth control/protection: None       Vibha Darling  reports that she has never smoked. She has never used smokeless tobacco..    LMP  (LMP Unknown)     PHYSICAL EXAM  Virtual Visit Physical Exam: Deferred. Unable to connect with video.       Diagnoses and all orders for this visit:    1. Acute pharyngitis, unspecified etiology (Primary)    Other orders  -     amoxicillin (AMOXIL) 500 MG capsule; Take 1 capsule by mouth 2 (Two) Times a Day for 10 days.  Dispense: 20 capsule; Refill: 0         The use of a video visit has been reviewed with the  patient and verbal informed consent has een obtained. Myself and Vibha Darling participated in this visit. The patient is located in [address] [place] [address] [address] IN [facility]. I am located in [place], [place]. Mychart and Zoom were utilized.       Note Disclaimer: At [facility], we believe that sharing information builds trust and better   relationships. You are receiving this note because you recently visited [facility]. It is possible you   will see health information before a provider has talked with you about it. This kind of information can   be easy to misunderstand. To help you fully understand what it means for your health, we urge you to   discuss this note with your provider.    Elizabeth Pablo, BLAIR  12/06/2022  22:13 EST

## 2022-12-07 NOTE — PATIENT INSTRUCTIONS
Drink plenty of water  Over the counter pain relievers okay   If symptoms do not improve in 3-5 days follow up with your primary care provider or urgent care

## 2022-12-21 DIAGNOSIS — F32.9 REACTIVE DEPRESSION: ICD-10-CM

## 2022-12-21 DIAGNOSIS — M50.90 CERVICAL NECK PAIN WITH EVIDENCE OF DISC DISEASE: ICD-10-CM

## 2022-12-21 RX ORDER — DULOXETIN HYDROCHLORIDE 20 MG/1
20 CAPSULE, DELAYED RELEASE ORAL DAILY
Qty: 90 CAPSULE | Refills: 1 | Status: SHIPPED | OUTPATIENT
Start: 2022-12-21 | End: 2023-02-18 | Stop reason: SDUPTHER

## 2023-02-18 DIAGNOSIS — M50.90 CERVICAL NECK PAIN WITH EVIDENCE OF DISC DISEASE: ICD-10-CM

## 2023-02-18 DIAGNOSIS — F32.9 REACTIVE DEPRESSION: ICD-10-CM

## 2023-02-20 RX ORDER — DULOXETIN HYDROCHLORIDE 20 MG/1
20 CAPSULE, DELAYED RELEASE ORAL DAILY
Qty: 90 CAPSULE | Refills: 1 | Status: SHIPPED | OUTPATIENT
Start: 2023-02-20

## 2023-05-19 ENCOUNTER — PATIENT OUTREACH (OUTPATIENT)
Dept: CASE MANAGEMENT | Facility: OTHER | Age: 53
End: 2023-05-19
Payer: COMMERCIAL

## 2023-05-19 NOTE — OUTREACH NOTE
AMBULATORY CASE MANAGEMENT NOTE    Name and Relationship of Patient/Support Person: Vibha Darling S - Self    Patient Outreach    Unable to reach patient X 4.  Service declined.    Dulce MEYERS  Ambulatory Case Management    5/19/2023, 14:36 EDT

## 2023-08-24 DIAGNOSIS — E53.8 B12 DEFICIENCY: ICD-10-CM

## 2023-08-24 RX ORDER — CYANOCOBALAMIN 1000 UG/ML
1000 INJECTION, SOLUTION INTRAMUSCULAR; SUBCUTANEOUS
Qty: 3 ML | Refills: 3 | Status: SHIPPED | OUTPATIENT
Start: 2023-08-24

## 2023-11-26 DIAGNOSIS — M50.90 CERVICAL NECK PAIN WITH EVIDENCE OF DISC DISEASE: ICD-10-CM

## 2023-11-26 DIAGNOSIS — F32.9 REACTIVE DEPRESSION: ICD-10-CM

## 2023-11-27 ENCOUNTER — TELEPHONE (OUTPATIENT)
Dept: FAMILY MEDICINE CLINIC | Facility: CLINIC | Age: 53
End: 2023-11-27
Payer: COMMERCIAL

## 2023-11-27 RX ORDER — DULOXETIN HYDROCHLORIDE 20 MG/1
20 CAPSULE, DELAYED RELEASE ORAL DAILY
Qty: 90 CAPSULE | Refills: 1 | Status: SHIPPED | OUTPATIENT
Start: 2023-11-27

## 2023-11-27 NOTE — TELEPHONE ENCOUNTER
Refilled per Cardiology protocol Yes, I will absolutely be happy to do that.  Upon her release, please make sure we can get an appointment with me sometime soon so that I am fully informed of her situation.  Thanks!

## 2023-11-27 NOTE — TELEPHONE ENCOUNTER
Yuliana from [facility] is calling.  Pt is in patient at the hospital dx bacterimia. Port became infected.  Will be released in 1-2 and will need IV ABX. X 10 days.  They are requesting you follow with home health.

## 2023-11-30 ENCOUNTER — READMISSION MANAGEMENT (OUTPATIENT)
Dept: CALL CENTER | Facility: HOSPITAL | Age: 53
End: 2023-11-30
Payer: COMMERCIAL

## 2023-11-30 NOTE — OUTREACH NOTE
Prep Survey      Flowsheet Row Responses   [facility] facility patient discharged from? Non-BH   Is LACE score < 7 ? Non-BH Discharge   Eligibility [facility]   Date of Discharge 11/29/23   Discharge Disposition Home or Self Care   Discharge diagnosis Malignant melanoma , s/p port removal   Does the patient have one of the following disease processes/diagnoses(primary or secondary)? General Surgery   Prep survey completed? Yes            Ghada SIERRA - Registered Nurse

## 2023-12-01 ENCOUNTER — TRANSITIONAL CARE MANAGEMENT TELEPHONE ENCOUNTER (OUTPATIENT)
Dept: CALL CENTER | Facility: HOSPITAL | Age: 53
End: 2023-12-01
Payer: COMMERCIAL

## 2023-12-01 ENCOUNTER — TELEPHONE (OUTPATIENT)
Dept: FAMILY MEDICINE CLINIC | Facility: CLINIC | Age: 53
End: 2023-12-01
Payer: COMMERCIAL

## 2023-12-01 NOTE — TELEPHONE ENCOUNTER
Caller: HORACE-NURSE FROM [facility]    Relationship: Home Health    Best call back number: [phone]     What is the best time to reach you: ANY    Who are you requesting to speak with (clinical staff, provider,  specific staff member): PCP    Do you know the name of the person who called:     What was the call regarding: HOME HEALTH CARE FOR IV MANAGEMENT    Is it okay if the provider responds through MyChart:

## 2023-12-01 NOTE — OUTREACH NOTE
Call Center TCM Note      Flowsheet Row Responses   [facility] patient discharged from? Non-  [[facility]]   Does the patient have one of the following disease processes/diagnoses(primary or secondary)? Other   TCM attempt successful? Yes   Call start time 1517   Call end time 1517   Discharge diagnosis Malignant melanoma , s/p port removal   Meds reviewed with patient/caregiver? Yes   Is the patient having any side effects they believe may be caused by any medication additions or changes? No   Does the patient have all medications ordered at discharge? Yes   Is the patient taking all medications as directed (includes completed medication regime)? Yes   Medication comments Is getting IV abt   Comments HOSP DC FU appt 12/6/23 345 pm.   Does the patient have an appointment with their PCP within 7-14 days of discharge? Yes   Has home health visited the patient within 72 hours of discharge? N/A   Psychosocial issues? No   Did the patient receive a copy of their discharge instructions? Yes   Nursing interventions Reviewed instructions with patient   What is the patient's perception of their health status since discharge? Improving   Is the patient/caregiver able to teach back the hierarchy of who to call/visit for symptoms/problems? PCP, Specialist, Home health nurse, Urgent Care, ED, 911 Yes   TCM call completed? Yes   Wrap up additional comments Pt reports she is doing ok. No needs.   Call end time 1517            Marlene Lechuga RN    12/1/2023, 15:18 EST

## 2023-12-06 ENCOUNTER — TELEMEDICINE (OUTPATIENT)
Dept: FAMILY MEDICINE CLINIC | Facility: CLINIC | Age: 53
End: 2023-12-06
Payer: COMMERCIAL

## 2023-12-06 DIAGNOSIS — K65.1 INTRA-ABDOMINAL ABSCESS: ICD-10-CM

## 2023-12-06 DIAGNOSIS — T80.219A INFECTION OF VENOUS ACCESS PORT, INITIAL ENCOUNTER: ICD-10-CM

## 2023-12-06 DIAGNOSIS — F06.4 ANXIETY DISORDER DUE TO GENERAL MEDICAL CONDITION: ICD-10-CM

## 2023-12-06 DIAGNOSIS — C43.59 MELANOMA OF BACK: ICD-10-CM

## 2023-12-06 DIAGNOSIS — A41.01 SEPSIS DUE TO METHICILLIN SUSCEPTIBLE STAPHYLOCOCCUS AUREUS (MSSA) WITHOUT ACUTE ORGAN DYSFUNCTION: ICD-10-CM

## 2023-12-06 DIAGNOSIS — C79.31 MALIGNANT NEOPLASM METASTATIC TO BRAIN: ICD-10-CM

## 2023-12-06 DIAGNOSIS — Z09 HOSPITAL DISCHARGE FOLLOW-UP: Primary | ICD-10-CM

## 2023-12-06 RX ORDER — TRAMADOL HYDROCHLORIDE 50 MG/1
TABLET ORAL
COMMUNITY
Start: 2023-09-21

## 2023-12-06 RX ORDER — CYCLOBENZAPRINE HCL 5 MG
TABLET ORAL
COMMUNITY
Start: 2023-09-05

## 2023-12-06 RX ORDER — POTASSIUM CHLORIDE 1.5 G/1.58G
20 POWDER, FOR SOLUTION ORAL
COMMUNITY
Start: 2023-09-28

## 2023-12-06 RX ORDER — PANTOPRAZOLE SODIUM 40 MG/1
40 TABLET, DELAYED RELEASE ORAL
COMMUNITY
Start: 2023-05-18 | End: 2024-05-17

## 2023-12-06 RX ORDER — PREDNISONE 10 MG/1
20 TABLET ORAL 3 TIMES DAILY
COMMUNITY
Start: 2023-11-09

## 2023-12-06 NOTE — Clinical Note
I had a video visit with Louisa on 12/6-would you please call her to arrange a follow-up in approximately 1 month?  Thanks!

## 2023-12-06 NOTE — PROGRESS NOTES
Subjective   Vibha Darling is a 53 y.o. female.   Chief Complaint   Patient presents with    Hospital Follow Up Visit              History of Present Illness   You have chosen to receive care through a telehealth visit.  Do you consent to use a video/audio connection for your medical care today? Yes    Vibha is seen today in hospital follow-up via telemedicine as she is dealing with metastatic melanoma and her  just had surgery and cannot provide transportation.  She was recently hospitalized at [facility] for 8 days from November 22 through November 30.  As of the time of this visit she has been home now for 6 days.  She had methicillin sensitive Staph aureus bacteremia felt to be due to an infected chemotherapy port.  This was removed on November 23, 2023.  She also had a pelvic abscess.  This is suspected to be due to underlying tumor mass with probable bowel perforation.  She had what was suspected to be metastatic locus very similar to the area of fluid collection.  Since she had leukopenia, recent colonoscopy saw a mass in that area, they decided to treat her with IV Rocephin for total of 14 more days.  She has 9 days of IV Rocephin left.  She is receiving 2 g every 24 hours.  She recalls having a lot of abdominal pain but that it got better almost within hours of her first antibiotic dose.    She is being followed by home health who provides the IV Rocephin.  She tells me there are no other new medications.  With regard to this recent hospitalization, she is doing well.  She continues on daily prednisone 10 mg twice daily, she is on chemotherapy consisting of carboplatin/Taxol per Dr. Cantrell.    She has had seizures since she was diagnosed with melanoma mets to the brain.  She is on antiseizure medication and has had no seizures recently.      She updates me that she went to [facility] in [place] regarding her metastatic melanoma.  She was evaluated there twice.  She was given some  immuno therapy.  She got very sick, lost 50 pounds.  She tells me they wanted to try doing some intracerebral chemotherapy directly into the brain.  She declined that and has followed with her local oncologist ever since.            Patient Active Problem List    Diagnosis Date Noted    Iron deficiency anemia 09/02/2022    Seizure 08/28/2022    Brain metastasis 08/22/2022     Note Last Updated: 4/3/2023     4/1/2023 DX Regulatory Update      Localization-related (focal) (partial) symptomatic epilepsy and epileptic syndromes with simple partial seizures, not intractable, without status epilepticus 08/01/2022    B12 deficiency 11/13/2021    Post-COVID-19 syndrome manifesting as chronic headache 10/07/2021    Fatigue 09/24/2021    PND (post-nasal drip) 09/24/2021    SOB (shortness of breath) 09/24/2021    Post-acute COVID-19 syndrome 07/29/2021    COVID-19 virus detected 06/21/2021    Palpitations 06/07/2021    DDD (degenerative disc disease), cervical 04/19/2021    Anxiety disorder due to general medical condition 02/03/2021    Multiple subsegmental pulmonary emboli without acute cor pulmonale 02/02/2021     Note Last Updated: 2/8/2021     Bilateral - 2/2/21      Malignant melanoma of torso excluding breast 01/21/2021    Thoracic radiculopathy 11/02/2020    Inappropriate sinus tachycardia 10/26/2020    Eosinophilic esophagitis 10/26/2020    Lumbar degenerative disc disease 09/13/2020     Note Last Updated: 9/13/2020     MRI at [facility] - 7/27/20 - 1. No finding suspicious for metastatic disease  2. Lumbar spondylosis with degenerative disc disease and lateral recess stenosis at L5-S1.      Melanoma of back 07/23/2020    Melanoma 06/17/2020    Abnormal electrocardiogram (ECG) (EKG) 04/22/2020    Chest pain, atypical 04/21/2020    Mixed hyperlipidemia 04/21/2020    Elevated LFTs 04/21/2020    Overweight (BMI 25.0-29.9) 04/21/2020    Generalized anxiety disorder 04/21/2020    Essential hypertension 05/01/2019           Past  "Surgical History:   Procedure Laterality Date    CHOLECYSTECTOMY      COLONOSCOPY      DILATATION AND CURETTAGE      DILATATION AND CURETTAGE  2010    ECTOPIC PREGNANCY      ECTOPIC PREGNANCY SURGERY      SKIN CANCER EXCISION  07/01/2020     Current Outpatient Medications on File Prior to Visit   Medication Sig    cefTRIAXone 2,000 mg in dextrose 5 % 50 mL IVPB Infuse 2,000 mg into a venous catheter.    cyanocobalamin 1000 MCG/ML injection INJECT 1 ML INTO THE APPROPRIATE MUSCLE AS DIRECTED BY PRESCRIBER EVERY 30 (THIRTY) DAYS.    cyclobenzaprine (FLEXERIL) 5 MG tablet PLEASE SEE ATTACHED FOR DETAILED DIRECTIONS    diazePAM (VALIUM) 5 MG tablet TAKE 1/2 - 1 TAB BY MOUTH EVERY 8 HOURS AS NEEDED FOR ANXIETY    DULoxetine (CYMBALTA) 20 MG capsule TAKE 1 CAPSULE BY MOUTH EVERY DAY    lacosamide (VIMPAT) 100 MG tablet tablet Take 1 tablet by mouth 1 (One) Time.    metoprolol tartrate (LOPRESSOR) 25 MG tablet TAKE 1/2 TABLET BY MOUTH DAILY    omeprazole (priLOSEC) 40 MG capsule TAKE 1 CAPSULE BY MOUTH EVERY DAY *DO NOT CRUSH OR CHEW*    ondansetron (ZOFRAN) 8 MG tablet Take 1 tablet by mouth.    pantoprazole (PROTONIX) 40 MG EC tablet Take 1 tablet by mouth.    potassium chloride (KLOR-CON) 20 MEQ packet Take 20 mEq by mouth.    predniSONE (DELTASONE) 10 MG tablet Take 2 tablets by mouth 3 (Three) Times a Day.    Syringe/Needle, Disp, (Easy Touch Safety Syringe) 23G X 1\" 3 ML misc Use to inject B12 monthly    traMADol (ULTRAM) 50 MG tablet TAKE 1 TABLET EVERY 6 HOURS AS NEEDED FOR PAIN FOR UP TO 10 DAYS    dabrafenib (Tafinlar) 75 MG chemo capsule Take 2 capsules (150 mg total) by mouth every 12 (twelve) hours. Take at least 1 hour before or 2 hours after a meal. Do not open, crush, or break. (Patient not taking: Reported on 12/6/2023)    Prevalite 4 GM/DOSE powder DISSOLVE 4 G IN 8 OUNCES OF FLUID AND DRINK DAILY (Patient not taking: Reported on 12/6/2023)    trametinib dimethyl sulfoxide (Mekinist) 2 MG chemo tablet " Take 1 tablet (2 mg total) by mouth 1 (one) time each day. Take at least 1 hour before or 2 hours after a meal. (Patient not taking: Reported on 12/6/2023)     No current facility-administered medications on file prior to visit.     Allergies   Allergen Reactions    Doxycycline Other (See Comments) and Rash     Flushing      Latex Itching and Rash     Skin peeling    Levofloxacin Other (See Comments) and Rash     Flushing      Lisinopril Cough    Ciprofloxacin Other (See Comments)     Flushing     Contrast Dye (Echo Or Unknown Ct/Mr) Itching    Iodinated Contrast Media Itching    Antihistamines, Diphenhydramine-Type Palpitations     Social History     Socioeconomic History    Marital status:    Tobacco Use    Smoking status: Never    Smokeless tobacco: Never   Vaping Use    Vaping Use: Never used   Substance and Sexual Activity    Alcohol use: No    Drug use: Never    Sexual activity: Yes     Partners: Male     Birth control/protection: None     Family History   Problem Relation Age of Onset    Heart disease Father     Supraventricular tachycardia Father     Heart disease Paternal Aunt     Heart disease Paternal Uncle     Thrombophilia Paternal Uncle     Heart disease Paternal Uncle     Alcohol abuse Paternal Uncle     Hypertension Sister     Hypertension Brother     Hypertension Sister     Hypertension Sister        Review of Systems    Objective   LMP  (LMP Unknown)   Physical Exam  Constitutional:       General: She is not in acute distress.     Appearance: She is well-developed.   HENT:      Head: Normocephalic and atraumatic.   Pulmonary:      Effort: No accessory muscle usage or respiratory distress.   Neurological:      Mental Status: She is alert and oriented to person, place, and time.   Psychiatric:         Speech: Speech normal.               BMI is >= 25 and <30. (Overweight) The following options were offered after discussion;: none (medical contraindication)     Assessment & Plan   Diagnoses and  all orders for this visit:    1. Hospital discharge follow-up (Primary)    2. Melanoma of back    3. Brain metastasis    4. Sepsis due to methicillin susceptible Staphylococcus aureus (MSSA) without acute organ dysfunction    5. Infection of venous access port, initial encounter    6. Intra-abdominal abscess    7. Anxiety disorder due to general medical condition        This transition of care visit is conducted within 6 days of her discharge from an acute care hospital for MSSA bacteremia, intracerebral abscess, and infected chemotherapy port.  Medical decision making is only moderately complex.  She needs to complete the IV home antibiotics as instructed for an additional 9 days via home health.  She will follow-up with her oncologist for further management of her metastatic melanoma.  They will determine whether further treatment needs to be rendered regarding the abscess in her abdomen.  From my standpoint, she should finish the IV antibiotics, continue medications that she was given on discharge.  She has not had any further seizures, so she will continue Vimpat 100 mg twice daily.  She will also continue vitamin B12, Cymbalta 30 mg/day which has been very helpful with her mood and anxiety as she has dealt with these significant medical problems.  Continue prednisone 20 mg twice daily.  She was also on diazepam 1/2 to 1 tablet 5 mg tablet every 8 hours as needed, Zofran 8 mg as needed.  I do not have any changes to recommend.  Her mood is okay on Cymbalta.  Certainly recommend continuing that.      Call with any problems or concerns before next visit       Return in about 4 weeks (around 1/3/2024).      Much of this report is an electronic transcription of spoken language to printed text using Dragon dictation software.  As such, the subtleties and finesse of spoken language may permit erroneous, or at times, nonsensical words or phrases to be inadvertently transcribed; thus changes may be made at a later date  to rectify these errors.     Jazmyne Sanchez MD12/10/445540:42 EST  This note has been electronically signed

## 2023-12-10 PROBLEM — F06.4 ANXIETY DISORDER DUE TO GENERAL MEDICAL CONDITION: Status: ACTIVE | Noted: 2021-02-03

## 2023-12-11 ENCOUNTER — TELEPHONE (OUTPATIENT)
Dept: FAMILY MEDICINE CLINIC | Facility: CLINIC | Age: 53
End: 2023-12-11
Payer: COMMERCIAL

## 2023-12-18 ENCOUNTER — OUTSIDE FACILITY SERVICE (OUTPATIENT)
Dept: FAMILY MEDICINE CLINIC | Facility: CLINIC | Age: 53
End: 2023-12-18
Payer: COMMERCIAL

## 2024-12-30 NOTE — PLAN OF CARE
Problem: Cardiac: ACS (Acute Coronary Syndrome) (Adult)  Goal: Signs and Symptoms of Listed Potential Problems Will be Absent, Minimized or Managed (Cardiac: ACS)  Description  Signs and symptoms of listed potential problems will be absent, minimized or managed by discharge/transition of care (reference Cardiac: ACS (Acute Coronary Syndrome) (Adult) CPG).  Outcome: Outcome(s) achieved     Problem: Patient Care Overview  Goal: Plan of Care Review  Outcome: Outcome(s) achieved  Goal: Individualization and Mutuality  Outcome: Outcome(s) achieved  Goal: Discharge Needs Assessment  Outcome: Outcome(s) achieved  Goal: Interprofessional Rounds/Family Conf  Outcome: Outcome(s) achieved      No lymphadedenopathy